# Patient Record
Sex: FEMALE | Race: BLACK OR AFRICAN AMERICAN | NOT HISPANIC OR LATINO | Employment: FULL TIME | URBAN - METROPOLITAN AREA
[De-identification: names, ages, dates, MRNs, and addresses within clinical notes are randomized per-mention and may not be internally consistent; named-entity substitution may affect disease eponyms.]

---

## 2017-01-04 ENCOUNTER — PRENATAL OFFICE VISIT (OUTPATIENT)
Dept: OBGYN | Facility: CLINIC | Age: 27
End: 2017-01-04
Payer: COMMERCIAL

## 2017-01-04 ENCOUNTER — TRANSFERRED RECORDS (OUTPATIENT)
Dept: HEALTH INFORMATION MANAGEMENT | Facility: CLINIC | Age: 27
End: 2017-01-04

## 2017-01-04 VITALS
HEART RATE: 81 BPM | SYSTOLIC BLOOD PRESSURE: 114 MMHG | DIASTOLIC BLOOD PRESSURE: 76 MMHG | BODY MASS INDEX: 28.42 KG/M2 | WEIGHT: 172 LBS | TEMPERATURE: 97.1 F | RESPIRATION RATE: 16 BRPM

## 2017-01-04 DIAGNOSIS — O34.219 PREVIOUS CESAREAN DELIVERY, ANTEPARTUM CONDITION OR COMPLICATION: ICD-10-CM

## 2017-01-04 DIAGNOSIS — O36.0990: ICD-10-CM

## 2017-01-04 DIAGNOSIS — Z34.80 SUPERVISION OF OTHER NORMAL PREGNANCY, ANTEPARTUM: ICD-10-CM

## 2017-01-04 DIAGNOSIS — O21.9 NAUSEA/VOMITING IN PREGNANCY: ICD-10-CM

## 2017-01-04 DIAGNOSIS — O99.019 ANEMIA AFFECTING PREGNANCY: Primary | ICD-10-CM

## 2017-01-04 PROCEDURE — 99207 ZZC PRENATAL VISIT: CPT | Performed by: OBSTETRICS & GYNECOLOGY

## 2017-01-04 NOTE — Clinical Note
Please assist pt in scheduling IV iron treatment if able, notify MFM to send reports to me (not Dr. Joshi), notify  Nurse Navigator (Penny) at [facility] of pt and plan and contact info. Schedule repeat C/S- see other encounter.

## 2017-01-04 NOTE — PATIENT INSTRUCTIONS
If you have any questions regarding your visit, Please contact your care team.     [employer] Access Services: [phone]    [facility] CLINIC HOURS TELEPHONE NUMBER   CHANTELL Jay-    Modesta Mohr-JOSSIE Carl-Medical Assistant   Monday-[place]  8:00a.m-4:45 p.m  Wednesday-[place] 8:00a.m-4:45 p.m.  Thursday-[place]  8:00a.m-4:45 p.m.  Friday-[place]  8:00a.m-4:45 p.m. [facility]  [address]  [place], [place] [address] [phone] ask [facility]  [phone] Fax  Imaging [phone]    [facility] Labor [facility] Delivery  [address] [place] [address] Dr.  [place], [place] [address] [phone]    [facility]  [address] [place] [address]  [phone] ask [facility]  [phone] Fax  Imaging [phone]     Urgent Care locations:    [place]        [place] Monday-Friday  5 pm - 9 pm  Saturday and Sunday   9 am - 5 pm    Monday-Friday   11 am - 9 pm  Saturday and Sunday   9 am - 5 pm   [phone]       If you need a medication refill, please contact your pharmacy. Please allow 3 business days for your refill to be completed.  As always, Thank you for trusting us with your healthcare needs!

## 2017-01-04 NOTE — NURSING NOTE
"Chief Complaint   Patient presents with     Prenatal Care     OBV 32w3d       Initial /76 mmHg  Pulse 81  Temp(Src) 97.1  F (36.2  C) (Oral)  Resp 16  Wt 172 lb (78.019 kg)  LMP 05/22/2016 (Exact Date)  Breastfeeding? No Estimated body mass index is 28.42 kg/(m^2) as calculated from the following:    Height as of 6/20/16: 5' 5.25\" (1.657 m).    Weight as of this encounter: 172 lb (78.019 kg).  BP completed using cuff size: regular Left arm  Faizai RICKY Freire      "

## 2017-01-04 NOTE — MR AVS SNAPSHOT
After Visit Summary   2017    Brittani Gaviria    MRN: [id number]           Patient Information     Date Of Birth          1990        Visit Information        Provider Department      2017 2:00 PM Joni Siddiqui MD [facility]        Today's Diagnoses     Anemia affecting pregnancy    -  1     Rh sensitization, unspecified trimester, not applicable or unspecified fetus         Supervision of other normal pregnancy, antepartum         Nausea/vomiting in pregnancy         Previous  delivery, antepartum condition or complication           Care Instructions                                                        If you have any questions regarding your visit, Please contact your care team.     [employer] Access Services: [phone]    [facility] CLINIC HOURS TELEPHONE NUMBER   Joni Siddiqui M.D.      Deyanira-    Modesta Mohr-JOSSIE Carl-Medical Assistant   Monday-[place]  8:00a.m-4:45 p.m  Wednesday-[place] 8:00a.m-4:45 p.m.  Thursday-[place]  8:00a.m-4:45 p.m.  Friday-[place]  8:00a.m-4:45 p.m. [facility]  [address] [place]  [place] [address]  [phone] ask [facility]  [phone] Fax  Imaging [phone]    [facility] Delivery  [address] [place] [address] [place] [address]  [phone]    [facility]  [address] [facility] [place] [address]  [phone] ask [facility]  [phone] Fax  Imaging [phone]     Urgent Care locations:    [facility] Monday-Friday  5 pm - 9 pm  Saturday and    9 am - 5 pm    Monday-Friday   11 am - 9 pm  Saturday and    9 am - 5 pm   [phone]       If you need a medication refill, please contact your pharmacy. Please allow 3 business days for your refill to be completed.  As always, Thank you for trusting us with your healthcare  needs!          Follow-ups after your visit        Who to contact     If you have questions or need follow up information about today's clinic visit or your schedule please contact [facility] directly at [phone].  Normal or non-critical lab and imaging results will be communicated to you by MyChart, letter or phone within 4 business days after the clinic has received the results. If you do not hear from us within 7 days, please contact the clinic through [employer] or phone. If you have a critical or abnormal lab result, we will notify you by phone as soon as possible.  Submit refill requests through ralali or call your pharmacy and they will forward the refill request to us. Please allow 3 business days for your refill to be completed.          Additional Information About Your Visit        MyChart Information     ralali gives you secure access to your electronic health record. If you see a primary care provider, you can also send messages to your care team and make appointments. If you have questions, please call your primary care clinic.  If you do not have a primary care provider, please call [phone] and they will assist you.        Care EveryWhere ID     This is your Care EveryWhere ID. This could be used by other organizations to access your [facility] medical records  [id number]        Your Vitals Were     Pulse Temperature Respirations Last Period Breastfeeding?       81 97.1  F (36.2  C) (Oral) 16 05/22/2016 (Exact Date) No        Blood Pressure from Last 3 Encounters:   01/04/17 114/76   11/30/16 114/75   09/14/16 114/73    Weight from Last 3 Encounters:   01/04/17 172 lb (78.019 kg)   11/30/16 180 lb (81.647 kg)   09/14/16 172 lb (78.019 kg)              Today, you had the following     No orders found for display         Today's Medication Changes          These changes are accurate as of: 1/4/17  2:04 PM.  If you have any questions, ask your nurse or doctor.                These medicines have changed or have updated prescriptions.        Dose/Directions    folic acid 400 MCG tablet   Commonly known as:  FOLVITE   This may have changed:  how much to take   Used for:  Amenorrhea        Dose:  800 mcg   Take 2 tablets (800 mcg) by mouth daily   Quantity:  180 tablet   Refills:  3                Primary Care Provider Office Phone # Fax #    Lida Tamayo PA-C [phone]       [facility] [address]  [facility] [address]        Thank you!     Thank you for choosing [facility]  for your care. Our goal is always to provide you with excellent care. Hearing back from our patients is one way we can continue to improve our services. Please take a few minutes to complete the written survey that you may receive in the mail after your visit with us. Thank you!             Your Updated Medication List - Protect others around you: Learn how to safely use, store and throw away your medicines at www.disposemymeds.org.          This list is accurate as of: 1/4/17  2:04 PM.  Always use your most recent med list.                   Brand Name Dispense Instructions for use    folic acid 400 MCG tablet    FOLVITE    180 tablet    Take 2 tablets (800 mcg) by mouth daily       ondansetron 4 MG ODT tab    ZOFRAN ODT    20 tablet    Take 1-2 tablets (4-8 mg) by mouth every 8 hours as needed for nausea       PRENATAL 1 PO      Take by mouth daily       vitamin D 2000 UNITS tablet     100 tablet    Take 2,000 Units by mouth daily

## 2017-01-05 ENCOUNTER — DOCUMENTATION ONLY (OUTPATIENT)
Dept: OBGYN | Facility: CLINIC | Age: 27
End: 2017-01-05

## 2017-01-05 DIAGNOSIS — O34.219 PREVIOUS CESAREAN DELIVERY, ANTEPARTUM CONDITION OR COMPLICATION: Primary | ICD-10-CM

## 2017-01-05 DIAGNOSIS — O36.0990: ICD-10-CM

## 2017-01-06 ENCOUNTER — TELEPHONE (OUTPATIENT)
Dept: OBGYN | Facility: CLINIC | Age: 27
End: 2017-01-06

## 2017-01-06 DIAGNOSIS — O34.219 PREVIOUS CESAREAN DELIVERY, ANTEPARTUM CONDITION OR COMPLICATION: ICD-10-CM

## 2017-01-06 DIAGNOSIS — O36.0990: ICD-10-CM

## 2017-01-06 DIAGNOSIS — O99.019 ANEMIA AFFECTING PREGNANCY: Primary | ICD-10-CM

## 2017-01-06 DIAGNOSIS — O21.9 NAUSEA/VOMITING IN PREGNANCY: ICD-10-CM

## 2017-01-06 DIAGNOSIS — Z34.80 SUPERVISION OF OTHER NORMAL PREGNANCY, ANTEPARTUM: ICD-10-CM

## 2017-01-06 NOTE — PROGRESS NOTES
Brittani Gaviria  1990  Gender: Female  SSN: Not available  Phone: [phone] (home)     Pre-operative diagnosis: Previous  deliveries, Rh sensitization, corrected Tetralogy of Fallot  Surgical procedure: Repeat  delivery  Special equipment: None    Anesthesia: Spinal  Position: Supine  Latex Allergy: No  VRE or MRSA or Other Precautions: None  Initiate Pre-op orders for above procedure: Yes as ordered in Epic.   Additional orders noted there also.   [facility] or [facility] Consent signed by surgeon: Not yet.  H&P: To be scheduled and will be faxed before surgery. Surgeon will do H&P.  [facility] or [facility] Consent signed by patient: Not yet.  Sterilization or Hysterectomy consent signed in advance: Not applicable.   Date sterilization consent signed for Med Assistance pt: Not applicable.  Surgeon: Joni Siddiqui MD   PA assistant: No  Physician assistant: No  Hospital assistant requested: Yes    Electronically signed by: Joni Siddiqui MD       2017          10:10 PM     Location for Surgery: [facility]  Date:  or .   Estimated Case Length: 1 hours.  Scheduled as: Admit following surgery  Other Special Preparations: None.  Pre-anesthetic medical evaluation is to be arranged with me at future OB visit.  Anticipate a post-op visit at 6 weeks will be needed. Pt may arrange this now or later.

## 2017-01-06 NOTE — PROGRESS NOTES
Surgery Scheduled.    Date of Surgery 17 Time of Surgery 2:30 pm  Procedure: Repeat   Hospital/Surgical Facility: [facility]  Surgeon: [facility]  Type of Anesthesia Anticipated: Spinal  Pre-op: To be done by Dr. Siddiqui at [facility]  6 week post op 3/28/17 with Dr. Siddiqui at  OB  Pre-certification 17  Consent signed: NA  Hospital Stay 3 days       [facility] surgery packet mailed to patient's home address.  Patient instructed NPO 12 hours prior to surgery, arrive 1 1/2 hours prior to surgery, must not have a .  Patient understood and agrees to the plan.      Surgery Pre-Certification     Medical Record Number: [id number]   Brittani Gaviria   YOB: 1990   Phone: [phone] (home)   Primary Provider: Lida Tamayo     Reason for Admit:   Previous / RH Sensitization/ Corrected Tetrology of Fallot     Surgeon: Joni Siddiqui MD   Surgical Procedure: Repeat    ICD-9 Coded: O34.21/ O36.0990/ Q21.3   Date of Surgery: 17   Consent signed? N/A     Hospital: [facility]   Inpatient- Length of stay:  3 days.     Requestor:   Jyoti Madera     Location:   [facility]   Deyanira Rangel CMA

## 2017-01-06 NOTE — PROGRESS NOTES
Doing ok. Gap in care. No signif signs, symptoms or concerns except pregnancy discomforts, recurrent nausea and wt loss. Level 2 u/s and fetal echo normal. Following with MFM for serial u/s for MCA dopplers, BPP and fetal growth- all stable at present but it appears that MFM reports have been sent to ?Dr. Joshi and not me. Repeat C/S at 38 weeks at [facility] advised per MFM unless new developments. Will arrange. Cardiology had previously noted normal LV function with EF of 55 pct 5 mo ago. Pt missed follow-up appt this last month. Has not seen Endocrinology for hyperparathyroidism but hypercalcemia is chronic and stable and asymptomatic for years. Here with children. Refer to [facility] PNN too. Advice as per Anticipatory Guidance/Checklist update. Discussed condition, tests and care plan including RBA. Problem list updated. Tdap planned and Hgb next.  Joni Siddiqui MD

## 2017-01-06 NOTE — TELEPHONE ENCOUNTER
Return call from JOSSIE Francis. Gave update per Dr. Siddiqui as below. Gave erin Francis's future appts including infusion appts, cardiology and next OB appt. Faxed prenatal episode and US results to Penny at [phone] and confirmed at [phone]. Fani Leiva RN, BAN

## 2017-01-09 ENCOUNTER — CARE COORDINATION (OUTPATIENT)
Dept: CARDIOLOGY | Facility: CLINIC | Age: 27
End: 2017-01-09

## 2017-01-09 DIAGNOSIS — Q21.3 TOF (TETRALOGY OF FALLOT): Primary | ICD-10-CM

## 2017-01-09 DIAGNOSIS — Z34.90 PREGNANCY: ICD-10-CM

## 2017-01-09 PROBLEM — Q24.9 CONGENITAL ANOMALY OF HEART: Status: ACTIVE | Noted: 2017-01-09

## 2017-01-17 ENCOUNTER — MYC MEDICAL ADVICE (OUTPATIENT)
Dept: OBGYN | Facility: CLINIC | Age: 27
End: 2017-01-17

## 2017-01-19 ENCOUNTER — TELEPHONE (OUTPATIENT)
Dept: NURSING | Facility: CLINIC | Age: 27
End: 2017-01-19

## 2017-01-19 NOTE — TELEPHONE ENCOUNTER
Left message asking patient to call back regarding appt. She needs to see Dr Alcaraz as she is a congenital patient and >30 weeks pregnant. She should not see Dr Lo next week. Asked her to call back.

## 2017-01-20 ENCOUNTER — INFUSION THERAPY VISIT (OUTPATIENT)
Dept: INFUSION THERAPY | Facility: CLINIC | Age: 27
End: 2017-01-20
Payer: COMMERCIAL

## 2017-01-20 VITALS
OXYGEN SATURATION: 100 % | TEMPERATURE: 96.8 F | HEART RATE: 86 BPM | SYSTOLIC BLOOD PRESSURE: 119 MMHG | RESPIRATION RATE: 16 BRPM | DIASTOLIC BLOOD PRESSURE: 67 MMHG

## 2017-01-20 DIAGNOSIS — O99.019 ANEMIA AFFECTING PREGNANCY: Primary | ICD-10-CM

## 2017-01-20 PROCEDURE — 99207 ZZC NO CHARGE LOS: CPT

## 2017-01-20 PROCEDURE — 96365 THER/PROPH/DIAG IV INF INIT: CPT | Performed by: INTERNAL MEDICINE

## 2017-01-20 RX ADMIN — Medication 250 ML: at 14:15

## 2017-01-20 ASSESSMENT — PAIN SCALES - GENERAL: PAINLEVEL: NO PAIN (0)

## 2017-01-20 NOTE — PROGRESS NOTES
Infusion Nursing Note:  Brittani Gaviria presents today for Venofer.    Patient seen by provider today: No   present during visit today: Not Applicable.    Note: The pharmacist provided patient with a handout on Venofer and verbally reviewed side effects and reaction symptoms.    Intravenous Access: PIV placed    Treatment Conditions:  Not Applicable.      Post Infusion Assessment:  Patient tolerated infusion without incident.  Blood return noted pre and post infusion.  Site patent and intact, free from redness, edema or discomfort.  Access discontinued per protocol.    Discharge Plan:   Patient will return 1/24/17 for next appointment.   Patient discharged in stable condition accompanied by: self.  Departure Mode: Ambulatory.    Ioana Kaiser RN

## 2017-01-20 NOTE — TELEPHONE ENCOUNTER
Spoke with patient. She is aware of appt with Dr Alcaraz with echocardiogram prior on Feb 10. States she spoke to Moises and is aware of the address of the clinic down at the [place]. Informed her that I am cancelling appt with Dr Lo on Jan 24 th.

## 2017-01-23 ENCOUNTER — TELEPHONE (OUTPATIENT)
Dept: OBGYN | Facility: CLINIC | Age: 27
End: 2017-01-23

## 2017-01-23 NOTE — TELEPHONE ENCOUNTER
----- Message from Joni Siddiqui MD sent at 1/23/2017 12:13 PM CST -----  Anesthesia at [facility] has advised that pt not deliver at [facility]. Pt is aware of this following her consultation at [facility]. She is transferring her OB care to [place] and will deliver there. Please contact pt to let her know that we will cancel the [facility] C/S with me and the related OB prenatal and postpartum visits. She should still keep her other follow-up visits with M and the remaining IV iron infusion visit here. I can also see her for the 1/25 (and 2/3, 2/8, or 2/15) OB visit if she desires and does not have an OB visit with Dr. Travis Kaba at [place] that week. She should also arrange for transfer of OB records as appropriate. Thank you.

## 2017-01-23 NOTE — TELEPHONE ENCOUNTER
Spoke with patient- gave patient all information below.  Patient understood.  Cancelled  on 17 with Ioana ([facility] Surgical Scheduler).  Pre-certification department notified.  Cancelled patient's OBV and PPE with Dr. Siddiqui.  Patient has upcoming appointment scheduled at [facility] and will continue to see them for the remaining visits.  Patient will keep MFM appointment's and IV iron appointment's at .  Patient is coming into clinic tomorrow for IV iron and will stop at check in area 1 to sign KATHERYN form to have records sent to [facility]. Once form is received- will send records.  Patient understood and pleased.      FYI     Cancelled patient's  on 17 at [facility] with Dr. Siddiqui. Patient needs to transfer care- so not rescheduling.     Thanks.

## 2017-01-24 ENCOUNTER — INFUSION THERAPY VISIT (OUTPATIENT)
Dept: INFUSION THERAPY | Facility: CLINIC | Age: 27
End: 2017-01-24
Payer: COMMERCIAL

## 2017-01-24 VITALS
DIASTOLIC BLOOD PRESSURE: 64 MMHG | RESPIRATION RATE: 20 BRPM | SYSTOLIC BLOOD PRESSURE: 115 MMHG | TEMPERATURE: 99 F | HEART RATE: 87 BPM | OXYGEN SATURATION: 99 %

## 2017-01-24 DIAGNOSIS — O99.019 ANEMIA AFFECTING PREGNANCY: Primary | ICD-10-CM

## 2017-01-24 PROCEDURE — 99207 ZZC NO CHARGE LOS: CPT

## 2017-01-24 PROCEDURE — 96366 THER/PROPH/DIAG IV INF ADDON: CPT | Performed by: INTERNAL MEDICINE

## 2017-01-24 PROCEDURE — 96365 THER/PROPH/DIAG IV INF INIT: CPT | Performed by: INTERNAL MEDICINE

## 2017-01-24 RX ADMIN — Medication 250 ML: at 10:57

## 2017-01-24 ASSESSMENT — PAIN SCALES - GENERAL: PAINLEVEL: NO PAIN (0)

## 2017-01-24 NOTE — PROGRESS NOTES
Infusion Nursing Note:  Brittani Everette presents today for Venofer #2/3.    Patient seen by provider today: No   present during visit today: Not Applicable.    Note: N/A.    Intravenous Access:  Peripheral IV placed.    Treatment Conditions:  Not Applicable.      Post Infusion Assessment:  Patient tolerated infusion without incident.  No evidence of extravasations.  Access discontinued per protocol.    Discharge Plan:   AVS to patient via MYCHART.  Patient will return 1/31/17 for next appointment.   Patient discharged in stable condition accompanied by: self.  Departure Mode: Ambulatory.    Sheila Figueroa RN

## 2017-02-09 ENCOUNTER — PRE VISIT (OUTPATIENT)
Dept: CARDIOLOGY | Facility: CLINIC | Age: 27
End: 2017-02-09

## 2017-02-09 DIAGNOSIS — Q21.3 TETRALOGY OF FALLOT: Primary | ICD-10-CM

## 2017-02-10 ENCOUNTER — OFFICE VISIT (OUTPATIENT)
Dept: CARDIOLOGY | Facility: CLINIC | Age: 27
End: 2017-02-10
Attending: INTERNAL MEDICINE
Payer: COMMERCIAL

## 2017-02-10 VITALS — HEART RATE: 75 BPM | DIASTOLIC BLOOD PRESSURE: 69 MMHG | OXYGEN SATURATION: 100 % | SYSTOLIC BLOOD PRESSURE: 119 MMHG

## 2017-02-10 DIAGNOSIS — Q21.3 TOF (TETRALOGY OF FALLOT): ICD-10-CM

## 2017-02-10 DIAGNOSIS — R00.2 PALPITATIONS: Primary | ICD-10-CM

## 2017-02-10 PROCEDURE — 93010 ELECTROCARDIOGRAM REPORT: CPT | Mod: ZP | Performed by: INTERNAL MEDICINE

## 2017-02-10 PROCEDURE — 93005 ELECTROCARDIOGRAM TRACING: CPT | Mod: ZF

## 2017-02-10 PROCEDURE — 99212 OFFICE O/P EST SF 10 MIN: CPT | Mod: 25,ZF

## 2017-02-10 PROCEDURE — 99215 OFFICE O/P EST HI 40 MIN: CPT | Mod: ZP | Performed by: INTERNAL MEDICINE

## 2017-02-10 ASSESSMENT — PAIN SCALES - GENERAL: PAINLEVEL: NO PAIN (0)

## 2017-02-10 NOTE — LETTER
"2/10/2017      RE: Brittani Gaviria  [address]   [facility] [address]       Dear Colleague,    Thank you for the opportunity to participate in the care of your patient, Brittani Gaviria, at the [facility]. Please see a copy of my visit note below.    HPI: Brittani Gaviria is a 26 year old female with history of surgically repaired tetralogy of fallot who is currently 37 weeks pregnant presents for initial evaluation in the adult congenital clinic.  Pt reports that she was born in [place] at [facility] and was delivered via .  She was diagnosed with TOF shortly after birth but :had to wait to grow\" before intervention.  She reports that she underwent complete surgical repair at 11 months of age.  She then underwent a \"valve repair\" at age age and then \"valve replacement\" at 13 years of age.  The first two procedures were performed at [facility] which is now closed. The last was performed at [facility] in [place], [place].  She has never been anticoagulated.  She does not recall any significant cardiac limitations growing up.  She may have not a little less energy/exercise tolerance but this was mild.  She denies having any special limitations or restriction.  The patient followed up at [place] until she was 17 then at [facility] in [place] (age 17 - 21) and [place], [place].  Her first pregnancy at age 20 was uncomplicated.  The second pregnancy at age 21 was also uncomplicated.  The third pregnancy at age 24 (last year) resulted in miscarriage at 13 weeks.  Pt denies any cardiac issues with pregnancy, delivery or post-partum period.  Today patient reports that she is feeling well.  She denies any chest pain or pressure, sob/torrez, orthopnea, pnd, palpitations, syncope/presyncope or stormy.  She reports that the pregnancy is progressing well and is planning to deliver at [facility] via planned "  due to prior .      PAST MEDICAL HISTORY:  Past Medical History:   Diagnosis Date     Carpal tunnel syndrome     right, conservative management      Chlamydia 2011     Cholelithiasis     resolved     Hyperparathyroidism (H) 2016     Iron deficiency anemia 2015     Kidney stones      Migraines      Other and unspecified ovarian cyst      Rh sensitization 2016     Tetralogy of Fallot     surgical correction       CURRENT MEDICATIONS:  Current Outpatient Prescriptions   Medication Sig Dispense Refill     Prenatal MV-Min-Fe Fum-FA-DHA (PRENATAL 1 PO) Take by mouth daily       ondansetron (ZOFRAN ODT) 4 MG disintegrating tablet Take 1-2 tablets (4-8 mg) by mouth every 8 hours as needed for nausea 20 tablet 1     folic acid (FOLVITE) 400 MCG tablet Take 2 tablets (800 mcg) by mouth daily (Patient taking differently: Take 1,200 mcg by mouth daily ) 180 tablet 3     Cholecalciferol (VITAMIN D) 2000 UNITS tablet Take 2,000 Units by mouth daily 100 tablet 3       PAST SURGICAL HISTORY:  Past Surgical History:   Procedure Laterality Date     CARDIAC SURGERY      x 3 @ ages , 11 months, 13 years      SECTION      x 2     CHOLECYSTECTOMY, LAPOROSCOPIC      Cholecystectomy, Laparoscopic       ALLERGIES  Blood transfusion related (informational only)    FAMILY HX:  Family History   Problem Relation Age of Onset     DIABETES Maternal Grandmother      Hypertension Father      Hypertension Sister      Multiple Sclerosis Mother      CEREBROVASCULAR DISEASE Mother      Coronary Artery Disease No family hx of      Depression No family hx of      Anxiety Disorder No family hx of      Anesthesia Reaction No family hx of      CANCER No family hx of      Thyroid Disease No family hx of      Glaucoma No family hx of      Macular Degeneration No family hx of        SOCIAL HX:  Social History     Social History     Marital Status:      Spouse Name: Pascual     Number of Children: 2      Years of Education: N/A     Occupational History     pharmacy tech      [employer]     Social History Main Topics     Smoking status: Never Smoker      Smokeless tobacco: Never Used     Alcohol Use: No     Drug Use: No     Sexual Activity:     Partners: Male     Birth Control/ Protection: None     Other Topics Concern     None     Social History Narrative    From [place] and [place].        ROS:  Constitutional: No fever, chills, or sweats. No weight gain/loss.   HEENT: No visual disturbance, ear ache, epistaxis, sore throat.   Allergies/Immunologic: Negative.   Respiratory: No cough, hemoptysis.   Cardiovascular: As per HPI.   GI: No nausea, vomiting, hematemesis, melena, or hematochezia.   : No urinary frequency, dysuria, or hematuria.   Integument: No rash.   Psychiatric: No anxiety / depression.   Neuro: No speech disturbance, focal sensory or motor deficit.   Endocrinology: No polyuria / polyphagia.   Musculoskeletal: No myalgia.    VITAL SIGNS:  /69  Pulse 75  LMP 05/22/2016 (Exact Date)  SpO2 100%  There is no height or weight on file to calculate BMI.  Wt Readings from Last 2 Encounters:   01/04/17 78 kg (172 lb)   11/30/16 81.6 kg (180 lb)       PHYSICAL EXAM  General: appears comfortable, alert and articulate  Head: normocephalic, atraumatic  Eyes: anicteric sclera, EOMI  Neck: no adenopathy, 2+ carotids without bruits  Orophyarynx: moist mucosa, no lesions, dentition intact  Heart: Normal S1 and widened splitting of S2.  Early short diastolic murmur and soft systolic flow murmur at LUSB. No rub, or gallop.    Lungs: clear, no rales or wheezing  Abdomen: soft, non-tender, bowel sounds present. Gravid.  Extremities: no clubbing, cyanosis or edema.  Neurological: normal speech and affect, no gross motor deficits       LABS  Recent Labs   Lab Test  07/20/16   1657  07/22/15   0845  07/09/15   1224   WBC  13.7*   --   7.8   HGB  11.2*  10.8*  10.4*   HCT  35.3   --   34.7*   PLT  301   --   294     Recent  Labs   Lab Test  16   1657  07/09/15   1224   NA  138  143   POTASSIUM  3.7  4.5   CHLORIDE  107  111*   CO2  24  28   GLC  84  78   BUN  7  7   CR  0.74  0.78   LISBET  12.6*  12.0*     EK/15/16  NSR.  RBBB.  0.13/0.14/0.419  RBBB    ECHO (16):   H/O TOF repair.  Global and regional left ventricular function is normal with an EF of 55-60%.  Mild right ventricular dilation is present.  Global right ventricular function is normal.  Moderate pulmonic insufficiency is present.  Mean pulmonary gradient 14 mmHg.  Ascending aorta 3.7 cm.  Sinuses of Valsalva 3.7 cm.    (10/10/16):   Ziopatch showed rare PAC and rare PVC.  No significant / complex arrhythmias.    Echo today  Patient after transannular patch repair for tetralogy of Fallot. There is no  residual ventricular level shunt. There is mild to moderate right ventricular  enlargement. Normal right ventricular systolic function. Normal left ventricular systolic function with a calculated biplane left ventricular ejection fraction of 60-65%. Trivial tricuspid valve insufficiency. Estimated right ventricular systolic pressure is 25-30 mmHg plus right atrial pressure. There is mild flow acceleration across the pulmonary valve with a peak gradient across the pulmonary valve of 15-20 mmHg. Moderate to severe (3-4+) pulmonary valve insufficiency. There is mild dilation of the aortic root at  the level of the sinuses of Valsalva.      ASSESSMENT AND PLAN:  26 year old female with history of surgically repaired tetralogy of fallot who is currently 37 weeks pregnant presents for initial evaluation in the adult congenital clinic  1. Surgically corrected Tetralogy of Fallot (TOF).   Pt remains euvolemic today by history and exam.  Echo confirms normal LV systolic function.  She does have moderate to severe pulmonary regurgitation but pulmonary regurgitation is typically well tolerated during pregnancy.  She has no evidence of RV failure.  Recent Holter confirms no  significant arrhythmias.  Would recommend judicious use of IV fluids during the peripartum period, otherwise no specific cardiac recommendations for her scheduled  except for SBE prophylaxis during delivery.  No cardiac telemetry monitoring needed during labor or the peripartum period.  Will need further cardiac evaluation specifically to assess degree of pulmonary regurgitation in the now pregnant state and assess RV size and function.  After delivery, Encouraged patient to begin regular aerobic exercise 20-30 minutes a day, 4-5 times per week and follow low-salt, heart healthy diet.   Reminded patient of need to maintain good oral hygiene and continue regular dental care with SBE prophylaxis.  Will attempt to obtain surgical records if able.      FOLLOW UP:  In 8-9 months with cardiac MRI/MRA. Will be happy to see sooner if change in clinical status or new questions/concerns arise.      Jocelyn Cheney MD  Section Head - Advanced Heart Failure, Transplantation and Mechanical Circulatory Support  Co-Director - Adult Congenital and Cardiovascular Genetics Center  Associate Professor of Medicine, [facility]

## 2017-02-10 NOTE — MR AVS SNAPSHOT
After Visit Summary   2/10/2017    Brittani Gaviria    MRN: [id number]           Patient Information     Date Of Birth          1990        Visit Information        Provider Department      2/10/2017 1:30 PM Jocelyn Cheney MD [facility]        Today's Diagnoses     Palpitations    -  1     TOF (tetralogy of Fallot)           Care Instructions    You were seen today in the Adult Congenital and Cardiovascular Genetics Clinic at the [facility].    Cardiology Providers you saw during your visit:  Dr. Jocelyn Cheney    Diagnosis:  TOF currently 37 weeks pregnant    Results:  The results of your echo were discussed with you today.    Recommendations:    1.  Continue to eat a heart healthy, low salt diet.  2.  Continue to get 20-30 minutes of aerobic activity, 4-5 days per week.  Examples of aerobic activity include walking, running, swimming, cycling, etc.  3.  Continue to observe good oral hygiene, with regular dental visits.  4.  Recommend judicious use of IV fluids during the peripartum period, otherwise no specific cardiac recommendations for her scheduled .  5. Please use SBE prophylaxis during delivery.  6. No cardiac tele monitoring needed.  7.  Please have a cardiac MRI/MRA in 8-9 months.     The MRI will be performed at the [facility] -- [address] [facility] [address]    Please check in to the [facility] Waiting Room (which is located on the main floor of the hospital) 15 minutes prior to your scheduled time.    To prepare for your MRI:   A.  Nothing to eat or drink for 3 hours prior.   B.  No caffeine, alcohol, or tobacco for 12 hours prior.   C.  You may take your usual medications, with a sip of water.    Filed Vitals:    02/10/17 1315   BP: 119/69   Pulse: 75   SpO2: 100%       SBE prophylaxis:   Yes__X__  No____    Lifelong Bacterial Endocarditis Prophylaxis:  YES_X___  NO____    If YES is checked, follow the recommendations  outlined below:  1. Take antibiotic(s) prior to interventional procedures or surgeries (dental, respiratory, urologic, gastrointestinal, gynecologic), or instrumental examinations.   2. Observe good oral hygiene daily, as advised by your dentist. Get regular professional dental care.  3. Keep cuts clean.  4. Infections should be treated promptly.      Exercise restrictions:   Yes__X__  No____         If yes, list restrictions:  Must be allowed to rest if fatigued or SOB      Work restrictions:  Yes____  No____         If yes, list restrictions:      Follow-up:  Follow up with Dr. Cheney in 8-9 months with a cardiac MRI/MRA prior and fasting labs.      For after hours urgent needs, call [phone] and ask to speak to the Adult Congenital Physician on call.  Mention Job Code 0401.    For emergencies call [phone].    For any scheduling needs and to contact your nurse in the Adult Congenital and Cardiovascular Genetics Clinic, please call Moises Alarcon, Procedure , at [phone].    Thank you for your visit today!  If you have questions or concerns about today's visit, please call me.    Ruddy Fuentes, RN, BSN  Cardiology Care Coordinator  [facility]  [email or web address]  Ph.[phone]    [facility]  [facility]   Clinics and Surgery Center  Mail Code [id number]  [address] [place] [address]  [address]         Follow-ups after your visit        Follow-up notes from your care team     Return in about 8 months (around 10/10/2017) for ACHD Follow up with Dr. Cheney.      Future tests that were ordered for you today     Open Future Orders        Priority Expected Expires Ordered    MRI Angiogram chest w & w/o contrast Routine 2/11/2017 2/10/2018 2/10/2017    MRI Cardiac w/contrast and flow Routine 2/11/2017 2/10/2018 2/10/2017    Comprehensive metabolic panel Routine  12/10/2018 2/10/2017    CBC with platelets  differential Routine  12/10/2018 2/10/2017    Lipid panel reflex to direct LDL Routine  12/10/2018 2/10/2017    EKG 12-lead, tracing only (Future) Routine  6/9/2017 2/9/2017            Who to contact     If you have questions or need follow up information about today's clinic visit or your schedule please contact [facility] directly at [phone].  Normal or non-critical lab and imaging results will be communicated to you by BumpTophart, letter or phone within 4 business days after the clinic has received the results. If you do not hear from us within 7 days, please contact the clinic through [facility] or phone. If you have a critical or abnormal lab result, we will notify you by phone as soon as possible.  Submit refill requests through DEMANDIT or call your pharmacy and they will forward the refill request to us. Please allow 3 business days for your refill to be completed.          Additional Information About Your Visit        BumpTopharIndicee Information     DEMANDIT gives you secure access to your electronic health record. If you see a primary care provider, you can also send messages to your care team and make appointments. If you have questions, please call your primary care clinic.  If you do not have a primary care provider, please call [phone] and they will assist you.        Care EveryWhere ID     This is your Care EveryWhere ID. This could be used by other organizations to access your [facility] medical records  [id number]        Your Vitals Were     Pulse Pulse Oximetry Last Period             75 100% 05/22/2016 (Exact Date)          Blood Pressure from Last 3 Encounters:   02/10/17 119/69   01/24/17 115/64   01/20/17 119/67    Weight from Last 3 Encounters:   01/04/17 78.019 kg (172 lb)   11/30/16 81.647 kg (180 lb)   09/14/16 78.019 kg (172 lb)              We Performed the Following     EKG 12-lead, tracing only (Same Day)          Today's Medication Changes          These changes are accurate as of:  2/10/17  2:02 PM.  If you have any questions, ask your nurse or doctor.               These medicines have changed or have updated prescriptions.        Dose/Directions    folic acid 400 MCG tablet   Commonly known as:  FOLVITE   This may have changed:  how much to take   Used for:  Amenorrhea        Dose:  800 mcg   Take 2 tablets (800 mcg) by mouth daily   Quantity:  180 tablet   Refills:  3                Primary Care Provider Office Phone # Fax #    Lida Tamayo PA-C [phone]       [facility] [address] [facility] [address]        Thank you!     Thank you for choosing [facility]  for your care. Our goal is always to provide you with excellent care. Hearing back from our patients is one way we can continue to improve our services. Please take a few minutes to complete the written survey that you may receive in the mail after your visit with us. Thank you!             Your Updated Medication List - Protect others around you: Learn how to safely use, store and throw away your medicines at www.disposemymeds.org.          This list is accurate as of: 2/10/17  2:02 PM.  Always use your most recent med list.                   Brand Name Dispense Instructions for use    folic acid 400 MCG tablet    FOLVITE    180 tablet    Take 2 tablets (800 mcg) by mouth daily       ondansetron 4 MG ODT tab    ZOFRAN ODT    20 tablet    Take 1-2 tablets (4-8 mg) by mouth every 8 hours as needed for nausea       PRENATAL 1 PO      Take by mouth daily       vitamin D 2000 UNITS tablet     100 tablet    Take 2,000 Units by mouth daily

## 2017-02-10 NOTE — PATIENT INSTRUCTIONS
You were seen today in the Adult Congenital and Cardiovascular Genetics Clinic at the [facility].    Cardiology Providers you saw during your visit:  Dr. Jocelyn Cheney    Diagnosis:  TOF currently 37 weeks pregnant    Results:  The results of your echo were discussed with you today.    Recommendations:    1.  Continue to eat a heart healthy, low salt diet.  2.  Continue to get 20-30 minutes of aerobic activity, 4-5 days per week.  Examples of aerobic activity include walking, running, swimming, cycling, etc.  3.  Continue to observe good oral hygiene, with regular dental visits.  4.  Recommend judicious use of IV fluids during the peripartum period, otherwise no specific cardiac recommendations for her scheduled .  5. Please use SBE prophylaxis during delivery.  6. No cardiac tele monitoring needed.  7.  Please have a cardiac MRI/MRA in 8-9 months.     The MRI will be performed at the [facility] -- [address] [place] [address]    Please check in to the [facility] Waiting Room (which is located on the main floor of the hospital) 15 minutes prior to your scheduled time.    To prepare for your MRI:   A.  Nothing to eat or drink for 3 hours prior.   B.  No caffeine, alcohol, or tobacco for 12 hours prior.   C.  You may take your usual medications, with a sip of water.    Filed Vitals:    02/10/17 1315   BP: 119/69   Pulse: 75   SpO2: 100%       SBE prophylaxis:   Yes__X__  No____    Lifelong Bacterial Endocarditis Prophylaxis:  YES_X___  NO____    If YES is checked, follow the recommendations outlined below:  1. Take antibiotic(s) prior to interventional procedures or surgeries (dental, respiratory, urologic, gastrointestinal, gynecologic), or instrumental examinations.   2. Observe good oral hygiene daily, as advised by your dentist. Get regular professional dental care.  3. Keep cuts clean.  4. Infections should be treated promptly.      Exercise restrictions:    Yes__X__  No____         If yes, list restrictions:  Must be allowed to rest if fatigued or SOB      Work restrictions:  Yes____  No____         If yes, list restrictions:      Follow-up:  Follow up with Dr. Cheney in 8-9 months with a cardiac MRI/MRA prior and fasting labs.      For after hours urgent needs, call [phone] and ask to speak to the Adult Congenital Physician on call.  Mention Job Code 0401.    For emergencies call 141.    For any scheduling needs and to contact your nurse in the Adult Congenital and Cardiovascular Genetics Clinic, please call Moises Alarcon Procedure , at [phone].    Thank you for your visit today!  If you have questions or concerns about today's visit, please call me.    Ruddy Fuentes RN, BSN  Cardiology Care Coordinator  [facility]  [email or web address]  Ph.[phone]    [facility]  [facility]   Clinics and Surgery Center  Mail Code [id number]  [address] [place] [address]  [phone]

## 2017-02-10 NOTE — NURSING NOTE
Chief Complaint   Patient presents with     New Patient     heart problem -- 26 yr old female with PMH significant for TOF and mechanical valve and is currently 37 weeks pregnant presenting for evaluation         Cardiac Testing: Patient given instructions regarding  cMRI/MRA. Discussed purpose, preparation, procedure and when to expect results reported back to the patient. Patient demonstrated understanding of this information and agreed to call with further questions or concerns.  Med Reconcile: Reviewed and verified all current medications with the patient. The updated medication list was printed and given to the patient.  Return Appointment: Patient given instructions regarding scheduling next clinic visit. Patient demonstrated understanding of this information and agreed to call with further questions or concerns.  Patient stated she understood all health information given and agreed to call with further questions or concerns.     Ruddy Fuentes RN, BSN  Cardiology Care Coordinator  [facility]  [email or web address]  [phone]

## 2017-02-14 LAB — INTERPRETATION ECG - MUSE: NORMAL

## 2017-05-08 NOTE — PROGRESS NOTES
"HPI: Brittani Gaviria is a 26 year old female with history of surgically repaired tetralogy of fallot who is currently 37 weeks pregnant presents for initial evaluation in the adult congenital clinic.  Pt reports that she was born in [place] at [facility] and was delivered via .  She was diagnosed with TOF shortly after birth but :had to wait to grow\" before intervention.  She reports that she underwent complete surgical repair at 11 months of age.  She then underwent a \"valve repair\" at age age and then \"valve replacement\" at 13 years of age.  The first two procedures were performed at [facility] which is now closed. The last was performed at [facility] in [place], [place].  She has never been anticoagulated.  She does not recall any significant cardiac limitations growing up.  She may have not a little less energy/exercise tolerance but this was mild.  She denies having any special limitations or restriction.  The patient followed up at [place] until she was 17 then at [facility] in [place] (age 17 - 20) and [place], [place].  Her first pregnancy at age 20 was uncomplicated.  The second pregnancy at age 21 was also uncomplicated.  The third pregnancy at age 24 (last year) resulted in miscarriage at 13 weeks.  Pt denies any cardiac issues with pregnancy, delivery or post-partum period.  Today patient reports that she is feeling well.  She denies any chest pain or pressure, sob/torrez, orthopnea, pnd, palpitations, syncope/presyncope or stormy.  She reports that the pregnancy is progressing well and is planning to deliver at [facility] via planned  due to prior .      PAST MEDICAL HISTORY:  Past Medical History:   Diagnosis Date     Carpal tunnel syndrome     right, conservative management      Chlamydia      Cholelithiasis 2014    resolved     Hyperparathyroidism (H) 2016     Iron deficiency anemia 2015     Kidney stones      Migraines      " Other and unspecified ovarian cyst      Rh sensitization 2016     Tetralogy of Fallot     surgical correction       CURRENT MEDICATIONS:  Current Outpatient Prescriptions   Medication Sig Dispense Refill     Prenatal MV-Min-Fe Fum-FA-DHA (PRENATAL 1 PO) Take by mouth daily       ondansetron (ZOFRAN ODT) 4 MG disintegrating tablet Take 1-2 tablets (4-8 mg) by mouth every 8 hours as needed for nausea 20 tablet 1     folic acid (FOLVITE) 400 MCG tablet Take 2 tablets (800 mcg) by mouth daily (Patient taking differently: Take 1,200 mcg by mouth daily ) 180 tablet 3     Cholecalciferol (VITAMIN D) 2000 UNITS tablet Take 2,000 Units by mouth daily 100 tablet 3       PAST SURGICAL HISTORY:  Past Surgical History:   Procedure Laterality Date     CARDIAC SURGERY      x 3 @ ages , 11 months, 13 years      SECTION      x 2     CHOLECYSTECTOMY, LAPOROSCOPIC  2014    Cholecystectomy, Laparoscopic       ALLERGIES  Blood transfusion related (informational only)    FAMILY HX:  Family History   Problem Relation Age of Onset     DIABETES Maternal Grandmother      Hypertension Father      Hypertension Sister      Multiple Sclerosis Mother      CEREBROVASCULAR DISEASE Mother      Coronary Artery Disease No family hx of      Depression No family hx of      Anxiety Disorder No family hx of      Anesthesia Reaction No family hx of      CANCER No family hx of      Thyroid Disease No family hx of      Glaucoma No family hx of      Macular Degeneration No family hx of        SOCIAL HX:  Social History     Social History     Marital Status:      Spouse Name: Pascual     Number of Children: 2     Years of Education: N/A     Occupational History     pharmacy tech      [employer]     Social History Main Topics     Smoking status: Never Smoker      Smokeless tobacco: Never Used     Alcohol Use: No     Drug Use: No     Sexual Activity:     Partners: Male     Birth Control/ Protection: None     Other Topics Concern      None     Social History Narrative    From [place] and [place].        ROS:  Constitutional: No fever, chills, or sweats. No weight gain/loss.   HEENT: No visual disturbance, ear ache, epistaxis, sore throat.   Allergies/Immunologic: Negative.   Respiratory: No cough, hemoptysis.   Cardiovascular: As per HPI.   GI: No nausea, vomiting, hematemesis, melena, or hematochezia.   : No urinary frequency, dysuria, or hematuria.   Integument: No rash.   Psychiatric: No anxiety / depression.   Neuro: No speech disturbance, focal sensory or motor deficit.   Endocrinology: No polyuria / polyphagia.   Musculoskeletal: No myalgia.    VITAL SIGNS:  /69  Pulse 75  LMP 2016 (Exact Date)  SpO2 100%  There is no height or weight on file to calculate BMI.  Wt Readings from Last 2 Encounters:   17 78 kg (172 lb)   16 81.6 kg (180 lb)       PHYSICAL EXAM  General: appears comfortable, alert and articulate  Head: normocephalic, atraumatic  Eyes: anicteric sclera, EOMI  Neck: no adenopathy, 2+ carotids without bruits  Orophyarynx: moist mucosa, no lesions, dentition intact  Heart: Normal S1 and widened splitting of S2.  Early short diastolic murmur and soft systolic flow murmur at LUSB. No rub, or gallop.    Lungs: clear, no rales or wheezing  Abdomen: soft, non-tender, bowel sounds present. Gravid.  Extremities: no clubbing, cyanosis or edema.  Neurological: normal speech and affect, no gross motor deficits       LABS  Recent Labs   Lab Test  16   1657  07/22/15   0845  07/09/15   1224   WBC  13.7*   --   7.8   HGB  11.2*  10.8*  10.4*   HCT  35.3   --   34.7*   PLT  301   --   294     Recent Labs   Lab Test  16   1657  07/09/15   1224   NA  138  143   POTASSIUM  3.7  4.5   CHLORIDE  107  111*   CO2  24  28   GLC  84  78   BUN  7  7   CR  0.74  0.78   LISBET  12.6*  12.0*     EK/15/16  NSR.  RBBB.  0.13/0.14/0.419  RBBB    ECHO (16):   H/O TOF repair.  Global and regional left ventricular function is  normal with an EF of 55-60%.  Mild right ventricular dilation is present.  Global right ventricular function is normal.  Moderate pulmonic insufficiency is present.  Mean pulmonary gradient 14 mmHg.  Ascending aorta 3.7 cm.  Sinuses of Valsalva 3.7 cm.    (10/10/16):   Ziopatch showed rare PAC and rare PVC.  No significant / complex arrhythmias.    Echo today  Patient after transannular patch repair for tetralogy of Fallot. There is no  residual ventricular level shunt. There is mild to moderate right ventricular  enlargement. Normal right ventricular systolic function. Normal left ventricular systolic function with a calculated biplane left ventricular ejection fraction of 60-65%. Trivial tricuspid valve insufficiency. Estimated right ventricular systolic pressure is 25-30 mmHg plus right atrial pressure. There is mild flow acceleration across the pulmonary valve with a peak gradient across the pulmonary valve of 15-20 mmHg. Moderate to severe (3-4+) pulmonary valve insufficiency. There is mild dilation of the aortic root at  the level of the sinuses of Valsalva.      ASSESSMENT AND PLAN:  26 year old female with history of surgically repaired tetralogy of fallot who is currently 37 weeks pregnant presents for initial evaluation in the adult congenital clinic  1. Surgically corrected Tetralogy of Fallot (TOF).   Pt remains euvolemic today by history and exam.  Echo confirms normal LV systolic function.  She does have moderate to severe pulmonary regurgitation but pulmonary regurgitation is typically well tolerated during pregnancy.  She has no evidence of RV failure.  Recent Holter confirms no significant arrhythmias.  Would recommend judicious use of IV fluids during the peripartum period, otherwise no specific cardiac recommendations for her scheduled  except for SBE prophylaxis during delivery.  No cardiac telemetry monitoring needed during labor or the peripartum period.  Will need further cardiac  evaluation specifically to assess degree of pulmonary regurgitation in the now pregnant state and assess RV size and function.  After delivery, Encouraged patient to begin regular aerobic exercise 20-30 minutes a day, 4-5 times per week and follow low-salt, heart healthy diet.   Reminded patient of need to maintain good oral hygiene and continue regular dental care with SBE prophylaxis.  Will attempt to obtain surgical records if able.      FOLLOW UP:  In 8-9 months with cardiac MRI/MRA. Will be happy to see sooner if change in clinical status or new questions/concerns arise.      Jocelyn Cheney MD  Section Head - Advanced Heart Failure, Transplantation and Mechanical Circulatory Support  Co-Director - Adult Congenital and Cardiovascular Genetics Center  Associate Professor of Medicine, [facility]

## 2017-12-15 NOTE — NURSING NOTE
Chief Complaint   Patient presents with     New Patient     heart problem -- 26 yr old female with PMH significant for TOF and mechanical valve and is currently 37 weeks pregnant presenting for evaluation         Likely source GI  Occult blood ordered  Drop in H/H noted   Type and Scree  Transfuse if need.   Gi consult.

## 2018-03-28 ENCOUNTER — CARE COORDINATION (OUTPATIENT)
Dept: CARDIOLOGY | Facility: CLINIC | Age: 28
End: 2018-03-28

## 2018-03-28 DIAGNOSIS — Q21.3 TETRALOGY OF FALLOT: Primary | ICD-10-CM

## 2018-10-09 ENCOUNTER — HEALTH MAINTENANCE LETTER (OUTPATIENT)
Age: 28
End: 2018-10-09

## 2020-03-10 ENCOUNTER — HEALTH MAINTENANCE LETTER (OUTPATIENT)
Age: 30
End: 2020-03-10

## 2020-12-27 ENCOUNTER — HEALTH MAINTENANCE LETTER (OUTPATIENT)
Age: 30
End: 2020-12-27

## 2021-01-11 ENCOUNTER — ANCILLARY PROCEDURE (OUTPATIENT)
Dept: GENERAL RADIOLOGY | Facility: CLINIC | Age: 31
End: 2021-01-11
Attending: FAMILY MEDICINE
Payer: COMMERCIAL

## 2021-01-11 ENCOUNTER — OFFICE VISIT (OUTPATIENT)
Dept: ORTHOPEDICS | Facility: CLINIC | Age: 31
End: 2021-01-11
Payer: COMMERCIAL

## 2021-01-11 VITALS — WEIGHT: 175 LBS | BODY MASS INDEX: 28.12 KG/M2 | HEIGHT: 66 IN

## 2021-01-11 DIAGNOSIS — M25.571 ACUTE RIGHT ANKLE PAIN: Primary | ICD-10-CM

## 2021-01-11 DIAGNOSIS — S93.401A SPRAIN AND STRAIN OF RIGHT ANKLE: Primary | ICD-10-CM

## 2021-01-11 DIAGNOSIS — M79.671 RIGHT FOOT PAIN: ICD-10-CM

## 2021-01-11 DIAGNOSIS — S96.911A SPRAIN AND STRAIN OF RIGHT ANKLE: Primary | ICD-10-CM

## 2021-01-11 PROCEDURE — 73630 X-RAY EXAM OF FOOT: CPT | Mod: RT | Performed by: RADIOLOGY

## 2021-01-11 PROCEDURE — 73610 X-RAY EXAM OF ANKLE: CPT | Mod: RT | Performed by: RADIOLOGY

## 2021-01-11 PROCEDURE — 99203 OFFICE O/P NEW LOW 30 MIN: CPT | Performed by: FAMILY MEDICINE

## 2021-01-11 RX ORDER — DICLOFENAC SODIUM 75 MG/1
75 TABLET, DELAYED RELEASE ORAL 2 TIMES DAILY
Qty: 20 TABLET | Refills: 0 | Status: SHIPPED | OUTPATIENT
Start: 2021-01-11 | End: 2022-05-06

## 2021-01-11 ASSESSMENT — MIFFLIN-ST. JEOR: SCORE: 1525.54

## 2021-01-11 NOTE — LETTER
1/11/2021         RE: Brittani Gaviria  [address]  [place] [address]        Dear Colleague,    Thank you for referring your patient, Brittani Gaviria, to the [facility] [place]. Please see a copy of my visit note below.          SPORTS & ORTHOPEDIC WALK-IN VISIT 1/11/2021    Primary Care Physician: Dr. Lauryn Curtis    1/9/21 - was going down the stairs and was unsure of ADELINA, but she fell down the stairs.  Was experiencing throbbing pain since then. Sx are worsening when she WB.    Describes resting pain in the MTP of her 4th and 5th toes.  When WB, describes intense pain medially and between tibia/fibula (interosseus)    No prior R ankle injury.  So far has tried Ice and epsom salt bath.    Reason for visit:     What part of your body is injured / painful?  right ankle    What caused the injury /pain? Fall    How long ago did your injury occur or pain begin? several days ago    What are your most bothersome symptoms? Pain and mild Swelling    How would you characterize your symptom?  aching    What makes your symptoms better? Rest and Ice    What makes your symptoms worse? Standing, Walking and Movement    Have you been previously seen for this problem? No    Medical History:    Any recent changes to your medical history? No    Any new medication prescribed since last visit? No    Have you had surgery on this body part before? No    Social History:    Occupation: pharmacy tech    Handedness: Right    Exercise: walking, playing with kids    Review of Systems:    Do you have fever, chills, weight loss? No    Do you have any vision problems? No    Do you have any chest pain or edema? No    Do you have any shortness of breath or wheezing?  No    Do you have stomach problems? No    Do you have any numbness or focal weakness? No    Do you have diabetes? No    Do you have problems with bleeding or clotting? No    Do you have an rashes or other skin lesions?  No           CHIEF COMPLAINT:  Pain of the Right Ankle       HISTORY OF PRESENT ILLNESS  Ms. Gaviria is a pleasant 30 year old year old female who presents to clinic today with acute pain of right ankle.  Pain of right ankle began on 21 - was going down the stairs and was unsure of ADELINA, but she fell down the stairs. After fall, experiencing throbbing pain since then. Sx are worsening when she WB.     Describes resting pain in the MTP of her 4th and 5th toes.Pain of medial ankle and calf. No prior R ankle injuries.    Treatment: So far has tried Ice and epsom salt bath.     Reason for visit:     What part of your body is injured / painful?  right ankle    What caused the injury /pain? Fall    How long ago did your injury occur or pain begin? several days ago    What are your most bothersome symptoms? Pain and mild Swelling    How would you characterize your symptom?  aching    What makes your symptoms better? Rest and Ice    What makes your symptoms worse? Standing, Walking and Movement    Have you been previously seen for this problem? No    Additional history: as documented    MEDICAL HISTORY  Patient Active Problem List   Diagnosis     CARDIOVASCULAR SCREENING; LDL GOAL LESS THAN 160     Tetralogy of Fallot     Hypercalcemia     Migraine without aura and without status migrainosus, not intractable     Supervision of other normal pregnancy, antepartum     Nausea/vomiting in pregnancy     Previous  delivery, antepartum condition or complication     Hyperparathyroidism (H)     Rh sensitization, unspecified trimester, not applicable or unspecified fetus     Anemia affecting pregnancy     Patient is followed by the Adult Congenital and CV Genetics Clinic       Current Outpatient Medications   Medication Sig Dispense Refill     Cholecalciferol (VITAMIN D) 2000 UNITS tablet Take 2,000 Units by mouth daily 100 tablet 3     diclofenac (VOLTAREN) 75 MG EC tablet Take 1 tablet (75 mg) by mouth 2 times daily 20  "tablet 0     folic acid (FOLVITE) 400 MCG tablet Take 2 tablets (800 mcg) by mouth daily (Patient taking differently: Take 1,200 mcg by mouth daily ) 180 tablet 3     ondansetron (ZOFRAN ODT) 4 MG disintegrating tablet Take 1-2 tablets (4-8 mg) by mouth every 8 hours as needed for nausea 20 tablet 1     Prenatal MV-Min-Fe Fum-FA-DHA (PRENATAL 1 PO) Take by mouth daily         Allergies   Allergen Reactions     Blood Transfusion Related (Informational Only) Other (See Comments)     Patient has a history of a clinically significant antibody against RBC antigens.  A delay in compatible RBCs may occur.       Family History   Problem Relation Age of Onset     Diabetes Maternal Grandmother      Hypertension Father      Hypertension Sister      Multiple Sclerosis Mother      Cerebrovascular Disease Mother      Coronary Artery Disease No family hx of      Depression No family hx of      Anxiety Disorder No family hx of      Anesthesia Reaction No family hx of      Cancer No family hx of      Thyroid Disease No family hx of      Glaucoma No family hx of      Macular Degeneration No family hx of        Additional medical/Social/Surgical histories reviewed in [facility] and updated as appropriate.     REVIEW OF SYSTEMS (1/12/2021)  A 10-point review of systems was obtained and is negative except for as noted in the HPI.      PHYSICAL EXAM  Ht 1.676 m (5' 6\")   Wt 79.4 kg (175 lb)   BMI 28.25 kg/m      General  - normal appearance, in no obvious distress  HEENT  - conjunctivae not injected, moist mucous membranes  CV  - normal pulses at posterior tib and dorsalis pedis  Pulm  - normal respiratory pattern, non-labored  Musculoskeletal - ankle and foot  - stance: gait favors affected side  - inspection: No swelling, normal bone and joint alignment, no obvious deformity  - palpation: tenderness over ATFL, tenderness anterior ankle mortise, tenderness medially at posterior tibial tendon extending into musculature at medial calf, no " tenderness over lateral or medial malleoli, navicular, or base of 5th MT  - ROM: intact globally, painful inversion/eversion  - strength: 4/5 in eversion, 5/5 in all other planes  - special tests:  pain with inversion stress  (-) anterior drawer  (-) talar tilt  (-) Tinel's  (-) squeeze test  Neuro  - no sensory or motor deficit, grossly normal coordination, normal muscle tone  Skin  - No ecchymosis, no warmth or induration, no obvious rash  Psych  - interactive, appropriate, normal mood and affect    IMAGING : XR right ankle and foot. Final results and radiologist's interpretation, available in the [facility] health record. Images were reviewed with the patient/family members in the office today. My personal interpretation of the performed imaging is no acute osseous abnormalities.     ASSESSMENT & PLAN  Ms. Gaviria is a 30 year old year old female who presents to clinic today with acute ankle and foot pain.  Consistent with ankle sprain mechanism and strain of posterior tibial muscle and tendon, possible contusion of fifth MTP with capsulitis/sprain.    Diagnosis:  (S93.401A,  S96.911A) Sprain and strain of right ankle  (primary encounter diagnosis)    (M79.671) Right foot pain    -Tall cam boot for calf and foot pain  -Continue WBAT in boot  -Ice, elevation  -Diclofenac BID x 1 week  -Follow up 1 week in office to discuss boot weaning and rehabilitation, reexamination.    It was a pleasure seeing Brittani today.    Antoine Ferrer DO, CAQSM  Primary Care Sports Medicine

## 2021-01-11 NOTE — PROGRESS NOTES
SPORTS & ORTHOPEDIC WALK-IN VISIT 1/11/2021    Primary Care Physician: Dr. Lauryn Curtis    1/9/21 - was going down the stairs and was unsure of ADELINA, but she fell down the stairs.  Was experiencing throbbing pain since then. Sx are worsening when she WB.    Describes resting pain in the MTP of her 4th and 5th toes.  When WB, describes intense pain medially and between tibia/fibula (interosseus)    No prior R ankle injury.  So far has tried Ice and epsom salt bath.    Reason for visit:     What part of your body is injured / painful?  right ankle    What caused the injury /pain? Fall    How long ago did your injury occur or pain begin? several days ago    What are your most bothersome symptoms? Pain and mild Swelling    How would you characterize your symptom?  aching    What makes your symptoms better? Rest and Ice    What makes your symptoms worse? Standing, Walking and Movement    Have you been previously seen for this problem? No    Medical History:    Any recent changes to your medical history? No    Any new medication prescribed since last visit? No    Have you had surgery on this body part before? No    Social History:    Occupation: pharmacy tech    Handedness: Right    Exercise: walking, playing with kids    Review of Systems:    Do you have fever, chills, weight loss? No    Do you have any vision problems? No    Do you have any chest pain or edema? No    Do you have any shortness of breath or wheezing?  No    Do you have stomach problems? No    Do you have any numbness or focal weakness? No    Do you have diabetes? No    Do you have problems with bleeding or clotting? No    Do you have an rashes or other skin lesions? No

## 2021-01-12 NOTE — PROGRESS NOTES
CHIEF COMPLAINT:  Pain of the Right Ankle       HISTORY OF PRESENT ILLNESS  Ms. Gaviria is a pleasant 30 year old year old female who presents to clinic today with acute pain of right ankle.  Pain of right ankle began on 21 - was going down the stairs and was unsure of ADELINA, but she fell down the stairs. After fall, experiencing throbbing pain since then. Sx are worsening when she WB.     Describes resting pain in the MTP of her 4th and 5th toes.Pain of medial ankle and calf. No prior R ankle injuries.    Treatment: So far has tried Ice and epsom salt bath.     Reason for visit:     What part of your body is injured / painful?  right ankle    What caused the injury /pain? Fall    How long ago did your injury occur or pain begin? several days ago    What are your most bothersome symptoms? Pain and mild Swelling    How would you characterize your symptom?  aching    What makes your symptoms better? Rest and Ice    What makes your symptoms worse? Standing, Walking and Movement    Have you been previously seen for this problem? No    Additional history: as documented    MEDICAL HISTORY  Patient Active Problem List   Diagnosis     CARDIOVASCULAR SCREENING; LDL GOAL LESS THAN 160     Tetralogy of Fallot     Hypercalcemia     Migraine without aura and without status migrainosus, not intractable     Supervision of other normal pregnancy, antepartum     Nausea/vomiting in pregnancy     Previous  delivery, antepartum condition or complication     Hyperparathyroidism (H)     Rh sensitization, unspecified trimester, not applicable or unspecified fetus     Anemia affecting pregnancy     Patient is followed by the Adult Congenital and CV Genetics Clinic       Current Outpatient Medications   Medication Sig Dispense Refill     Cholecalciferol (VITAMIN D) 2000 UNITS tablet Take 2,000 Units by mouth daily 100 tablet 3     diclofenac (VOLTAREN) 75 MG EC tablet Take 1 tablet (75 mg) by mouth 2 times daily 20 tablet 0      "folic acid (FOLVITE) 400 MCG tablet Take 2 tablets (800 mcg) by mouth daily (Patient taking differently: Take 1,200 mcg by mouth daily ) 180 tablet 3     ondansetron (ZOFRAN ODT) 4 MG disintegrating tablet Take 1-2 tablets (4-8 mg) by mouth every 8 hours as needed for nausea 20 tablet 1     Prenatal MV-Min-Fe Fum-FA-DHA (PRENATAL 1 PO) Take by mouth daily         Allergies   Allergen Reactions     Blood Transfusion Related (Informational Only) Other (See Comments)     Patient has a history of a clinically significant antibody against RBC antigens.  A delay in compatible RBCs may occur.       Family History   Problem Relation Age of Onset     Diabetes Maternal Grandmother      Hypertension Father      Hypertension Sister      Multiple Sclerosis Mother      Cerebrovascular Disease Mother      Coronary Artery Disease No family hx of      Depression No family hx of      Anxiety Disorder No family hx of      Anesthesia Reaction No family hx of      Cancer No family hx of      Thyroid Disease No family hx of      Glaucoma No family hx of      Macular Degeneration No family hx of        Additional medical/Social/Surgical histories reviewed in [facility] and updated as appropriate.     REVIEW OF SYSTEMS (1/12/2021)  A 10-point review of systems was obtained and is negative except for as noted in the HPI.      PHYSICAL EXAM  Ht 1.676 m (5' 6\")   Wt 79.4 kg (175 lb)   BMI 28.25 kg/m      General  - normal appearance, in no obvious distress  HEENT  - conjunctivae not injected, moist mucous membranes  CV  - normal pulses at posterior tib and dorsalis pedis  Pulm  - normal respiratory pattern, non-labored  Musculoskeletal - ankle and foot  - stance: gait favors affected side  - inspection: No swelling, normal bone and joint alignment, no obvious deformity  - palpation: tenderness over ATFL, tenderness anterior ankle mortise, tenderness medially at posterior tibial tendon extending into musculature at medial calf, no tenderness over " lateral or medial malleoli, navicular, or base of 5th MT  - ROM: intact globally, painful inversion/eversion  - strength: 4/5 in eversion, 5/5 in all other planes  - special tests:  pain with inversion stress  (-) anterior drawer  (-) talar tilt  (-) Tinel's  (-) squeeze test  Neuro  - no sensory or motor deficit, grossly normal coordination, normal muscle tone  Skin  - No ecchymosis, no warmth or induration, no obvious rash  Psych  - interactive, appropriate, normal mood and affect    IMAGING : XR right ankle and foot. Final results and radiologist's interpretation, available in the [facility] health record. Images were reviewed with the patient/family members in the office today. My personal interpretation of the performed imaging is no acute osseous abnormalities.     ASSESSMENT & PLAN  Ms. Gaviria is a 30 year old year old female who presents to clinic today with acute ankle and foot pain.  Consistent with ankle sprain mechanism and strain of posterior tibial muscle and tendon, possible contusion of fifth MTP with capsulitis/sprain.    Diagnosis:  (S93.401A,  S96.911A) Sprain and strain of right ankle  (primary encounter diagnosis)    (M79.671) Right foot pain    -Tall cam boot for calf and foot pain  -Continue WBAT in boot  -Ice, elevation  -Diclofenac BID x 1 week  -Follow up 1 week in office to discuss boot weaning and rehabilitation, reexamination.    It was a pleasure seeing Brittani today.    Antoine Ferrer DO, CAQSM  Primary Care Sports Medicine

## 2021-01-18 ENCOUNTER — OFFICE VISIT (OUTPATIENT)
Dept: ORTHOPEDICS | Facility: CLINIC | Age: 31
End: 2021-01-18
Payer: COMMERCIAL

## 2021-01-18 VITALS — WEIGHT: 175 LBS | HEIGHT: 66 IN | BODY MASS INDEX: 28.12 KG/M2

## 2021-01-18 DIAGNOSIS — M76.61 ACHILLES TENDINITIS OF RIGHT LOWER EXTREMITY: Primary | ICD-10-CM

## 2021-01-18 DIAGNOSIS — S93.401A SPRAIN OF RIGHT ANKLE, UNSPECIFIED LIGAMENT, INITIAL ENCOUNTER: ICD-10-CM

## 2021-01-18 PROCEDURE — 99214 OFFICE O/P EST MOD 30 MIN: CPT | Performed by: FAMILY MEDICINE

## 2021-01-18 RX ORDER — METHYLPREDNISOLONE 4 MG
TABLET, DOSE PACK ORAL
Qty: 21 TABLET | Refills: 0 | Status: SHIPPED | OUTPATIENT
Start: 2021-01-18 | End: 2022-05-06

## 2021-01-18 ASSESSMENT — MIFFLIN-ST. JEOR: SCORE: 1525.54

## 2021-01-18 NOTE — LETTER
January 18, 2021      Brittani Gaviria  [address]   [place] [address]        To Whom It May Concern:    Brittani Gaviria was seen in our clinic. She may continue to work with following restrictions: Infrequent standing, walking, lifting and bending.  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  Thank you for your understanding.      Sincerely,        Antoine Ferrer, DO

## 2021-01-18 NOTE — LETTER
1/18/2021         RE: Brittani Gaviria  [address]  [facility] [address]        Dear Colleague,    Thank you for referring your patient, Brittani Gaviria, to the [facility] ORTHOPEDIC WALKIN CLINIC [place]. Please see a copy of my visit note below.          SPORTS & ORTHOPEDIC WALK-IN FOLLOW-UP VISIT 1/18/2021    Interval History:     Follow up reason: 1 week follow up     Date of injury/onset: 1/9/2021    Date last seen: 1/11/2021    Following Therapeutic Plan: Yes     Pain: Worsening    Function: NA    Interval History: CAM boot and diclofenac      Medical History:    Any recent changes to your medical history? No    Any new medication prescribed since last visit? Yes, diclofenac    Review of Systems:    Do you have fever, chills, weight loss? No    Do you have any vision problems? No    Do you have any chest pain or edema? No    Do you have any shortness of breath or wheezing?  No    Do you have stomach problems? No    Do you have any numbness or focal weakness? No    Do you have diabetes? No    Do you have problems with bleeding or clotting? No    Do you have an rashes or other skin lesions? No             ESTABLISHED PATIENT FOLLOW-UP:  RECHECK (Right ankle pain)       HISTORY OF PRESENT ILLNESS  Ms. Gaviria is a pleasant 30 year old year old female who presents to clinic today for follow-up of right ankle pain after inversion injury 1 week ago.      Follow up reason: 1 week follow up     Date of injury/onset: 1/9/2021    Date last seen: 1/11/2021    Following Therapeutic Plan: Yes     Pain: Worsening    Function: NA    Interval History: CAM boot and diclofenac, pain localizing to posterior foot and ankle.  Anterior tibial pain has resolved. Pain anterior ankle joint resolved however.  She still relies on boot for ambulation but some degree of pain.        Additional medical/Social/Surgical histories reviewed in [facility] and updated as appropriate.    REVIEW OF SYSTEMS  "(1/19/2021)  CONSTITUTIONAL: Denies fever and weight loss  GASTROINTESTINAL: Denies abdominal pain, nausea, vomiting  MUSCULOSKELETAL: See HPI  SKIN: Denies any recent rash or lesion  NEUROLOGICAL: Denies numbness or focal weakness     PHYSICAL EXAM  Ht 1.676 m (5' 6\")   Wt 79.4 kg (175 lb)   BMI 28.25 kg/m      General  - normal appearance, in no obvious distress  Musculoskeletal - foot / ankle  - stance: normal gait without limp, normal stance without excessive pronation, normal heel inversion with standing heel raise, no obvious leg length discrepancy, normal heel and toe walk  - inspection: normal bone and joint alignment, no obvious deformity  - palpation: tender over the terminal insertion of the Achilles and into mid substance tendon. Tender at ATFL anteriorly.  Additonal tenderness at fat pad of heel laterally.  - ROM: Pain with full dorsiflexion, normal plantar flexion, inversion, and eversion  - strength: 5/5 in all planes  Neuro  - no sensory or motor deficit, grossly normal coordination, normal muscle tone  Skin  - no ecchymosis, erythema, warmth, or induration, no obvious rash  Psych  - interactive, appropriate, normal mood and affect     ASSESSMENT & PLAN  Ms. Gaviria is a 30 year old year old female who presents to clinic today with RECHECK (Right ankle pain)    Suspect developing achilles tendinitis relating to her injury, additionally possibly fat pad contusion of heel also relating to fall down stairs.    -Continue cam boot for now until pain subsides  -Heel lifts provided to offload achilles  -Formal physical therapy prescribed  -Medrol pack x 6 days  -Continue icing  -Work note provided, light duty restrictions  -Follow up 2 weeks, may wean boot prior to this    It was a pleasure seeing Brittani.    Antoine Ferrer DO, [facility]  Primary Care Sports Medicine      "

## 2021-01-18 NOTE — PROGRESS NOTES
SPORTS & ORTHOPEDIC WALK-IN FOLLOW-UP VISIT 1/18/2021    Interval History:     Follow up reason: 1 week follow up     Date of injury/onset: 1/9/2021    Date last seen: 1/11/2021    Following Therapeutic Plan: Yes     Pain: Worsening    Function: NA    Interval History: CAM boot and diclofenac      Medical History:    Any recent changes to your medical history? No    Any new medication prescribed since last visit? Yes, diclofenac    Review of Systems:    Do you have fever, chills, weight loss? No    Do you have any vision problems? No    Do you have any chest pain or edema? No    Do you have any shortness of breath or wheezing?  No    Do you have stomach problems? No    Do you have any numbness or focal weakness? No    Do you have diabetes? No    Do you have problems with bleeding or clotting? No    Do you have an rashes or other skin lesions? No

## 2021-01-19 NOTE — PROGRESS NOTES
"ESTABLISHED PATIENT FOLLOW-UP:  RECHECK (Right ankle pain)       HISTORY OF PRESENT ILLNESS  Ms. Gaviria is a pleasant 30 year old year old female who presents to clinic today for follow-up of right ankle pain after inversion injury 1 week ago.      Follow up reason: 1 week follow up     Date of injury/onset: 1/9/2021    Date last seen: 1/11/2021    Following Therapeutic Plan: Yes     Pain: Worsening    Function: NA    Interval History: CAM boot and diclofenac, pain localizing to posterior foot and ankle.  Anterior tibial pain has resolved. Pain anterior ankle joint resolved however.  She still relies on boot for ambulation but some degree of pain.        Additional medical/Social/Surgical histories reviewed in [facility] and updated as appropriate.    REVIEW OF SYSTEMS (1/19/2021)  CONSTITUTIONAL: Denies fever and weight loss  GASTROINTESTINAL: Denies abdominal pain, nausea, vomiting  MUSCULOSKELETAL: See HPI  SKIN: Denies any recent rash or lesion  NEUROLOGICAL: Denies numbness or focal weakness     PHYSICAL EXAM  Ht 1.676 m (5' 6\")   Wt 79.4 kg (175 lb)   BMI 28.25 kg/m      General  - normal appearance, in no obvious distress  Musculoskeletal - foot / ankle  - stance: normal gait without limp, normal stance without excessive pronation, normal heel inversion with standing heel raise, no obvious leg length discrepancy, normal heel and toe walk  - inspection: normal bone and joint alignment, no obvious deformity  - palpation: tender over the terminal insertion of the Achilles and into mid substance tendon. Tender at ATFL anteriorly.  Additonal tenderness at fat pad of heel laterally.  - ROM: Pain with full dorsiflexion, normal plantar flexion, inversion, and eversion  - strength: 5/5 in all planes  Neuro  - no sensory or motor deficit, grossly normal coordination, normal muscle tone  Skin  - no ecchymosis, erythema, warmth, or induration, no obvious rash  Psych  - interactive, appropriate, normal mood and affect   "   ASSESSMENT & PLAN  Ms. Gaviria is a 30 year old year old female who presents to clinic today with RECHECK (Right ankle pain)    Suspect developing achilles tendinitis relating to her injury, additionally possibly fat pad contusion of heel also relating to fall down stairs.    -Continue cam boot for now until pain subsides  -Heel lifts provided to offload achilles  -Formal physical therapy prescribed  -Medrol pack x 6 days  -Continue icing  -Work note provided, light duty restrictions  -Follow up 2 weeks, may wean boot prior to this    It was a pleasure seeing Brittani.    Antoine Ferrer DO, [facility]  Primary Care Sports Medicine

## 2021-02-01 ENCOUNTER — OFFICE VISIT (OUTPATIENT)
Dept: ORTHOPEDICS | Facility: CLINIC | Age: 31
End: 2021-02-01
Payer: COMMERCIAL

## 2021-02-01 VITALS — WEIGHT: 175 LBS | HEIGHT: 65 IN | BODY MASS INDEX: 29.16 KG/M2

## 2021-02-01 DIAGNOSIS — S93.491D SPRAIN OF ANTERIOR TALOFIBULAR LIGAMENT OF RIGHT ANKLE, SUBSEQUENT ENCOUNTER: Primary | ICD-10-CM

## 2021-02-01 DIAGNOSIS — M25.571 ACUTE RIGHT ANKLE PAIN: ICD-10-CM

## 2021-02-01 PROCEDURE — 99213 OFFICE O/P EST LOW 20 MIN: CPT | Performed by: FAMILY MEDICINE

## 2021-02-01 ASSESSMENT — MIFFLIN-ST. JEOR: SCORE: 1509.67

## 2021-02-01 NOTE — PROGRESS NOTES
"ESTABLISHED PATIENT FOLLOW-UP:  Follow Up of the Right Ankle       HISTORY OF PRESENT ILLNESS  Ms. Gaviria is a pleasant 30 year old year old female who presents to clinic today for follow-up of right ankle pain.      Follow up reason: 2 wk f/u achilles tendinitis vs fat pad injury     Date of injury/onset: 1/9/21    Date last seen: 1/18/21    Following Therapeutic Plan: Yes     Pain: Unchanged    Function: Unchanged    Interval History: Pain while wearing boot is gone, but when not wearing boot still in pain, hurts worse to wear a shoe than to not wear a shoe, pain is lateral side of foot and in the arch, pain will shoot up leg at night time. No bruising or swelling. Medrol pack was helpful.  Concerned about her restrictions ending in two days as she is not ready for full work duties in boot.    Additional medical/Social/Surgical histories reviewed in [facility] and updated as appropriate.    REVIEW OF SYSTEMS (2/1/2021)  CONSTITUTIONAL: Denies fever and weight loss  GASTROINTESTINAL: Denies abdominal pain, nausea, vomiting  MUSCULOSKELETAL: See HPI  SKIN: Denies any recent rash or lesion  NEUROLOGICAL: Denies numbness or focal weakness     PHYSICAL EXAM  Ht 1.651 m (5' 5\")   Wt 79.4 kg (175 lb)   BMI 29.12 kg/m      General  - normal appearance, in no obvious distress  Musculoskeletal - foot / ankle  - stance: normal gait without limp, normal stance without excessive pronation, normal heel inversion with standing heel raise, no obvious leg length discrepancy, normal heel and toe walk  - inspection: normal bone and joint alignment, no obvious deformity  - palpation: tender at ATFL laterally, tender peroneal tendons at malleolus, tender at posterior tibial tendons.  No achilles or fat pad tenderness today.  Tender arch of foot medially.  - ROM: Normal dorsiflexion normal plantar flexion, inversion, and eversion  - strength: 4/5 eversion, 5/5 other planes  Neuro  - no sensory or motor deficit, grossly normal " coordination, normal muscle tone  Skin  - no ecchymosis, erythema, warmth, or induration, no obvious rash  Psych  - interactive, appropriate, normal mood and affect     ASSESSMENT & PLAN  Ms. Gaviria is a 30 year old year old female who presents to clinic today with Follow Up of the Right Ankle    Diagnosis  Acute pain of right ankle  Sprain of right ankle    -Transition into lace-up ankle brace  -PT scheduled in 4 days  -HEP to begin tonight; provided ROM exercises  -Naproxen BID PRN OTC  -Letter written for lighter duty x 3 weeks  -Follow up 3 weeks    It was a pleasure seeing Brittani.    Antoine Ferrer DO, [facility]  Primary Care Sports Medicine

## 2021-02-01 NOTE — LETTER
2/1/2021         RE: Brittani Gaviria  [address]  [facility] [facility] [address]        Dear Colleague,    Thank you for referring your patient, Brittani Gaviria, to the [facility] ORTHOPEDIC WALKIN CLINIC [place]. Please see a copy of my visit note below.          SPORTS & ORTHOPEDIC WALK-IN FOLLOW-UP VISIT 2/1/2021    Interval History:     Follow up reason: 2 wk f/u achilles tendinitis vs fat pad injury     Date of injury/onset: 1/9/21    Date last seen: 1/18/21    Following Therapeutic Plan: Yes     Pain: Unchanged    Function: Unchanged    Interval History: Pain while wearing boot is gone, but when not wearing boot still in pain, hurts worse to wear a shoe than to not wear a shoe, pain is lateral side of foot and in the arch, pain will shoot up leg at night time. No bruising or swelling.     Medical History:    Any recent changes to your medical history? No    Any new medication prescribed since last visit? No    Review of Systems:    Do you have fever, chills, weight loss? No    Do you have any vision problems? No    Do you have any chest pain or edema? No    Do you have any shortness of breath or wheezing?  No    Do you have stomach problems? No    Do you have any numbness or focal weakness? No    Do you have diabetes? No    Do you have problems with bleeding or clotting? No    Do you have an rashes or other skin lesions? No             ESTABLISHED PATIENT FOLLOW-UP:  Follow Up of the Right Ankle       HISTORY OF PRESENT ILLNESS  Ms. Gaviria is a pleasant 30 year old year old female who presents to clinic today for follow-up of right ankle pain.      Follow up reason: 2 wk f/u achilles tendinitis vs fat pad injury     Date of injury/onset: 1/9/21    Date last seen: 1/18/21    Following Therapeutic Plan: Yes     Pain: Unchanged    Function: Unchanged    Interval History: Pain while wearing boot is gone, but when not wearing boot still in pain, hurts worse to wear a shoe than to not wear a  "shoe, pain is lateral side of foot and in the arch, pain will shoot up leg at night time. No bruising or swelling. Medrol pack was helpful.  Concerned about her restrictions ending in two days as she is not ready for full work duties in boot.    Additional medical/Social/Surgical histories reviewed in [facility] and updated as appropriate.    REVIEW OF SYSTEMS (2/1/2021)  CONSTITUTIONAL: Denies fever and weight loss  GASTROINTESTINAL: Denies abdominal pain, nausea, vomiting  MUSCULOSKELETAL: See HPI  SKIN: Denies any recent rash or lesion  NEUROLOGICAL: Denies numbness or focal weakness     PHYSICAL EXAM  Ht 1.651 m (5' 5\")   Wt 79.4 kg (175 lb)   BMI 29.12 kg/m      General  - normal appearance, in no obvious distress  Musculoskeletal - foot / ankle  - stance: normal gait without limp, normal stance without excessive pronation, normal heel inversion with standing heel raise, no obvious leg length discrepancy, normal heel and toe walk  - inspection: normal bone and joint alignment, no obvious deformity  - palpation: tender at ATFL laterally, tender peroneal tendons at malleolus, tender at posterior tibial tendons.  No achilles or fat pad tenderness today.  Tender arch of foot medially.  - ROM: Normal dorsiflexion normal plantar flexion, inversion, and eversion  - strength: 4/5 eversion, 5/5 other planes  Neuro  - no sensory or motor deficit, grossly normal coordination, normal muscle tone  Skin  - no ecchymosis, erythema, warmth, or induration, no obvious rash  Psych  - interactive, appropriate, normal mood and affect     ASSESSMENT & PLAN  Ms. Gaviria is a 30 year old year old female who presents to clinic today with Follow Up of the Right Ankle    Diagnosis  Acute pain of right ankle  Sprain of right ankle    -Transition into lace-up ankle brace  -PT scheduled in 4 days  -HEP to begin tonight; provided ROM exercises  -Naproxen BID PRN OTC  -Letter written for lighter duty x 3 weeks  -Follow up 3 weeks    It was a " pleasure seeing Brittani.    Antoine Ferrer DO, [facility]  Primary Care Sports Medicine

## 2021-02-01 NOTE — LETTER
February 1, 2021      Brittani Gaviria  [address]  [facility] [place] [address]        To Whom It May Concern:    Brittani Gaviria was seen in our clinic. She may continue to work with following restrictions: Infrequent standing, walking, lifting and bending.  She will benefit from desk related light duties over the next three weeks, expiring 2/22/21.  Thank you for your understanding.      Sincerely,

## 2021-02-01 NOTE — PROGRESS NOTES
SPORTS & ORTHOPEDIC WALK-IN FOLLOW-UP VISIT 2/1/2021    Interval History:     Follow up reason: 2 wk f/u achilles tendinitis vs fat pad injury     Date of injury/onset: 1/9/21    Date last seen: 1/18/21    Following Therapeutic Plan: Yes     Pain: Unchanged    Function: Unchanged    Interval History: Pain while wearing boot is gone, but when not wearing boot still in pain, hurts worse to wear a shoe than to not wear a shoe, pain is lateral side of foot and in the arch, pain will shoot up leg at night time. No bruising or swelling.     Medical History:    Any recent changes to your medical history? No    Any new medication prescribed since last visit? No    Review of Systems:    Do you have fever, chills, weight loss? No    Do you have any vision problems? No    Do you have any chest pain or edema? No    Do you have any shortness of breath or wheezing?  No    Do you have stomach problems? No    Do you have any numbness or focal weakness? No    Do you have diabetes? No    Do you have problems with bleeding or clotting? No    Do you have an rashes or other skin lesions? No

## 2021-02-15 ENCOUNTER — OFFICE VISIT (OUTPATIENT)
Dept: ORTHOPEDICS | Facility: CLINIC | Age: 31
End: 2021-02-15
Payer: COMMERCIAL

## 2021-02-15 VITALS — HEIGHT: 65 IN | WEIGHT: 175 LBS | BODY MASS INDEX: 29.16 KG/M2

## 2021-02-15 DIAGNOSIS — M25.571 ACUTE RIGHT ANKLE PAIN: ICD-10-CM

## 2021-02-15 DIAGNOSIS — S93.491D SPRAIN OF ANTERIOR TALOFIBULAR LIGAMENT OF RIGHT ANKLE, SUBSEQUENT ENCOUNTER: Primary | ICD-10-CM

## 2021-02-15 PROCEDURE — 99213 OFFICE O/P EST LOW 20 MIN: CPT | Performed by: FAMILY MEDICINE

## 2021-02-15 ASSESSMENT — MIFFLIN-ST. JEOR: SCORE: 1509.67

## 2021-02-15 NOTE — PROGRESS NOTES
"ESTABLISHED PATIENT FOLLOW-UP:  Follow Up of the Right Ankle       HISTORY OF PRESENT ILLNESS  Ms. Gaviria is a pleasant 30 year old year old female who presents to clinic today for follow-up of right ankle sprain.    Interval History:     Follow up reason: 3 wk f/u R ankle injury    Date of injury/onset: 1/9/21    Date last seen: 2/1/21    Following Therapeutic Plan: Yes     Pain: Improving    Function: Imprroving    Interval Hx: Has transitioned to ankle brace and sneaker more often starting last Friday, 3 days ago. Notices some increased discomfort, but still feels confident that it feels like normal healing.  Plans to use ankle brace all day at work.  Still under modified work restrictions until next Monday.  Performing HEP and has theraband given last visit. Unable to go to PT due to work schedule and childcare issues.      Additional medical/Social/Surgical histories reviewed in [facility] and updated as appropriate.     PHYSICAL EXAM  Ht 1.651 m (5' 5\")   Wt 79.4 kg (175 lb)   BMI 29.12 kg/m      General  - normal appearance, in no obvious distress  Musculoskeletal - Right foot / ankle  - stance: Mildly antalgic gait out of the boot in ankle brace today.   - inspection: normal bone and joint alignment, no obvious deformity  - palpation: tender at ATFL laterally, tender peroneal tendons at malleolus  - ROM: Normal dorsiflexion normal plantar flexion, inversion, and eversion  - strength: 4/5 eversion, 5/5 other planes  Neuro  - no sensory or motor deficit, grossly normal coordination, normal muscle tone  Skin  - no ecchymosis, erythema, warmth, or induration, no obvious rash  Psych  - interactive, appropriate, normal mood and affect     ASSESSMENT & PLAN  Ms. Gaviria is a 30 year old year old female who presents to clinic today with Follow Up of the Right Ankle    Diagnosis: Sprain of right ankle, subsequent encounter    -Encouraged strengthening at this time, remove brace to perform  -Lace-up ankle brace until " strength has returned  -Ice, analgesics OTC PRN  -Work restrictions - until 2/22  -If pain in shoe, may start with half day out of boot and after lunch back in boot. Otherwise if doing well continue out of CAM boot.  -Consider formal PT again if pain persisting despite HEP  -Follow up PRN 1 week; may continue restrictions if she is hesitant to return to full duty next week.    It was a pleasure seeing Brittani.    Antoine Ferrer, DO, [facility]  Primary Care Sports Medicine

## 2021-02-15 NOTE — PROGRESS NOTES
SPORTS & ORTHOPEDIC WALK-IN FOLLOW-UP VISIT 2/15/2021    Has transitioned to ankle brace and sneaker more. Notices some increased discomfort, but still feels confident that it feels like normal healing.    Interval History:     Follow up reason: 3 wk f/u R ankle injury    Date of injury/onset: 1/9/21    Date last seen: 2/1/21    Following Therapeutic Plan: Yes     Pain: Improving    Function: Improving    Medical History:    Any recent changes to your medical history? No    Any new medication prescribed since last visit? No    Review of Systems:    Do you have fever, chills, weight loss? No    Do you have any vision problems? No    Do you have any chest pain or edema? No    Do you have any shortness of breath or wheezing?  No    Do you have stomach problems? No    Do you have any numbness or focal weakness? No    Do you have diabetes? No    Do you have problems with bleeding or clotting? No    Do you have an rashes or other skin lesions? No

## 2021-02-15 NOTE — LETTER
2/15/2021         RE: Brittain Gaviria  [address]  [place] [address]        Dear Colleague,    Thank you for referring your patient, Brittani Gaviria, to the [facility] ORTHOPEDIC WALKIN CLINIC [place]. Please see a copy of my visit note below.          SPORTS & ORTHOPEDIC WALK-IN FOLLOW-UP VISIT 2/15/2021    Has transitioned to ankle brace and sneaker more. Notices some increased discomfort, but still feels confident that it feels like normal healing.    Interval History:     Follow up reason: 3 wk f/u R ankle injury    Date of injury/onset: 1/9/21    Date last seen: 2/1/21    Following Therapeutic Plan: Yes     Pain: Improving    Function: Improving    Medical History:    Any recent changes to your medical history? No    Any new medication prescribed since last visit? No    Review of Systems:    Do you have fever, chills, weight loss? No    Do you have any vision problems? No    Do you have any chest pain or edema? No    Do you have any shortness of breath or wheezing?  No    Do you have stomach problems? No    Do you have any numbness or focal weakness? No    Do you have diabetes? No    Do you have problems with bleeding or clotting? No    Do you have an rashes or other skin lesions? No             ESTABLISHED PATIENT FOLLOW-UP:  Follow Up of the Right Ankle       HISTORY OF PRESENT ILLNESS  Ms. Gaviria is a pleasant 30 year old year old female who presents to clinic today for follow-up of right ankle sprain.    Interval History:     Follow up reason: 3 wk f/u R ankle injury    Date of injury/onset: 1/9/21    Date last seen: 2/1/21    Following Therapeutic Plan: Yes     Pain: Improving    Function: Imprroving    Interval Hx: Has transitioned to ankle brace and sneaker more often starting last Friday, 3 days ago. Notices some increased discomfort, but still feels confident that it feels like normal healing.  Plans to use ankle brace all day at work.  Still under modified work  "restrictions until next Monday.  Performing HEP and has theraband given last visit. Unable to go to PT due to work schedule and childcare issues.      Additional medical/Social/Surgical histories reviewed in [facility] and updated as appropriate.     PHYSICAL EXAM  Ht 1.651 m (5' 5\")   Wt 79.4 kg (175 lb)   BMI 29.12 kg/m      General  - normal appearance, in no obvious distress  Musculoskeletal - Right foot / ankle  - stance: Mildly antalgic gait out of the boot in ankle brace today.   - inspection: normal bone and joint alignment, no obvious deformity  - palpation: tender at ATFL laterally, tender peroneal tendons at malleolus  - ROM: Normal dorsiflexion normal plantar flexion, inversion, and eversion  - strength: 4/5 eversion, 5/5 other planes  Neuro  - no sensory or motor deficit, grossly normal coordination, normal muscle tone  Skin  - no ecchymosis, erythema, warmth, or induration, no obvious rash  Psych  - interactive, appropriate, normal mood and affect     ASSESSMENT & PLAN  Ms. Gaviria is a 30 year old year old female who presents to clinic today with Follow Up of the Right Ankle    Diagnosis: Sprain of right ankle, subsequent encounter    -Encouraged strengthening at this time, remove brace to perform  -Lace-up ankle brace until strength has returned  -Ice, analgesics OTC PRN  -Work restrictions - until 2/22  -If pain in shoe, may start with half day out of boot and after lunch back in boot. Otherwise if doing well continue out of CAM boot.  -Consider formal PT again if pain persisting despite HEP  -Follow up PRN 1 week; may continue restrictions if she is hesitant to return to full duty next week.    It was a pleasure seeing Brittani.    Antoine Ferrer DO, [facility]  Primary Care Sports Medicine        "

## 2021-03-17 NOTE — TELEPHONE ENCOUNTER
From: Joni Siddiqui MD           Sent: 2017  10:08 PM            To: Deyanira GISELLE Friedman              Please assist pt in scheduling IV iron treatment if able, notify MFM to send reports to me (not Dr. Joshi), notify  Nurse Navigator (Penny) at [facility] of pt and plan and contact info. Schedule repeat C/S- see other encounter      Pt called back to get details of . Spoke to Deyanira, . Pt already knew about needing IV iron. Orders noted as below. Deyanira transferred to hematology/onc for scheduling.  Deyanira notified MFM that care has been transferred her care from Dr. Joshi to Dr. Siddiqui. [facility] was already notified of the transfer and has updated their records.     This writer attempted to contact Penny  Nurse Navigator at [facility]  on 2017.    Was call answered?  No.  Left message on voicemail with information to call me back.    If patient calls back, please Contact Clinic RN team. If no one available, send encounter message    Fani Leiva RN, [facility]AN       Yes

## 2021-04-24 ENCOUNTER — HEALTH MAINTENANCE LETTER (OUTPATIENT)
Age: 31
End: 2021-04-24

## 2021-10-03 ENCOUNTER — HEALTH MAINTENANCE LETTER (OUTPATIENT)
Age: 31
End: 2021-10-03

## 2021-11-24 ENCOUNTER — TRANSFERRED RECORDS (OUTPATIENT)
Dept: HEALTH INFORMATION MANAGEMENT | Facility: CLINIC | Age: 31
End: 2021-11-24
Payer: COMMERCIAL

## 2022-01-26 ENCOUNTER — LAB REQUISITION (OUTPATIENT)
Dept: LAB | Facility: CLINIC | Age: 32
End: 2022-01-26

## 2022-01-26 LAB — SARS-COV-2 RNA RESP QL NAA+PROBE: NEGATIVE

## 2022-01-26 PROCEDURE — U0005 INFEC AGEN DETEC AMPLI PROBE: HCPCS | Performed by: INTERNAL MEDICINE

## 2022-02-25 ENCOUNTER — MEDICAL CORRESPONDENCE (OUTPATIENT)
Dept: HEALTH INFORMATION MANAGEMENT | Facility: CLINIC | Age: 32
End: 2022-02-25
Payer: COMMERCIAL

## 2022-02-25 ENCOUNTER — TELEPHONE (OUTPATIENT)
Dept: CARDIOLOGY | Facility: CLINIC | Age: 32
End: 2022-02-25
Payer: COMMERCIAL

## 2022-02-25 DIAGNOSIS — I51.7 ENLARGED RV (RIGHT VENTRICLE): ICD-10-CM

## 2022-02-25 DIAGNOSIS — I37.1 PULMONARY REGURGITATION: ICD-10-CM

## 2022-02-25 DIAGNOSIS — Q21.3 TETRALOGY OF FALLOT: Primary | ICD-10-CM

## 2022-02-25 NOTE — TELEPHONE ENCOUNTER
Calista from [facility] called to get this patient scheduled for an appointment.     Called calista to let him know that I will check with provider to see if patient needs testing and will reach out to her to schedule.

## 2022-02-25 NOTE — TELEPHONE ENCOUNTER
Date: 2/25/2022    Time of Call: 3:53 PM     Diagnosis:  Tetralogy of Fallot, pulmonary regurgitation, RV enlargement     [ TORB ] Ordering provider: Dr. Cheney  Order: Cardiac MRI/MRA prior to appt with Dr. Cheney and Dr. Valdes    TSH, Lipids and CMP  Order received by: JOSSIE Mendoza and Sadie Hurd RN     Follow-up/additional notes: Sent to scheduling. Need 2020 MRI pushed to PACS.    Need Operative Note from [facility], [facility] from when she was 13 years.

## 2022-03-01 ENCOUNTER — TELEPHONE (OUTPATIENT)
Dept: CARDIOLOGY | Facility: CLINIC | Age: 32
End: 2022-03-01
Payer: COMMERCIAL

## 2022-03-01 NOTE — TELEPHONE ENCOUNTER
[facility] Call Center    Phone Message    May a detailed message be left on voicemail: unknown     Reason for Call: Please call patient to schedule cardiology referral. Dx is not within the scope of [facility] scheduling guidelines. Thank you!      Action Taken: TE sent to clinic    Travel Screening: Not Applicable     Kelly Patino on 3/1/2022 at 2:52 PM

## 2022-04-13 ENCOUNTER — TELEPHONE (OUTPATIENT)
Dept: CARDIOLOGY | Facility: CLINIC | Age: 32
End: 2022-04-13
Payer: COMMERCIAL

## 2022-04-13 NOTE — TELEPHONE ENCOUNTER
[facility] Call Center    Phone Message    May a detailed message be left on voicemail: yes     Reason for Call: Other: Brittani called stating that her heart palpitations are getting worse. ANd would like to speak to Dr. Alcaraz. Please advise. Thank you.      Action Taken: Message routed to:  [facility] ([facility] Cardio    Travel Screening: Not Applicable

## 2022-04-13 NOTE — TELEPHONE ENCOUNTER
"Called Brittani. She reports she is having an increase in \"sticking/poking\" in her chest that lasts for a few seconds and then goes away. This will happen 5-6 times a day. She was started on metoprolol titrate a few months ago, then the dose was cut in half, then switched to metoprolol succinate. No of these helped. She is currently not taking any metoprolol.   Discussed with Deyanira Aleman and Dr. Valdes, they reviewed the note from [facility] in January with the zio results. Dr. Valdes needs to see her in clinic to make recommendations. Moved up her appointment to May 6. Patient in agreement with the plan.  Elodia Law RN on 4/13/2022 at 2:14 PM    "

## 2022-05-03 ASSESSMENT — ENCOUNTER SYMPTOMS
DECREASED CONCENTRATION: 0
ALTERED TEMPERATURE REGULATION: 1
ABDOMINAL PAIN: 0
NERVOUS/ANXIOUS: 1
BLOATING: 0
LEG PAIN: 0
WEIGHT GAIN: 0
MYALGIAS: 1
JOINT SWELLING: 0
CONSTIPATION: 0
HYPOTENSION: 0
INSOMNIA: 1
SHORTNESS OF BREATH: 1
EXERCISE INTOLERANCE: 0
SPUTUM PRODUCTION: 0
WEIGHT LOSS: 0
SYNCOPE: 0
HEARTBURN: 1
SLEEP DISTURBANCES DUE TO BREATHING: 1
MUSCLE WEAKNESS: 0
DIARRHEA: 0
SNORES LOUDLY: 0
HEMOPTYSIS: 0
HALLUCINATIONS: 1
INCREASED ENERGY: 1
BOWEL INCONTINENCE: 0
BLOOD IN STOOL: 0
NIGHT SWEATS: 0
ARTHRALGIAS: 0
RECTAL PAIN: 0
ORTHOPNEA: 0
COUGH: 0
BACK PAIN: 1
HYPERTENSION: 1
VOMITING: 0
POLYDIPSIA: 0
STIFFNESS: 0
FEVER: 0
DECREASED APPETITE: 0
PANIC: 0
DEPRESSION: 0
MUSCLE CRAMPS: 0
FATIGUE: 1
DYSPNEA ON EXERTION: 0
POSTURAL DYSPNEA: 0
NECK PAIN: 0
JAUNDICE: 0
CHILLS: 0
NAUSEA: 0
PALPITATIONS: 1
COUGH DISTURBING SLEEP: 0
LIGHT-HEADEDNESS: 1
WHEEZING: 0

## 2022-05-05 NOTE — PROGRESS NOTES
ELECTROPHYSIOLOGY CLINIC VISIT    Assessment/Recommendations   Assessment/Plan:      Ms. Gaviria is a 31 year old female who has a past medical history significant for TOF s/p surgical repair at 11 months, s/p pulmonary valve repair at 7 years, s/p pulmonary valve replacement at age 13 years, and palpitations.     Palpitations:   She began developing palpitations. She had an episode in 11/2021 with palpitations and dizziness and had to sit down. She was evaluated at OSH and ECG and echo were stable and no intervention was done. Her symptoms continued and she was seen again. A zio patch from OSH in 1/2022 reported to show 1 NSVT 6 beats, 45 PSVT up to 7.2 seconds, frequent PACs 14.4%, and rare isolated PVCs. 56 symptom activations reportedly corresponded to PAC/PVCs. She was started on Metoprolol. She had some weird hallucinations and metoprolol was lowered and this subsided. She started having some chest pains and metoprolol was decreased further but symptoms continued. Metoprolol was stopped in 4/5/22 due to this. We will work to get the tracings from the OSH zio patch monitor to evaluate further. We will start her on Diltiazem 120 mg CD daily for her symptoms.       Follow up as scheduled in 6/2022.        History of Present Illness/Subjective    Ms. Brittani Gaviria is a 31 year old female who comes in today for EP consultation of palpitations.    Ms. Gaviria is a 31 year old female who has a past medical history significant for TOF s/p surgical repair at 11 months, s/p pulmonary valve repair at 7 years, s/p pulmonary valve replacement at age 13 years, and palpitations.     She was born with TOF and underwent repair at 11 months. She required pulmonary valve repair at age 7 and the a pulmonary valve replacement at 13 years of age. Her last CMR at OSH showed normal biventricular function, severe RV dilation, moderate LV dilation, and moderate pulmonary valve regurgitation. She began developing palpitations.  She had an episode in 11/2021 with palpitations and dizziness and had to sit down. She was evaluated at OSH and ECG and echo were stable and no intervention was done. Her symptoms continued and she was seen again. A zio patch from OSH in 1/2022 reported to show 1 NSVT 6 beats, 45 PSVT up to 7.2 seconds, frequent PACs 14.4%, and rare isolated PVCs. 56 symptom activations reportedly corresponded to PAC/PVCs. She was started on Metoprolol. She had some weird hallucinations and metoprolol was lowered and this subsided. She started having some chest pains and metoprolol was decreased further but symptoms continued. Metoprolol was stopped in 4/5/22 due to this.     She reports feeling OK. She has noted her COOPER has progressed over last year. She continues to have palpitations many times a day for a minute or less. She denies chest discomfort, palpitations, abdominal fullness/bloating or peripheral edema, shortness of breath, paroxysmal nocturnal dyspnea, orthopnea, lightheadedness, dizziness, pre-syncope, or syncope. Presenting 12 lead ECG shows SR with PACs IVCD Vent Rate 73 bpm,  ms,  ms, QTc 464 ms. No current cardiac medications.       I have reviewed and updated the patient's Past Medical History, Social History, Family History and Medication List.     Cardiographics (Personally Reviewed) :   11/2021 Echo (OSH Report):  CONCLUSIONS   Patient is status post repair of tetralogy of Fallot.   1. Left ventricular ejection fraction is visually estimated at 65%.   Abnormal septal motion consistent with prior open heart surgery.   2. Moderate right ventricular dilation is present. Global right   ventricular systolic function is moderately reduced.   3. Mixed pulmonary valve disease. Pulmonic stenosis. Peak velocity   through the pulmonary valve is 2.5 m/sec and a peak gradient 27 mm of   Hg..   Probably severe pulmonic regurgitation.   4. Proximal ascending aorta measures at 3.9 cm.   Compared to the prior study  "dated June 11, 2019 , no significant   change was noted.     2/10/17 Echo:      CONCLUSIONS  Patient after transannular patch repair for tetralogy of Fallot. There is no  residual ventricular level shunt. There is mild to moderate right ventricular  enlargement. Normal right ventricular systolic function. Normal left  ventricular systolic function with a calculated biplane left ventricular  ejection fraction of 60-65%. Trivial tricuspid valve insufficiency. Estimated  right ventricular systolic pressure is 25-30 mmHg plus right atrial pressure.  There is mild flow acceleration across the pulmonary valve with a peak  gradient across the pulmonary valve of 15-20 mmHg. Moderate to severe (3-4+)  pulmonary valve insufficiency. There is mild dilation of the aortic root at  the level of the sinuses of Valsalva.    10/30/20 CMR (OSH Report):  Impression:   1. Normal left and right ventricular function, EF 55.6 and 46.9% respectively   2. Moderate left ventricular enlargement with severe right ventricular enlargement.   3. Overriding aortic root with mildly dilated sinus of Valsalva at 41.9 mm.   4. Moderate pulmonary regurgitation with a regurgitant volume of 44.2 mL, regurgitation fraction of 32.3%.   5. No residual VSD          Physical Examination   BP (!) 160/96 (BP Location: Right arm, Patient Position: Sitting, Cuff Size: Adult Large)   Pulse 71   Ht 1.682 m (5' 6.22\")   Wt 95.9 kg (211 lb 8 oz)   SpO2 99%   BMI 33.91 kg/m    Wt Readings from Last 3 Encounters:   02/15/21 79.4 kg (175 lb)   02/01/21 79.4 kg (175 lb)   01/18/21 79.4 kg (175 lb)     General Appearance:   Alert, well-appearing and in no acute distress.   HEENT: Atraumatic, normocephalic. PERRL.  MMM.   Chest/Lungs:   Respirations unlabored.  Lungs are clear to auscultation.   Cardiovascular:   Regular. Murmur present.    Abdomen:  Soft, nontender, nondistended.   Extremities: No cyanosis or clubbing. No edema.    Musculoskeletal: Moves all " extremities.  Gait normal.   Skin: Warm, dry, intact.    Neurologic: Mood and affect are appropriate.  Alert and oriented to person, place, time, and situation.          Medications  Allergies   Current Outpatient Medications   Medication Sig Dispense Refill     Cholecalciferol (VITAMIN D) 2000 UNITS tablet Take 2,000 Units by mouth daily 100 tablet 3     diclofenac (VOLTAREN) 75 MG EC tablet Take 1 tablet (75 mg) by mouth 2 times daily 20 tablet 0     methylPREDNISolone (MEDROL DOSEPAK) 4 MG tablet therapy pack Follow Package Directions 21 tablet 0    Allergies   Allergen Reactions     Blood Transfusion Related (Informational Only) Other (See Comments)     Patient has a history of a clinically significant antibody against RBC antigens.  A delay in compatible RBCs may occur.         Lab Results (Personally Reviewed)    Chemistry/lipid CBC Cardiac Enzymes/BNP/TSH/INR   Lab Results   Component Value Date    BUN 7 07/20/2016     07/20/2016    CO2 24 07/20/2016     Creatinine   Date Value Ref Range Status   07/20/2016 0.74 0.52 - 1.04 mg/dL Final       No results found for: CHOL, HDL, LDL, CHOLHDL   Lab Results   Component Value Date    WBC 12.8 (H) 11/30/2016    HGB  11/30/2016     CHANGE ORDER PER MD  CORRECTED ON 12/01 AT 0931: PREVIOUSLY REPORTED AS 8.1      HGB 8.1 (L) 11/30/2016    HCT 26.6 (L) 11/30/2016    MCV 86 11/30/2016     11/30/2016    Lab Results   Component Value Date    TSH 0.94 07/17/2015        The patient states understanding and is agreeable with the plan.   Casper Valdes MD [facility]  Cardiology - Electrophysiology    Total time spent on patient visit, reviewing notes, imaging, labs, orders, and completing necessary documentation: 45 minutes.

## 2022-05-06 ENCOUNTER — LAB (OUTPATIENT)
Dept: LAB | Facility: CLINIC | Age: 32
End: 2022-05-06
Payer: COMMERCIAL

## 2022-05-06 ENCOUNTER — OFFICE VISIT (OUTPATIENT)
Dept: CARDIOLOGY | Facility: CLINIC | Age: 32
End: 2022-05-06
Attending: INTERNAL MEDICINE
Payer: COMMERCIAL

## 2022-05-06 VITALS
HEIGHT: 66 IN | HEART RATE: 71 BPM | BODY MASS INDEX: 33.99 KG/M2 | DIASTOLIC BLOOD PRESSURE: 96 MMHG | WEIGHT: 211.5 LBS | OXYGEN SATURATION: 99 % | SYSTOLIC BLOOD PRESSURE: 160 MMHG

## 2022-05-06 DIAGNOSIS — I37.1 PULMONARY REGURGITATION: ICD-10-CM

## 2022-05-06 DIAGNOSIS — I51.7 ENLARGED RV (RIGHT VENTRICLE): ICD-10-CM

## 2022-05-06 DIAGNOSIS — Q21.3 TOF (TETRALOGY OF FALLOT): Primary | ICD-10-CM

## 2022-05-06 DIAGNOSIS — Q21.3 TETRALOGY OF FALLOT: ICD-10-CM

## 2022-05-06 LAB
CHOLEST SERPL-MCNC: 171 MG/DL
FASTING STATUS PATIENT QL REPORTED: YES
HDLC SERPL-MCNC: 48 MG/DL
LDLC SERPL CALC-MCNC: 111 MG/DL
NONHDLC SERPL-MCNC: 123 MG/DL
TRIGL SERPL-MCNC: 59 MG/DL
TSH SERPL DL<=0.005 MIU/L-ACNC: 1.27 MU/L (ref 0.4–4)

## 2022-05-06 PROCEDURE — 36415 COLL VENOUS BLD VENIPUNCTURE: CPT | Performed by: PATHOLOGY

## 2022-05-06 PROCEDURE — 93005 ELECTROCARDIOGRAM TRACING: CPT

## 2022-05-06 PROCEDURE — 84443 ASSAY THYROID STIM HORMONE: CPT | Performed by: PATHOLOGY

## 2022-05-06 PROCEDURE — 99204 OFFICE O/P NEW MOD 45 MIN: CPT | Performed by: INTERNAL MEDICINE

## 2022-05-06 PROCEDURE — G0463 HOSPITAL OUTPT CLINIC VISIT: HCPCS | Mod: 25

## 2022-05-06 PROCEDURE — 80061 LIPID PANEL: CPT | Performed by: PATHOLOGY

## 2022-05-06 RX ORDER — CYCLOBENZAPRINE HCL 5 MG
5-10 TABLET ORAL PRN
COMMUNITY
Start: 2021-08-24

## 2022-05-06 RX ORDER — TRETINOIN 0.025 %
CREAM (GRAM) TOPICAL PRN
COMMUNITY
Start: 2021-10-29

## 2022-05-06 RX ORDER — DILTIAZEM HYDROCHLORIDE 120 MG/1
120 CAPSULE, COATED, EXTENDED RELEASE ORAL DAILY
Qty: 90 CAPSULE | Refills: 3 | Status: SHIPPED | OUTPATIENT
Start: 2022-05-06 | End: 2022-06-03

## 2022-05-06 RX ORDER — SUMATRIPTAN 6 MG/.5ML
INJECTION, SOLUTION SUBCUTANEOUS
COMMUNITY
End: 2022-09-26

## 2022-05-06 RX ORDER — ACETAMINOPHEN 325 MG/1
325-650 TABLET ORAL PRN
COMMUNITY

## 2022-05-06 RX ORDER — AZELASTINE 1 MG/ML
SPRAY, METERED NASAL
COMMUNITY
Start: 2022-03-20 | End: 2023-11-17

## 2022-05-06 RX ORDER — CLINDAMYCIN PHOSPHATE 11.9 MG/ML
SOLUTION TOPICAL PRN
COMMUNITY
Start: 2022-01-17

## 2022-05-06 ASSESSMENT — PAIN SCALES - GENERAL: PAINLEVEL: MODERATE PAIN (4)

## 2022-05-06 NOTE — NURSING NOTE
Cardiac Testing: Patient given instructions regarding  Cardiac MRI. Discussed purpose, preparation, procedure and when to expect results reported back to the patient. Patient demonstrated understanding of this information and agreed to call with further questions or concerns.    New Medication: Patient was educated regarding newly prescribed medication, including discussion of  the indication, administration, side effects, and when to report to MD or RN. Patient demonstrated understanding of this information and agreed to call with further questions or concerns.    Return Appointment: Patient given instructions regarding scheduling next clinic visit. Patient demonstrated understanding of this information and agreed to call with further questions or concerns.    Patient stated she understood all health information given and agreed to call with further questions or concerns.

## 2022-05-06 NOTE — PATIENT INSTRUCTIONS
You were seen today in the Adult Congenital and Cardiovascular Genetics Clinic at the [facility].    Cardiology Providers you saw during your visit:  Casper Valdes MD    Diagnosis:  TOF    Results:  Casper Valdes MD reviewed the results of your EKG, labs, and zio testing today in clinic.    Recommendations:    Continue to eat a heart healthy, low salt diet.  Continue to get 20-30 minutes of aerobic activity, 4-5 days per week.  Examples of aerobic activity include walking, running, swimming, cycling, etc.  Continue to observe good oral hygiene, with regular dental visits.  Start Diltiazem 120 mg CD daily       SBE prophylaxis:   Yes__x__  No____    Lifelong Bacterial Endocarditis Prophylaxis:  YES____  NO____    If YES is checked, follow the recommendations outlined below:  Take antibiotic(s) prior to recommended dental procedures and procedures on the respiratory tract or with infected skin, muscle or bones. SBE prophylaxis is not needed for routine GI and  procedures (ie. Colonoscopy or vaginal delivery)  Observe good oral hygiene daily, as advised by your dentist. Get regular professional dental care.  Keep cuts clean.  Infections should be treated promptly.  Symptoms of Infective Endocarditis could include: fever lasting more than 4-5 days or a recurrent fever that initially resolves but returns within 1-2 days)      Exercise restrictions:   Yes__X__  No____         If yes, list restrictions:  Must be allowed to rest if fatigued or SOB      Work restrictions:  Yes____  No_X___         If yes, list restrictions:    FASTING CHOLESTEROL was checked in the last 5 years YES__x_  NO___ (2022)  Continue to eat a heart healthy, low salt diet.         ____ Fasting lipid panel order today         ____ No changes in medications          ____ I recommend the following changes in your cholesterol medications.:          ____ Please follow up for cholesterol screening at your primary care  physician      Follow-up:  Follow up with Dr. Valdes in June as scheduled on 6/3 with Dr Cheney as well    If you have questions or concerns please contact us at:    AMAIRANI Matta, RN    Sindi Méndez (Scheduling)  Nurse Care Coordinator     Clinic   Adult Congenital and CV Genetics   Adult Congenital and CV Genetic  [facility]   [facility]  (P) [phone]     (P) [phone]         (F) [phone]        For after hours urgent needs, call [phone] and ask to speak to the Adult Congenital Physician on call.  Mention Job Code 0401.    For emergencies call 911.    [facility]   [facility]  Mail Code [id number]  [address] [place]  [address]

## 2022-05-06 NOTE — LETTER
5/6/2022      RE: Brittani Gaviria  [address]  [place] [address]       Dear Colleague,    Thank you for the opportunity to participate in the care of your patient, Brittani Gaviria, at the [facility] [place] [facility]. Please see a copy of my visit note below.        ELECTROPHYSIOLOGY CLINIC VISIT    Assessment/Recommendations   Assessment/Plan:      Ms. Gaviria is a 31 year old female who has a past medical history significant for TOF s/p surgical repair at 11 months, s/p pulmonary valve repair at 7 years, s/p pulmonary valve replacement at age 13 years, and palpitations.     Palpitations:   She began developing palpitations. She had an episode in 11/2021 with palpitations and dizziness and had to sit down. She was evaluated at OSH and ECG and echo were stable and no intervention was done. Her symptoms continued and she was seen again. A zio patch from OSH in 1/2022 reported to show 1 NSVT 6 beats, 45 PSVT up to 7.2 seconds, frequent PACs 14.4%, and rare isolated PVCs. 56 symptom activations reportedly corresponded to PAC/PVCs. She was started on Metoprolol. She had some weird hallucinations and metoprolol was lowered and this subsided. She started having some chest pains and metoprolol was decreased further but symptoms continued. Metoprolol was stopped in 4/5/22 due to this. We will work to get the tracings from the OSH zio patch monitor to evaluate further. We will start her on Diltiazem 120 mg CD daily for her symptoms.       Follow up as scheduled in 6/2022.        History of Present Illness/Subjective    Ms. Brittani Gaviria is a 31 year old female who comes in today for EP consultation of palpitations.    Ms. Gaviria is a 31 year old female who has a past medical history significant for TOF s/p surgical repair at 11 months, s/p pulmonary valve repair at 7 years, s/p pulmonary valve replacement at age 13 years, and  palpitations.     She was born with TOF and underwent repair at 11 months. She required pulmonary valve repair at age 7 and the a pulmonary valve replacement at 13 years of age. Her last CMR at OSH showed normal biventricular function, severe RV dilation, moderate LV dilation, and moderate pulmonary valve regurgitation. She began developing palpitations. She had an episode in 11/2021 with palpitations and dizziness and had to sit down. She was evaluated at OSH and ECG and echo were stable and no intervention was done. Her symptoms continued and she was seen again. A zio patch from OSH in 1/2022 reported to show 1 NSVT 6 beats, 45 PSVT up to 7.2 seconds, frequent PACs 14.4%, and rare isolated PVCs. 56 symptom activations reportedly corresponded to PAC/PVCs. She was started on Metoprolol. She had some weird hallucinations and metoprolol was lowered and this subsided. She started having some chest pains and metoprolol was decreased further but symptoms continued. Metoprolol was stopped in 4/5/22 due to this.     She reports feeling OK. She has noted her COOPER has progressed over last year. She continues to have palpitations many times a day for a minute or less. She denies chest discomfort, palpitations, abdominal fullness/bloating or peripheral edema, shortness of breath, paroxysmal nocturnal dyspnea, orthopnea, lightheadedness, dizziness, pre-syncope, or syncope. Presenting 12 lead ECG shows SR with PACs IVCD Vent Rate 73 bpm,  ms,  ms, QTc 464 ms. No current cardiac medications.       I have reviewed and updated the patient's Past Medical History, Social History, Family History and Medication List.     Cardiographics (Personally Reviewed) :   11/2021 Echo (OSH Report):  CONCLUSIONS   Patient is status post repair of tetralogy of Fallot.   1. Left ventricular ejection fraction is visually estimated at 65%.   Abnormal septal motion consistent with prior open heart surgery.   2. Moderate right ventricular  "dilation is present. Global right   ventricular systolic function is moderately reduced.   3. Mixed pulmonary valve disease. Pulmonic stenosis. Peak velocity   through the pulmonary valve is 2.5 m/sec and a peak gradient 27 mm of   Hg..   Probably severe pulmonic regurgitation.   4. Proximal ascending aorta measures at 3.9 cm.   Compared to the prior study dated June 11, 2019 , no significant   change was noted.     2/10/17 Echo:      CONCLUSIONS  Patient after transannular patch repair for tetralogy of Fallot. There is no  residual ventricular level shunt. There is mild to moderate right ventricular  enlargement. Normal right ventricular systolic function. Normal left  ventricular systolic function with a calculated biplane left ventricular  ejection fraction of 60-65%. Trivial tricuspid valve insufficiency. Estimated  right ventricular systolic pressure is 25-30 mmHg plus right atrial pressure.  There is mild flow acceleration across the pulmonary valve with a peak  gradient across the pulmonary valve of 15-20 mmHg. Moderate to severe (3-4+)  pulmonary valve insufficiency. There is mild dilation of the aortic root at  the level of the sinuses of Valsalva.    10/30/20 CMR (OSH Report):  Impression:   1. Normal left and right ventricular function, EF 55.6 and 46.9% respectively   2. Moderate left ventricular enlargement with severe right ventricular enlargement.   3. Overriding aortic root with mildly dilated sinus of Valsalva at 41.9 mm.   4. Moderate pulmonary regurgitation with a regurgitant volume of 44.2 mL, regurgitation fraction of 32.3%.   5. No residual VSD          Physical Examination   BP (!) 160/96 (BP Location: Right arm, Patient Position: Sitting, Cuff Size: Adult Large)   Pulse 71   Ht 1.682 m (5' 6.22\")   Wt 95.9 kg (211 lb 8 oz)   SpO2 99%   BMI 33.91 kg/m    Wt Readings from Last 3 Encounters:   02/15/21 79.4 kg (175 lb)   02/01/21 79.4 kg (175 lb)   01/18/21 79.4 kg (175 lb)     General " Appearance:   Alert, well-appearing and in no acute distress.   HEENT: Atraumatic, normocephalic. PERRL.  MMM.   Chest/Lungs:   Respirations unlabored.  Lungs are clear to auscultation.   Cardiovascular:   Regular. Murmur present.    Abdomen:  Soft, nontender, nondistended.   Extremities: No cyanosis or clubbing. No edema.    Musculoskeletal: Moves all extremities.  Gait normal.   Skin: Warm, dry, intact.    Neurologic: Mood and affect are appropriate.  Alert and oriented to person, place, time, and situation.          Medications  Allergies   Current Outpatient Medications   Medication Sig Dispense Refill     Cholecalciferol (VITAMIN D) 2000 UNITS tablet Take 2,000 Units by mouth daily 100 tablet 3     diclofenac (VOLTAREN) 75 MG EC tablet Take 1 tablet (75 mg) by mouth 2 times daily 20 tablet 0     methylPREDNISolone (MEDROL DOSEPAK) 4 MG tablet therapy pack Follow Package Directions 21 tablet 0    Allergies   Allergen Reactions     Blood Transfusion Related (Informational Only) Other (See Comments)     Patient has a history of a clinically significant antibody against RBC antigens.  A delay in compatible RBCs may occur.         Lab Results (Personally Reviewed)    Chemistry/lipid CBC Cardiac Enzymes/BNP/TSH/INR   Lab Results   Component Value Date    BUN 7 07/20/2016     07/20/2016    CO2 24 07/20/2016     Creatinine   Date Value Ref Range Status   07/20/2016 0.74 0.52 - 1.04 mg/dL Final       No results found for: CHOL, HDL, LDL, CHOLHDL   Lab Results   Component Value Date    WBC 12.8 (H) 11/30/2016    HGB  11/30/2016     CHANGE ORDER PER MD  CORRECTED ON 12/01 AT 0931: PREVIOUSLY REPORTED AS 8.1      HGB 8.1 (L) 11/30/2016    HCT 26.6 (L) 11/30/2016    MCV 86 11/30/2016     11/30/2016    Lab Results   Component Value Date    TSH 0.94 07/17/2015        The patient states understanding and is agreeable with the plan.   Casper Valdes MD [facility]  Cardiology - Electrophysiology    Total time spent on  patient visit, reviewing notes, imaging, labs, orders, and completing necessary documentation: 45 minutes.

## 2022-05-06 NOTE — NURSING NOTE
Chief Complaint   Patient presents with     New Patient     31 yr old female with PMH significant for TOF and mechanical valve presenting for evaluation.    Vitals were taken, medications reconciled, and EKG was performed.    Jin Valles, EMT  9:31 AM

## 2022-05-09 ENCOUNTER — MYC MEDICAL ADVICE (OUTPATIENT)
Dept: CARDIOLOGY | Facility: CLINIC | Age: 32
End: 2022-05-09
Payer: COMMERCIAL

## 2022-05-09 LAB
ATRIAL RATE - MUSE: 73 BPM
DIASTOLIC BLOOD PRESSURE - MUSE: NORMAL MMHG
INTERPRETATION ECG - MUSE: NORMAL
P AXIS - MUSE: 74 DEGREES
PR INTERVAL - MUSE: 136 MS
QRS DURATION - MUSE: 134 MS
QT - MUSE: 422 MS
QTC - MUSE: 464 MS
R AXIS - MUSE: 91 DEGREES
SYSTOLIC BLOOD PRESSURE - MUSE: NORMAL MMHG
T AXIS - MUSE: 67 DEGREES
VENTRICULAR RATE- MUSE: 73 BPM

## 2022-05-15 ENCOUNTER — HEALTH MAINTENANCE LETTER (OUTPATIENT)
Age: 32
End: 2022-05-15

## 2022-05-20 ENCOUNTER — MYC MEDICAL ADVICE (OUTPATIENT)
Dept: CARDIOLOGY | Facility: CLINIC | Age: 32
End: 2022-05-20
Payer: COMMERCIAL

## 2022-05-20 DIAGNOSIS — Q21.3 TETRALOGY OF FALLOT: Primary | ICD-10-CM

## 2022-05-20 DIAGNOSIS — I10 ESSENTIAL HYPERTENSION: ICD-10-CM

## 2022-05-23 RX ORDER — ADHESIVE BANDAGE 3/4"
BANDAGE TOPICAL
Qty: 1 EACH | Refills: 0 | Status: CANCELLED | OUTPATIENT
Start: 2022-05-23

## 2022-05-24 RX ORDER — ADHESIVE BANDAGE 3/4"
BANDAGE TOPICAL
Qty: 1 EACH | Refills: 0 | Status: SHIPPED | OUTPATIENT
Start: 2022-05-24

## 2022-05-25 ASSESSMENT — ENCOUNTER SYMPTOMS
LOSS OF CONSCIOUSNESS: 0
WEIGHT LOSS: 0
CHILLS: 0
SPEECH CHANGE: 0
SYNCOPE: 0
SINUS CONGESTION: 1
LIGHT-HEADEDNESS: 1
STIFFNESS: 0
HEARTBURN: 1
BLOATING: 0
WEIGHT GAIN: 0
DIZZINESS: 1
LEG PAIN: 0
NAUSEA: 0
ARTHRALGIAS: 0
DEPRESSION: 0
WEIGHT LOSS: 0
LOSS OF CONSCIOUSNESS: 0
SYNCOPE: 0
VOMITING: 0
HOARSE VOICE: 0
BLOOD IN STOOL: 0
MEMORY LOSS: 0
MUSCLE CRAMPS: 1
INSOMNIA: 1
HYPERTENSION: 1
SEIZURES: 0
HALLUCINATIONS: 1
SORE THROAT: 0
SORE THROAT: 0
BLOATING: 0
BLOOD IN STOOL: 0
HYPOTENSION: 0
NECK MASS: 0
MYALGIAS: 1
BACK PAIN: 1
ORTHOPNEA: 1
NUMBNESS: 0
FATIGUE: 1
DIZZINESS: 1
HALLUCINATIONS: 1
INCREASED ENERGY: 0
POLYPHAGIA: 0
DECREASED APPETITE: 0
BACK PAIN: 1
FEVER: 0
TROUBLE SWALLOWING: 0
ALTERED TEMPERATURE REGULATION: 0
NUMBNESS: 0
JOINT SWELLING: 0
SINUS CONGESTION: 1
PALPITATIONS: 1
FATIGUE: 1
ARTHRALGIAS: 0
TASTE DISTURBANCE: 0
SLEEP DISTURBANCES DUE TO BREATHING: 1
NAUSEA: 0
TROUBLE SWALLOWING: 0
PARALYSIS: 0
TREMORS: 0
DIARRHEA: 0
ABDOMINAL PAIN: 0
TREMORS: 0
DIARRHEA: 0
MUSCLE CRAMPS: 1
SPEECH CHANGE: 0
DISTURBANCES IN COORDINATION: 0
SINUS PAIN: 1
HEADACHES: 1
LEG PAIN: 0
HYPERTENSION: 1
CHILLS: 0
PALPITATIONS: 1
TINGLING: 1
RECTAL PAIN: 0
WEAKNESS: 0
SLEEP DISTURBANCES DUE TO BREATHING: 1
HYPOTENSION: 0
BOWEL INCONTINENCE: 0
DEPRESSION: 0
NECK MASS: 0
JAUNDICE: 0
MUSCLE WEAKNESS: 0
SMELL DISTURBANCE: 0
ABDOMINAL PAIN: 0
BOWEL INCONTINENCE: 0
NIGHT SWEATS: 0
PANIC: 0
MYALGIAS: 1
MEMORY LOSS: 0
STIFFNESS: 0
DISTURBANCES IN COORDINATION: 0
DECREASED APPETITE: 0
SEIZURES: 0
PANIC: 0
POLYDIPSIA: 0
PARALYSIS: 0
ALTERED TEMPERATURE REGULATION: 0
WEIGHT GAIN: 0
NECK PAIN: 0
POLYDIPSIA: 0
HEARTBURN: 1
SMELL DISTURBANCE: 0
TINGLING: 1
JAUNDICE: 0
DECREASED CONCENTRATION: 0
HEADACHES: 1
TASTE DISTURBANCE: 0
POLYPHAGIA: 0
NERVOUS/ANXIOUS: 0
EXERCISE INTOLERANCE: 0
EXERCISE INTOLERANCE: 0
MUSCLE WEAKNESS: 0
JOINT SWELLING: 0
WEAKNESS: 0
INSOMNIA: 1
FEVER: 0
RECTAL PAIN: 0
NECK PAIN: 0
ORTHOPNEA: 1
DECREASED CONCENTRATION: 0
INCREASED ENERGY: 0
CONSTIPATION: 0
SINUS PAIN: 1
LIGHT-HEADEDNESS: 1
CONSTIPATION: 0
NERVOUS/ANXIOUS: 0
HOARSE VOICE: 0
VOMITING: 0
NIGHT SWEATS: 0

## 2022-05-26 ENCOUNTER — HOSPITAL ENCOUNTER (OUTPATIENT)
Dept: MRI IMAGING | Facility: CLINIC | Age: 32
Discharge: HOME | End: 2022-05-26
Attending: INTERNAL MEDICINE
Payer: COMMERCIAL

## 2022-05-26 DIAGNOSIS — I51.7 ENLARGED RV (RIGHT VENTRICLE): ICD-10-CM

## 2022-05-26 DIAGNOSIS — I37.1 PULMONARY REGURGITATION: ICD-10-CM

## 2022-05-26 DIAGNOSIS — Q21.3 TETRALOGY OF FALLOT: ICD-10-CM

## 2022-05-26 PROCEDURE — 75565 CARD MRI VELOC FLOW MAPPING: CPT | Mod: 26 | Performed by: INTERNAL MEDICINE

## 2022-05-26 PROCEDURE — 75561 CARDIAC MRI FOR MORPH W/DYE: CPT | Mod: 26 | Performed by: INTERNAL MEDICINE

## 2022-05-26 PROCEDURE — 255N000002 HC RX 255 OP 636: Performed by: INTERNAL MEDICINE

## 2022-05-26 PROCEDURE — 71555 MRI ANGIO CHEST W OR W/O DYE: CPT

## 2022-05-26 PROCEDURE — A9585 GADOBUTROL INJECTION: HCPCS | Performed by: INTERNAL MEDICINE

## 2022-05-26 PROCEDURE — 71555 MRI ANGIO CHEST W OR W/O DYE: CPT | Mod: 26 | Performed by: INTERNAL MEDICINE

## 2022-05-26 PROCEDURE — 75561 CARDIAC MRI FOR MORPH W/DYE: CPT

## 2022-05-26 RX ORDER — GADOBUTROL 604.72 MG/ML
7.5 INJECTION INTRAVENOUS ONCE
Status: COMPLETED | OUTPATIENT
Start: 2022-05-26 | End: 2022-05-26

## 2022-05-26 RX ADMIN — GADOBUTROL 7.5 ML: 604.72 INJECTION INTRAVENOUS at 15:18

## 2022-05-26 RX ADMIN — GADOBUTROL 5.5 ML: 604.72 INJECTION INTRAVENOUS at 15:18

## 2022-06-03 ENCOUNTER — OFFICE VISIT (OUTPATIENT)
Dept: CARDIOLOGY | Facility: CLINIC | Age: 32
End: 2022-06-03
Attending: INTERNAL MEDICINE
Payer: COMMERCIAL

## 2022-06-03 VITALS
OXYGEN SATURATION: 98 % | DIASTOLIC BLOOD PRESSURE: 93 MMHG | WEIGHT: 214.1 LBS | HEART RATE: 83 BPM | SYSTOLIC BLOOD PRESSURE: 149 MMHG | HEIGHT: 66 IN | BODY MASS INDEX: 34.41 KG/M2

## 2022-06-03 VITALS
OXYGEN SATURATION: 98 % | DIASTOLIC BLOOD PRESSURE: 93 MMHG | HEART RATE: 83 BPM | WEIGHT: 214.1 LBS | BODY MASS INDEX: 34.41 KG/M2 | SYSTOLIC BLOOD PRESSURE: 149 MMHG | HEIGHT: 66 IN

## 2022-06-03 DIAGNOSIS — I10 ESSENTIAL HYPERTENSION: ICD-10-CM

## 2022-06-03 DIAGNOSIS — Q21.3 TOF (TETRALOGY OF FALLOT): ICD-10-CM

## 2022-06-03 DIAGNOSIS — Q21.3 TETRALOGY OF FALLOT: ICD-10-CM

## 2022-06-03 DIAGNOSIS — I51.7 ENLARGED RV (RIGHT VENTRICLE): ICD-10-CM

## 2022-06-03 DIAGNOSIS — I37.1 PULMONARY VALVE INSUFFICIENCY, UNSPECIFIED ETIOLOGY: ICD-10-CM

## 2022-06-03 DIAGNOSIS — I37.1 NONRHEUMATIC PULMONARY VALVE INSUFFICIENCY: ICD-10-CM

## 2022-06-03 DIAGNOSIS — Z13.6 CARDIOVASCULAR SCREENING; LDL GOAL LESS THAN 160: Primary | ICD-10-CM

## 2022-06-03 PROCEDURE — 99214 OFFICE O/P EST MOD 30 MIN: CPT | Mod: 25 | Performed by: INTERNAL MEDICINE

## 2022-06-03 PROCEDURE — G0463 HOSPITAL OUTPT CLINIC VISIT: HCPCS | Mod: 27

## 2022-06-03 PROCEDURE — G0463 HOSPITAL OUTPT CLINIC VISIT: HCPCS

## 2022-06-03 RX ORDER — DILTIAZEM HYDROCHLORIDE 120 MG/1
240 CAPSULE, COATED, EXTENDED RELEASE ORAL DAILY
Qty: 180 CAPSULE | Refills: 3 | Status: SHIPPED | OUTPATIENT
Start: 2022-06-03 | End: 2022-08-16

## 2022-06-03 ASSESSMENT — PAIN SCALES - GENERAL
PAINLEVEL: NO PAIN (0)
PAINLEVEL: NO PAIN (0)

## 2022-06-03 NOTE — PROGRESS NOTES
"HPI: Brittani Gaviria is a 31 year old female with history of surgically repaired tetralogy of fallot who presents for ongoing evaluation and management.  Pt reports that overall she has been feeling well.  She reports that since being on the diltiazem her edema has resolved.  She also notes that palpitations have also improved with the diltiazem but still has some residual palpitations.  She denies any chest pain or pressure, sob/torrez, orthopnea, pnd, syncope/presyncope or significant change in exercise tolerance.      Cardiac History: Pt reports that she was born in [place] at [facility] and was delivered via .  She was diagnosed with TOF shortly after birth but :had to wait to grow\" before intervention.  She reports that she underwent complete surgical repair at 11 months of age.  She then underwent a \"valve repair\" at age age and then \"valve replacement\" at 13 years of age.  The first two procedures were performed at [facility] which is now closed. The last was performed at [facility] in [place], [place].  She has never been anticoagulated.  She does not recall any significant cardiac limitations growing up.  She may have not a little less energy/exercise tolerance but this was mild.  She denies having any special limitations or restriction.  The patient followed up at [facility] until she was 17 then at [facility] in [place] (age 17 - 20) and [place], [place].  Her first pregnancy at age 20 was uncomplicated.  The second pregnancy at age 21 was also uncomplicated.  The third pregnancy at age 24 resulted in miscarriage at 13 weeks.  Fourth pregnancy delivered at [facility] via planned  due to prior .      PAST MEDICAL HISTORY:  Past Medical History:   Diagnosis Date     Carpal tunnel syndrome     right, conservative management      Chlamydia      Cholelithiasis     resolved     Hyperparathyroidism (H) 2016     Iron deficiency anemia "      Kidney stones      Migraines      Other and unspecified ovarian cyst      Rh sensitization 2016     Tetralogy of Fallot     surgical correction         CURRENT MEDICATIONS:  Current Outpatient Medications   Medication Sig Dispense Refill     acetaminophen (TYLENOL) 325 MG tablet Take 325-650 mg by mouth       azelastine (ASTELIN) 0.1 % nasal spray        benzoyl peroxide 5 % external liquid        Blood Pressure Monitoring (BLOOD PRESSURE CUFF) MISC Please check blood pressures as needed. 1 each 0     Cholecalciferol (VITAMIN D) 2000 UNITS tablet Take 2,000 Units by mouth daily (Patient not taking: Reported on 6/3/2022) 100 tablet 3     clindamycin (CLEOCIN T) 1 % external solution        cyclobenzaprine (FLEXERIL) 5 MG tablet Take 5-10 mg by mouth       diltiazem ER COATED BEADS (CARDIZEM CD) 120 MG 24 hr capsule Take 1 capsule (120 mg) by mouth daily 90 capsule 3     levonorgestrel (MIRENA) 20 MCG/DAY IUD 1 each by Intrauterine route       omeprazole (PRILOSEC) 20 MG DR capsule Take 20 mg by mouth       RETIN-A 0.025 % external cream        SUMAtriptan (IMITREX) 6 MG/0.5ML injection Inject 6 mg under the skin at onset of migraine.  May repeat in 1-2 hours, if needed.  No more than 2 per day or 9 days per month.         PAST SURGICAL HISTORY:  Past Surgical History:   Procedure Laterality Date     CARDIAC SURGERY      x 3 @ ages , 11 months, 13 years      SECTION      x 2     CHOLECYSTECTOMY, LAPOROSCOPIC      Cholecystectomy, Laparoscopic       ALLERGIES  Blood transfusion related (informational only), Ketorolac, and Ferumoxytol    FAMILY HX:  Family History   Problem Relation Age of Onset     Diabetes Maternal Grandmother      Hypertension Father      Hypertension Sister      Multiple Sclerosis Mother      Cerebrovascular Disease Mother      Coronary Artery Disease No family hx of      Depression No family hx of      Anxiety Disorder No family hx of      Anesthesia Reaction No  "family hx of      Cancer No family hx of      Thyroid Disease No family hx of      Glaucoma No family hx of      Macular Degeneration No family hx of        SOCIAL HX:  Social History     Social History     Marital Status:      Spouse Name: Pascual     Number of Children: 4     Years of Education: N/A     Occupational History     pharmacy tech      [employer]     Social History Main Topics     Smoking status: Never Smoker      Smokeless tobacco: Never Used     Alcohol Use: No     Drug Use: No     Sexual Activity:     Partners: Male     Birth Control/ Protection: BTL     Other Topics Concern     None     Social History Narrative    From [place] and [place].            VITAL SIGNS:  BP (!) 149/93 (BP Location: Left arm, Patient Position: Chair, Cuff Size: Adult Large)   Pulse 83   Ht 1.688 m (5' 6.46\")   Wt 97.1 kg (214 lb 1.6 oz)   SpO2 98%   BMI 34.08 kg/m    Body mass index is 34.08 kg/m .  Wt Readings from Last 2 Encounters:   22 97.1 kg (214 lb 1.6 oz)   22 97.1 kg (214 lb 1.6 oz)   General: appears comfortable, alert and articulate  Neck: no adenopathy, 2+ carotids without bruits  Heart: Normal S1 and widened splitting of S2.  Early short diastolic murmur and soft systolic flow murmur at LUSB. No rub, or gallop.    Lungs: clear, no rales or wheezing  Abdomen: soft, non-tender, bowel sounds present. Gravid.  Extremities: no clubbing, cyanosis or edema.  Neurological: normal speech and affect, no gross motor deficits       LABS      EK/15/16  NSR.  RBBB.  0.13/0.14/0.419  RBBB    ECHO (16):   H/O TOF repair.  Global and regional left ventricular function is normal with an EF of 55-60%.  Mild right ventricular dilation is present.  Global right ventricular function is normal.  Moderate pulmonic insufficiency is present.  Mean pulmonary gradient 14 mmHg.  Ascending aorta 3.7 cm.  Sinuses of Valsalva 3.7 cm.    (10/10/16):   Ziopatch showed rare PAC and rare PVC.  No significant / complex " arrhythmias.    Echo 2/10/17  Patient after transannular patch repair for tetralogy of Fallot. There is no  residual ventricular level shunt. There is mild to moderate right ventricular  enlargement. Normal right ventricular systolic function. Normal left ventricular systolic function with a calculated biplane left ventricular ejection fraction of 60-65%. Trivial tricuspid valve insufficiency. Estimated right ventricular systolic pressure is 25-30 mmHg plus right atrial pressure. There is mild flow acceleration across the pulmonary valve with a peak gradient across the pulmonary valve of 15-20 mmHg. Moderate to severe (3-4+) pulmonary valve insufficiency. There is mild dilation of the aortic root at the level of the sinuses of Valsalva.    CMR Report 5-  Clinical history: 31 F Tetralogy of Fallot repair, severe PI  Comparison CMR: 2020 done at [facility]  1. The LV is normal in cavity size and wall thickness. The global systolic function is normal. The LVEF is 60%. There are no regional wall motion abnormalities. There is no residual VSD  2. The RV is moderately dilated in cavity size. The global systolic function is normal. The RVEF is 53%.   3. Both atria are normal in size. There is no ASD.   4. There is moderate-severe PI (RV 45 ml, RF 40%) and mild PS (peak gr 16 mmHg).   5. Late gadolinium enhancement imaging shows no MI, fibrosis or infiltrative disease.   6. There is no pericardial effusion or thickening.  7. There is no intracardiac thrombus.  MRA Aorta  1. The aortic root is overriding and mildly dilated. The ascending aorta, transverse arch and descending thoracic aorta are normal in size without an aneurysm or dissection.  2. The aortic arch is left sided. There is normal branching of the arch vessels. There is no coarctation.  3. The main PA is mildly narrowed. The proximal branch pulmonary arteries are normal in size.   4. The systemic venous connections are normal.   5. Normal pulmonary  venous anatomy with all pulmonary veins draining to the left atrium.  CONCLUSIONS: Repaired Tetralogy of Fallot, moderate-severe PI (RV 45 ml, RF 40%) and mild PS (peak gr 16mmHg), moderately dilated RV with normal function (RV EDVI 120 ml/m2, RVEF 53%). Mildly dilated aortic root measuring 4.1 cm. No significant change compared to study from 2020.          ASSESSMENT AND PLAN:  31 year old female with history of surgically repaired tetralogy of fallot presents for initial evaluation in the adult congenital clinic ongoing evaluation and management    1. Surgically corrected Tetralogy of Fallot (TOF).   Pt remains euvolemic today by history and exam.  Pt's cardiac MRI confirms RVEDVI remains < 150 ml/m2, measuring 120 ml/m2 today with stable normal RVEF as well as normal LV size and function and stable mildly dilated aortic root at 4.1cm.  BP is elevated today.  Will have patient increase diltiazem to 240mg daily.  Instructed patient to continue to monitor BP and call clinic if BP remains > 135/85.  Will plan to repeat CPX in 3-4 months once BP control optimized.  Encouraged patient to begin regular aerobic exercise aiming for at least 150 minutes of moderate physical activity or 75 minutes of vigorous physical activity - or an equal combination of both - each week. and follow low-salt, heart healthy diet.   Reminded patient of need to maintain good oral hygiene and continue regular dental care but by recent AHA/ACC guidelines with SBE prophylaxis. Of note, patient is not planning on any further pregnancies and has undergone BTL.     2.  Palpitations:  Seen by Dr. Casper Valdes, congenital EP.  Please see his note for detailed findings and plan.      FOLLOW UP:  One year with echo, ekg and labs.  Will be happy to see sooner if change in clinical status or new questions/concerns arise.      Jocelyn Cheney MD  Section Head - Advanced Heart Failure, Transplantation and Mechanical Circulatory Support  Director - Adult  Congenital and Cardiovascular Genetics Center  Associate Professor of Medicine, [facility]    I spent a total of 30 minutes today with the patient personally reviewing recent cardiac testing and/or laboratory results, today's history and examination, and discussion and counseling with the patient.

## 2022-06-03 NOTE — LETTER
6/3/2022      RE: Brittani Gaviria  [address]  [place] [address]       Dear Colleague,    Thank you for the opportunity to participate in the care of your patient, Brittani Gaviria, at the [facility] [place] [facility]. Please see a copy of my visit note below.        [facility] ELECTROPHYSIOLOGY CLINIC VISIT    Assessment/Recommendations   Assessment/Plan:      Ms. Gaviria is a 31 year old female who has a past medical history significant for TOF s/p surgical repair at 11 months, s/p pulmonary valve repair at 7 years, s/p pulmonary valve replacement at age 13 years, and palpitations.     Palpitations:   She began developing palpitations. She had an episode in 11/2021 with palpitations and dizziness and had to sit down. She was evaluated at OSH and ECG and echo were stable and no intervention was done. Her symptoms continued and she was seen again. A zio patch from OSH in 1/2022 reported to show 1 NSVT 6 beats, 45 PSVT up to 7.2 seconds, frequent PACs 14.4%, and rare isolated PVCs. 56 symptom activations reportedly corresponded to PAC/PVCs. She was started on Metoprolol. She had some weird hallucinations and metoprolol was lowered and this subsided. She started having some chest pains and metoprolol was decreased further but symptoms continued. Metoprolol was stopped in 4/5/22 due to this. We started her on Diltiazem and she is tolerating this well. This has improved her symptoms. No need to increase from EP standpoint, but increasing to Diltiazem 240 mg CD daily for better BP control.     Follow up in 1 year.           History of Present Illness/Subjective    Ms. Brittani Gaviria is a 31 year old female who comes in today for EP consultation of palpitations.    Ms. Gaviria is a 31 year old female who has a past medical history significant for TOF s/p surgical repair at 11 months, s/p pulmonary valve repair at 7 years, s/p pulmonary  valve replacement at age 13 years, and palpitations.     She was born with TOF and underwent repair at 11 months. She required pulmonary valve repair at age 7 and the a pulmonary valve replacement at 13 years of age. Her last CMR at OSH showed normal biventricular function, severe RV dilation, moderate LV dilation, and moderate pulmonary valve regurgitation. She began developing palpitations. She had an episode in 11/2021 with palpitations and dizziness and had to sit down. She was evaluated at OSH and ECG and echo were stable and no intervention was done. Her symptoms continued and she was seen again. A zio patch from OSH in 1/2022 reported to show 1 NSVT 6 beats, 45 PSVT up to 7.2 seconds, frequent PACs 14.4%, and rare isolated PVCs. 56 symptom activations reportedly corresponded to PAC/PVCs. She was started on Metoprolol. She had some weird hallucinations and metoprolol was lowered and this subsided. She started having some chest pains and metoprolol was decreased further but symptoms continued. Metoprolol was stopped in 4/5/22 due to this.     EP Visit 5/6/22: She reports feeling OK. She has noted her COOPER has progressed over last year. She continues to have palpitations many times a day for a minute or less. She denies chest discomfort, palpitations, abdominal fullness/bloating or peripheral edema, shortness of breath, paroxysmal nocturnal dyspnea, orthopnea, lightheadedness, dizziness, pre-syncope, or syncope. Presenting 12 lead ECG shows SR with PACs IVCD Vent Rate 73 bpm,  ms,  ms, QTc 464 ms. No current cardiac medications. She was started on Diltiazem at this visit.     She presents today for follow up. She reports feeling improved symptoms control with diltiazem. She still has some residual palpitations but much improved. She denies chest discomfort, abdominal fullness/bloating or peripheral edema, shortness of breath, paroxysmal nocturnal dyspnea, orthopnea, lightheadedness, dizziness,  pre-syncope, or syncope. Current cardiac medication: Diltiazem.       I have reviewed and updated the patient's Past Medical History, Social History, Family History and Medication List.     Cardiographics (Personally Reviewed) :   11/2021 Echo (OS Report):  CONCLUSIONS   Patient is status post repair of tetralogy of Fallot.   1. Left ventricular ejection fraction is visually estimated at 65%.   Abnormal septal motion consistent with prior open heart surgery.   2. Moderate right ventricular dilation is present. Global right   ventricular systolic function is moderately reduced.   3. Mixed pulmonary valve disease. Pulmonic stenosis. Peak velocity   through the pulmonary valve is 2.5 m/sec and a peak gradient 27 mm of   Hg..   Probably severe pulmonic regurgitation.   4. Proximal ascending aorta measures at 3.9 cm.   Compared to the prior study dated June 11, 2019 , no significant   change was noted.     2/10/17 Echo:      CONCLUSIONS  Patient after transannular patch repair for tetralogy of Fallot. There is no  residual ventricular level shunt. There is mild to moderate right ventricular  enlargement. Normal right ventricular systolic function. Normal left  ventricular systolic function with a calculated biplane left ventricular  ejection fraction of 60-65%. Trivial tricuspid valve insufficiency. Estimated  right ventricular systolic pressure is 25-30 mmHg plus right atrial pressure.  There is mild flow acceleration across the pulmonary valve with a peak  gradient across the pulmonary valve of 15-20 mmHg. Moderate to severe (3-4+)  pulmonary valve insufficiency. There is mild dilation of the aortic root at  the level of the sinuses of Valsalva.    10/30/20 CMR (OS Report):  Impression:   1. Normal left and right ventricular function, EF 55.6 and 46.9% respectively   2. Moderate left ventricular enlargement with severe right ventricular enlargement.   3. Overriding aortic root with mildly dilated sinus of Valsalva at  "41.9 mm.   4. Moderate pulmonary regurgitation with a regurgitant volume of 44.2 mL, regurgitation fraction of 32.3%.   5. No residual VSD          Physical Examination   BP (!) 149/93 (BP Location: Left arm, Patient Position: Chair, Cuff Size: Adult Large)   Pulse 83   Ht 1.688 m (5' 6.46\")   Wt 97.1 kg (214 lb 1.6 oz)   SpO2 98%   BMI 34.08 kg/m    Wt Readings from Last 3 Encounters:   05/06/22 95.9 kg (211 lb 8 oz)   02/15/21 79.4 kg (175 lb)   02/01/21 79.4 kg (175 lb)     General Appearance:   Alert, well-appearing and in no acute distress.   HEENT: Atraumatic, normocephalic. PERRL.  MMM.   Chest/Lungs:   Respirations unlabored.  Lungs are clear to auscultation.   Cardiovascular:   Regular. Murmur present.    Abdomen:  Soft, nontender, nondistended.   Extremities: No cyanosis or clubbing. No edema.    Musculoskeletal: Moves all extremities.  Gait normal.   Skin: Warm, dry, intact.    Neurologic: Mood and affect are appropriate.  Alert and oriented to person, place, time, and situation.          Medications  Allergies   Current Outpatient Medications   Medication Sig Dispense Refill     acetaminophen (TYLENOL) 325 MG tablet Take 325-650 mg by mouth       azelastine (ASTELIN) 0.1 % nasal spray        benzoyl peroxide 5 % external liquid        Blood Pressure Monitoring (BLOOD PRESSURE CUFF) MISC Please check blood pressures as needed. 1 each 0     Cholecalciferol (VITAMIN D) 2000 UNITS tablet Take 2,000 Units by mouth daily 100 tablet 3     clindamycin (CLEOCIN T) 1 % external solution        cyclobenzaprine (FLEXERIL) 5 MG tablet Take 5-10 mg by mouth       diltiazem ER COATED BEADS (CARDIZEM CD) 120 MG 24 hr capsule Take 1 capsule (120 mg) by mouth daily 90 capsule 3     levonorgestrel (MIRENA) 20 MCG/DAY IUD 1 each by Intrauterine route       omeprazole (PRILOSEC) 20 MG DR capsule Take 20 mg by mouth       RETIN-A 0.025 % external cream        SUMAtriptan (IMITREX) 6 MG/0.5ML injection Inject 6 mg under " the skin at onset of migraine.  May repeat in 1-2 hours, if needed.  No more than 2 per day or 9 days per month.      Allergies   Allergen Reactions     Blood Transfusion Related (Informational Only) Other (See Comments)     Patient has a history of a clinically significant antibody against RBC antigens.  A delay in compatible RBCs may occur.     Ketorolac Hives, Itching and Unknown     Tolerates ibuprofen without reaction       Ferumoxytol Difficulty breathing, Cramps, Hives, Itching and Muscle Pain (Myalgia)         Lab Results (Personally Reviewed)    Chemistry/lipid CBC Cardiac Enzymes/BNP/TSH/INR   Lab Results   Component Value Date    BUN 7 07/20/2016     07/20/2016    CO2 24 07/20/2016     Creatinine   Date Value Ref Range Status   07/20/2016 0.74 0.52 - 1.04 mg/dL Final       Lab Results   Component Value Date    CHOL 171 05/06/2022    HDL 48 (L) 05/06/2022     (H) 05/06/2022      Lab Results   Component Value Date    WBC 12.8 (H) 11/30/2016    HGB  11/30/2016     CHANGE ORDER PER MD  CORRECTED ON 12/01 AT 0931: PREVIOUSLY REPORTED AS 8.1      HGB 8.1 (L) 11/30/2016    HCT 26.6 (L) 11/30/2016    MCV 86 11/30/2016     11/30/2016    Lab Results   Component Value Date    TSH 1.27 05/06/2022        The patient states understanding and is agreeable with the plan.   Casper Valdes MD [facility]  Cardiology - Electrophysiology    Total time spent on patient visit, reviewing notes, imaging, labs, orders, and completing necessary documentation: 30 minutes.  >50% of visit spent on counseling patient and/or coordination of care.

## 2022-06-03 NOTE — LETTER
"6/3/2022      RE: Brittani Gaviria  [address]  [place] [address]       Dear Colleague,    Thank you for the opportunity to participate in the care of your patient, Brittani Gaviria, at the [facility] [place] [facility]. Please see a copy of my visit note below.    HPI: Brittani Gaviria is a 31 year old female with history of surgically repaired tetralogy of fallot who presents for ongoing evaluation and management.  Pt reports that overall she has been feeling well.  She reports that since being on the diltiazem her edema has resolved.  She also notes that palpitations have also improved with the diltiazem but still has some residual palpitations.  She denies any chest pain or pressure, sob/torrez, orthopnea, pnd, syncope/presyncope or significant change in exercise tolerance.      Cardiac History: Pt reports that she was born in [place] at [facility] and was delivered via .  She was diagnosed with TOF shortly after birth but :had to wait to grow\" before intervention.  She reports that she underwent complete surgical repair at 11 months of age.  She then underwent a \"valve repair\" at age age and then \"valve replacement\" at 13 years of age.  The first two procedures were performed at [facility] which is now closed. The last was performed at [facility], [place] in [place], [place].  She has never been anticoagulated.  She does not recall any significant cardiac limitations growing up.  She may have not a little less energy/exercise tolerance but this was mild.  She denies having any special limitations or restriction.  The patient followed up at [facility] until she was 17 then at [facility] in [place] (age 17 - 21) and [place], [place].  Her first pregnancy at age 20 was uncomplicated.  The second pregnancy at age 21 was also uncomplicated.  The third pregnancy at age 24 resulted in miscarriage at 13 " weeks.  Fourth pregnancy delivered at [facility] via planned  due to prior .      PAST MEDICAL HISTORY:  Past Medical History:   Diagnosis Date     Carpal tunnel syndrome     right, conservative management      Chlamydia      Cholelithiasis     resolved     Hyperparathyroidism (H) 2016     Iron deficiency anemia 2015     Kidney stones      Migraines      Other and unspecified ovarian cyst      Rh sensitization 2016     Tetralogy of Fallot     surgical correction         CURRENT MEDICATIONS:  Current Outpatient Medications   Medication Sig Dispense Refill     acetaminophen (TYLENOL) 325 MG tablet Take 325-650 mg by mouth       azelastine (ASTELIN) 0.1 % nasal spray        benzoyl peroxide 5 % external liquid        Blood Pressure Monitoring (BLOOD PRESSURE CUFF) MISC Please check blood pressures as needed. 1 each 0     Cholecalciferol (VITAMIN D) 2000 UNITS tablet Take 2,000 Units by mouth daily (Patient not taking: Reported on 6/3/2022) 100 tablet 3     clindamycin (CLEOCIN T) 1 % external solution        cyclobenzaprine (FLEXERIL) 5 MG tablet Take 5-10 mg by mouth       diltiazem ER COATED BEADS (CARDIZEM CD) 120 MG 24 hr capsule Take 1 capsule (120 mg) by mouth daily 90 capsule 3     levonorgestrel (MIRENA) 20 MCG/DAY IUD 1 each by Intrauterine route       omeprazole (PRILOSEC) 20 MG DR capsule Take 20 mg by mouth       RETIN-A 0.025 % external cream        SUMAtriptan (IMITREX) 6 MG/0.5ML injection Inject 6 mg under the skin at onset of migraine.  May repeat in 1-2 hours, if needed.  No more than 2 per day or 9 days per month.         PAST SURGICAL HISTORY:  Past Surgical History:   Procedure Laterality Date     CARDIAC SURGERY      x 3 @ ages , 11 months, 13 years      SECTION      x 2     CHOLECYSTECTOMY, LAPOROSCOPIC  2014    Cholecystectomy, Laparoscopic       ALLERGIES  Blood transfusion related (informational only), Ketorolac, and Ferumoxytol    FAMILY  "HX:  Family History   Problem Relation Age of Onset     Diabetes Maternal Grandmother      Hypertension Father      Hypertension Sister      Multiple Sclerosis Mother      Cerebrovascular Disease Mother      Coronary Artery Disease No family hx of      Depression No family hx of      Anxiety Disorder No family hx of      Anesthesia Reaction No family hx of      Cancer No family hx of      Thyroid Disease No family hx of      Glaucoma No family hx of      Macular Degeneration No family hx of        SOCIAL HX:  Social History     Social History     Marital Status:      Spouse Name: Pascual     Number of Children: 4     Years of Education: N/A     Occupational History     pharmacy tech      [employer]     Social History Main Topics     Smoking status: Never Smoker      Smokeless tobacco: Never Used     Alcohol Use: No     Drug Use: No     Sexual Activity:     Partners: Male     Birth Control/ Protection: BTL     Other Topics Concern     None     Social History Narrative    From [place] and [place].            VITAL SIGNS:  BP (!) 149/93 (BP Location: Left arm, Patient Position: Chair, Cuff Size: Adult Large)   Pulse 83   Ht 1.688 m (5' 6.46\")   Wt 97.1 kg (214 lb 1.6 oz)   SpO2 98%   BMI 34.08 kg/m    Body mass index is 34.08 kg/m .  Wt Readings from Last 2 Encounters:   22 97.1 kg (214 lb 1.6 oz)   22 97.1 kg (214 lb 1.6 oz)   General: appears comfortable, alert and articulate  Neck: no adenopathy, 2+ carotids without bruits  Heart: Normal S1 and widened splitting of S2.  Early short diastolic murmur and soft systolic flow murmur at LUSB. No rub, or gallop.    Lungs: clear, no rales or wheezing  Abdomen: soft, non-tender, bowel sounds present. Gravid.  Extremities: no clubbing, cyanosis or edema.  Neurological: normal speech and affect, no gross motor deficits       LABS      EK/15/16  NSR.  RBBB.  0.13/0.14/0.419  RBBB    ECHO (16):   H/O TOF repair.  Global and regional left ventricular " function is normal with an EF of 55-60%.  Mild right ventricular dilation is present.  Global right ventricular function is normal.  Moderate pulmonic insufficiency is present.  Mean pulmonary gradient 14 mmHg.  Ascending aorta 3.7 cm.  Sinuses of Valsalva 3.7 cm.    (10/10/16):   Ziopatch showed rare PAC and rare PVC.  No significant / complex arrhythmias.    Echo 2/10/17  Patient after transannular patch repair for tetralogy of Fallot. There is no  residual ventricular level shunt. There is mild to moderate right ventricular  enlargement. Normal right ventricular systolic function. Normal left ventricular systolic function with a calculated biplane left ventricular ejection fraction of 60-65%. Trivial tricuspid valve insufficiency. Estimated right ventricular systolic pressure is 25-30 mmHg plus right atrial pressure. There is mild flow acceleration across the pulmonary valve with a peak gradient across the pulmonary valve of 15-20 mmHg. Moderate to severe (3-4+) pulmonary valve insufficiency. There is mild dilation of the aortic root at the level of the sinuses of Valsalva.    CMR Report 5-  Clinical history: 31 F Tetralogy of Fallot repair, severe PI  Comparison CMR: 2020 done at [facility]  1. The LV is normal in cavity size and wall thickness. The global systolic function is normal. The LVEF is 60%. There are no regional wall motion abnormalities. There is no residual VSD  2. The RV is moderately dilated in cavity size. The global systolic function is normal. The RVEF is 53%.   3. Both atria are normal in size. There is no ASD.   4. There is moderate-severe PI (RV 45 ml, RF 40%) and mild PS (peak gr 16 mmHg).   5. Late gadolinium enhancement imaging shows no MI, fibrosis or infiltrative disease.   6. There is no pericardial effusion or thickening.  7. There is no intracardiac thrombus.  MRA Aorta  1. The aortic root is overriding and mildly dilated. The ascending aorta, transverse arch and  descending thoracic aorta are normal in size without an aneurysm or dissection.  2. The aortic arch is left sided. There is normal branching of the arch vessels. There is no coarctation.  3. The main PA is mildly narrowed. The proximal branch pulmonary arteries are normal in size.   4. The systemic venous connections are normal.   5. Normal pulmonary venous anatomy with all pulmonary veins draining to the left atrium.  CONCLUSIONS: Repaired Tetralogy of Fallot, moderate-severe PI (RV 45 ml, RF 40%) and mild PS (peak gr 16mmHg), moderately dilated RV with normal function (RV EDVI 120 ml/m2, RVEF 53%). Mildly dilated aortic root measuring 4.1 cm. No significant change compared to study from 2020.          ASSESSMENT AND PLAN:  31 year old female with history of surgically repaired tetralogy of fallot presents for initial evaluation in the adult congenital clinic ongoing evaluation and management    1. Surgically corrected Tetralogy of Fallot (TOF).   Pt remains euvolemic today by history and exam.  Pt's cardiac MRI confirms RVEDVI remains < 150 ml/m2, measuring 120 ml/m2 today with stable normal RVEF as well as normal LV size and function and stable mildly dilated aortic root at 4.1cm.  BP is elevated today.  Will have patient increase diltiazem to 240mg daily.  Instructed patient to continue to monitor BP and call clinic if BP remains > 135/85.  Will plan to repeat CPX in 3-4 months once BP control optimized.  Encouraged patient to begin regular aerobic exercise aiming for at least 150 minutes of moderate physical activity or 75 minutes of vigorous physical activity - or an equal combination of both - each week. and follow low-salt, heart healthy diet.   Reminded patient of need to maintain good oral hygiene and continue regular dental care but by recent AHA/ACC guidelines with SBE prophylaxis. Of note, patient is not planning on any further pregnancies and has undergone BTL.     2.  Palpitations:  Seen by Dr. Shirley  Lucio, congenital EP.  Please see his note for detailed findings and plan.      FOLLOW UP:  One year with echo, ekg and labs.  Will be happy to see sooner if change in clinical status or new questions/concerns arise.      Jocelyn Cheney MD  Section Head - Advanced Heart Failure, Transplantation and Mechanical Circulatory Support  Director - Adult Congenital and Cardiovascular Genetics Center  Associate Professor of Medicine, [facility]    I spent a total of 30 minutes today with the patient personally reviewing recent cardiac testing and/or laboratory results, today's history and examination, and discussion and counseling with the patient.        Please do not hesitate to contact me if you have any questions/concerns.     Sincerely,     Jocelyn Cheney MD

## 2022-06-03 NOTE — PATIENT INSTRUCTIONS
You were seen today in the Adult Congenital and Cardiovascular Genetics Clinic at the [facility].    Cardiology Providers you saw during your visit:  Jocelyn Cheney MD and Casper Valdes MD    Diagnosis:  TOF    Results:  Jocelyn Cheney MD and Casper Valdes MD reviewed the results of your MRI/MRA and lab testing today in clinic.    Recommendations:    Continue to eat a heart healthy, low salt diet.  Continue to get 20-30 minutes of aerobic activity, 4-5 days per week.  Examples of aerobic activity include walking, running, swimming, cycling, etc.  Continue to observe good oral hygiene, with regular dental visits.  Increase diltiazem to 240 mg daily.   Monitor BP, if greater then 135/85 consistently please follow up with PCP for blood pressure control  Have a CPX done in 3-4 months, let your increased diltiazem work for a while first.       SBE prophylaxis:   Yes__x__  No____    Lifelong Bacterial Endocarditis Prophylaxis:  YES____  NO____    If YES is checked, follow the recommendations outlined below:  Take antibiotic(s) prior to recommended dental procedures and procedures on the respiratory tract or with infected skin, muscle or bones. SBE prophylaxis is not needed for routine GI and  procedures (ie. Colonoscopy or vaginal delivery)  Observe good oral hygiene daily, as advised by your dentist. Get regular professional dental care.  Keep cuts clean.  Infections should be treated promptly.  Symptoms of Infective Endocarditis could include: fever lasting more than 4-5 days or a recurrent fever that initially resolves but returns within 1-2 days)      Exercise restrictions:   Yes__X__  No____         If yes, list restrictions:  Must be allowed to rest if fatigued or SOB      Work restrictions:  Yes____  No_X___         If yes, list restrictions:    FASTING CHOLESTEROL was checked in the last 5 years YES_x__  NO___ (2022)  Continue to eat a heart healthy, low salt diet.         ____ Fasting lipid panel  order today         ____ No changes in medications          ____ I recommend the following changes in your cholesterol medications.:          ____ Please follow up for cholesterol screening at your primary care physician      Follow-up:  Follow up with Dr Valdes and Dr Cheney in 1 year with echo, ekg and labs prior    If you have questions or concerns please contact us at:    Sadie Hurd, ADWOAN, RN    Sindi Méndez (Scheduling)  Nurse Care Coordinator     Clinic   Adult Congenital and CV Genetics   Adult Congenital and CV Genetic  [facility]   [facility]  (P) [phone]     (P) [phone]         (F) [phone]        For after hours urgent needs, call [phone] and ask to speak to the Adult Congenital Physician on call.  Mention Job Code 0401.    For emergencies call 911.    [facility]  [facility]  Mail Code [id number]  [address] [place]  [address]

## 2022-06-03 NOTE — NURSING NOTE
Return Appointment: Patient given instructions regarding scheduling next clinic visit. Patient demonstrated understanding of this information and agreed to call with further questions or concerns.    Patient stated she understood all health information given and agreed to call with further questions or concerns.

## 2022-06-03 NOTE — PROGRESS NOTES
[facility]    Assessment/Recommendations   Assessment/Plan:      Ms. Gaviria is a 31 year old female who has a past medical history significant for TOF s/p surgical repair at 11 months, s/p pulmonary valve repair at 7 years, s/p pulmonary valve replacement at age 13 years, and palpitations.     Palpitations:   She began developing palpitations. She had an episode in 11/2021 with palpitations and dizziness and had to sit down. She was evaluated at OSH and ECG and echo were stable and no intervention was done. Her symptoms continued and she was seen again. A zio patch from OSH in 1/2022 reported to show 1 NSVT 6 beats, 45 PSVT up to 7.2 seconds, frequent PACs 14.4%, and rare isolated PVCs. 56 symptom activations reportedly corresponded to PAC/PVCs. She was started on Metoprolol. She had some weird hallucinations and metoprolol was lowered and this subsided. She started having some chest pains and metoprolol was decreased further but symptoms continued. Metoprolol was stopped in 4/5/22 due to this. We started her on Diltiazem and she is tolerating this well. This has improved her symptoms. No need to increase from EP standpoint, but increasing to Diltiazem 240 mg CD daily for better BP control.     Follow up in 1 year.           History of Present Illness/Subjective    Ms. Brittani Gaviria is a 31 year old female who comes in today for EP consultation of palpitations.    Ms. Gaviria is a 31 year old female who has a past medical history significant for TOF s/p surgical repair at 11 months, s/p pulmonary valve repair at 7 years, s/p pulmonary valve replacement at age 13 years, and palpitations.     She was born with TOF and underwent repair at 11 months. She required pulmonary valve repair at age 7 and the a pulmonary valve replacement at 13 years of age. Her last CMR at OSH showed normal biventricular function, severe RV dilation, moderate LV dilation, and moderate pulmonary valve  regurgitation. She began developing palpitations. She had an episode in 11/2021 with palpitations and dizziness and had to sit down. She was evaluated at OSH and ECG and echo were stable and no intervention was done. Her symptoms continued and she was seen again. A zio patch from OSH in 1/2022 reported to show 1 NSVT 6 beats, 45 PSVT up to 7.2 seconds, frequent PACs 14.4%, and rare isolated PVCs. 56 symptom activations reportedly corresponded to PAC/PVCs. She was started on Metoprolol. She had some weird hallucinations and metoprolol was lowered and this subsided. She started having some chest pains and metoprolol was decreased further but symptoms continued. Metoprolol was stopped in 4/5/22 due to this.     EP Visit 5/6/22: She reports feeling OK. She has noted her COOPER has progressed over last year. She continues to have palpitations many times a day for a minute or less. She denies chest discomfort, palpitations, abdominal fullness/bloating or peripheral edema, shortness of breath, paroxysmal nocturnal dyspnea, orthopnea, lightheadedness, dizziness, pre-syncope, or syncope. Presenting 12 lead ECG shows SR with PACs IVCD Vent Rate 73 bpm,  ms,  ms, QTc 464 ms. No current cardiac medications. She was started on Diltiazem at this visit.     She presents today for follow up. She reports feeling improved symptoms control with diltiazem. She still has some residual palpitations but much improved. She denies chest discomfort, abdominal fullness/bloating or peripheral edema, shortness of breath, paroxysmal nocturnal dyspnea, orthopnea, lightheadedness, dizziness, pre-syncope, or syncope. Current cardiac medication: Diltiazem.       I have reviewed and updated the patient's Past Medical History, Social History, Family History and Medication List.     Cardiographics (Personally Reviewed) :   11/2021 Echo (OSH Report):  CONCLUSIONS   Patient is status post repair of tetralogy of Fallot.   1. Left ventricular  "ejection fraction is visually estimated at 65%.   Abnormal septal motion consistent with prior open heart surgery.   2. Moderate right ventricular dilation is present. Global right   ventricular systolic function is moderately reduced.   3. Mixed pulmonary valve disease. Pulmonic stenosis. Peak velocity   through the pulmonary valve is 2.5 m/sec and a peak gradient 27 mm of   Hg..   Probably severe pulmonic regurgitation.   4. Proximal ascending aorta measures at 3.9 cm.   Compared to the prior study dated June 11, 2019 , no significant   change was noted.     2/10/17 Echo:      CONCLUSIONS  Patient after transannular patch repair for tetralogy of Fallot. There is no  residual ventricular level shunt. There is mild to moderate right ventricular  enlargement. Normal right ventricular systolic function. Normal left  ventricular systolic function with a calculated biplane left ventricular  ejection fraction of 60-65%. Trivial tricuspid valve insufficiency. Estimated  right ventricular systolic pressure is 25-30 mmHg plus right atrial pressure.  There is mild flow acceleration across the pulmonary valve with a peak  gradient across the pulmonary valve of 15-20 mmHg. Moderate to severe (3-4+)  pulmonary valve insufficiency. There is mild dilation of the aortic root at  the level of the sinuses of Valsalva.    10/30/20 CMR (OSH Report):  Impression:   1. Normal left and right ventricular function, EF 55.6 and 46.9% respectively   2. Moderate left ventricular enlargement with severe right ventricular enlargement.   3. Overriding aortic root with mildly dilated sinus of Valsalva at 41.9 mm.   4. Moderate pulmonary regurgitation with a regurgitant volume of 44.2 mL, regurgitation fraction of 32.3%.   5. No residual VSD          Physical Examination   BP (!) 149/93 (BP Location: Left arm, Patient Position: Chair, Cuff Size: Adult Large)   Pulse 83   Ht 1.688 m (5' 6.46\")   Wt 97.1 kg (214 lb 1.6 oz)   SpO2 98%   BMI " 34.08 kg/m    Wt Readings from Last 3 Encounters:   05/06/22 95.9 kg (211 lb 8 oz)   02/15/21 79.4 kg (175 lb)   02/01/21 79.4 kg (175 lb)     General Appearance:   Alert, well-appearing and in no acute distress.   HEENT: Atraumatic, normocephalic. PERRL.  MMM.   Chest/Lungs:   Respirations unlabored.  Lungs are clear to auscultation.   Cardiovascular:   Regular. Murmur present.    Abdomen:  Soft, nontender, nondistended.   Extremities: No cyanosis or clubbing. No edema.    Musculoskeletal: Moves all extremities.  Gait normal.   Skin: Warm, dry, intact.    Neurologic: Mood and affect are appropriate.  Alert and oriented to person, place, time, and situation.          Medications  Allergies   Current Outpatient Medications   Medication Sig Dispense Refill     acetaminophen (TYLENOL) 325 MG tablet Take 325-650 mg by mouth       azelastine (ASTELIN) 0.1 % nasal spray        benzoyl peroxide 5 % external liquid        Blood Pressure Monitoring (BLOOD PRESSURE CUFF) MISC Please check blood pressures as needed. 1 each 0     Cholecalciferol (VITAMIN D) 2000 UNITS tablet Take 2,000 Units by mouth daily 100 tablet 3     clindamycin (CLEOCIN T) 1 % external solution        cyclobenzaprine (FLEXERIL) 5 MG tablet Take 5-10 mg by mouth       diltiazem ER COATED BEADS (CARDIZEM CD) 120 MG 24 hr capsule Take 1 capsule (120 mg) by mouth daily 90 capsule 3     levonorgestrel (MIRENA) 20 MCG/DAY IUD 1 each by Intrauterine route       omeprazole (PRILOSEC) 20 MG DR capsule Take 20 mg by mouth       RETIN-A 0.025 % external cream        SUMAtriptan (IMITREX) 6 MG/0.5ML injection Inject 6 mg under the skin at onset of migraine.  May repeat in 1-2 hours, if needed.  No more than 2 per day or 9 days per month.      Allergies   Allergen Reactions     Blood Transfusion Related (Informational Only) Other (See Comments)     Patient has a history of a clinically significant antibody against RBC antigens.  A delay in compatible RBCs may occur.      Ketorolac Hives, Itching and Unknown     Tolerates ibuprofen without reaction       Ferumoxytol Difficulty breathing, Cramps, Hives, Itching and Muscle Pain (Myalgia)         Lab Results (Personally Reviewed)    Chemistry/lipid CBC Cardiac Enzymes/BNP/TSH/INR   Lab Results   Component Value Date    BUN 7 07/20/2016     07/20/2016    CO2 24 07/20/2016     Creatinine   Date Value Ref Range Status   07/20/2016 0.74 0.52 - 1.04 mg/dL Final       Lab Results   Component Value Date    CHOL 171 05/06/2022    HDL 48 (L) 05/06/2022     (H) 05/06/2022      Lab Results   Component Value Date    WBC 12.8 (H) 11/30/2016    HGB  11/30/2016     CHANGE ORDER PER MD  CORRECTED ON 12/01 AT 0931: PREVIOUSLY REPORTED AS 8.1      HGB 8.1 (L) 11/30/2016    HCT 26.6 (L) 11/30/2016    MCV 86 11/30/2016     11/30/2016    Lab Results   Component Value Date    TSH 1.27 05/06/2022        The patient states understanding and is agreeable with the plan.   Casper Valdes MD [facility]  Cardiology - Electrophysiology    Total time spent on patient visit, reviewing notes, imaging, labs, orders, and completing necessary documentation: 30 minutes.  >50% of visit spent on counseling patient and/or coordination of care.

## 2022-06-03 NOTE — PATIENT INSTRUCTIONS
You were seen today in the Adult Congenital and Cardiovascular Genetics Clinic at the [facility].    Cardiology Providers you saw during your visit:  Jocelyn Cheney MD and Casper Valdes MD    Diagnosis:  TOF    Results:  Jocelyn Cheney MD and Casper Valdes MD reviewed the results of your MRI/MRA and lab testing today in clinic.    Recommendations:    Continue to eat a heart healthy, low salt diet.  Continue to get 20-30 minutes of aerobic activity, 4-5 days per week.  Examples of aerobic activity include walking, running, swimming, cycling, etc.  Continue to observe good oral hygiene, with regular dental visits.  Increase diltiazem to 240 mg daily.   Monitor BP, if greater then 135/85 consistently please follow up with PCP for blood pressure control  Have a CPX done in 3-4 months, let your increased diltiazem work for a while first.       SBE prophylaxis:   Yes__x__  No____    Lifelong Bacterial Endocarditis Prophylaxis:  YES____  NO____    If YES is checked, follow the recommendations outlined below:  Take antibiotic(s) prior to recommended dental procedures and procedures on the respiratory tract or with infected skin, muscle or bones. SBE prophylaxis is not needed for routine GI and  procedures (ie. Colonoscopy or vaginal delivery)  Observe good oral hygiene daily, as advised by your dentist. Get regular professional dental care.  Keep cuts clean.  Infections should be treated promptly.  Symptoms of Infective Endocarditis could include: fever lasting more than 4-5 days or a recurrent fever that initially resolves but returns within 1-2 days)      Exercise restrictions:   Yes__X__  No____         If yes, list restrictions:  Must be allowed to rest if fatigued or SOB      Work restrictions:  Yes____  No_X___         If yes, list restrictions:    FASTING CHOLESTEROL was checked in the last 5 years YES_x__  NO___ (2022)  Continue to eat a heart healthy, low salt diet.         ____ Fasting lipid panel  order today         ____ No changes in medications          ____ I recommend the following changes in your cholesterol medications.:          ____ Please follow up for cholesterol screening at your primary care physician      Follow-up:  Follow up with Dr Valdes and Dr Cheney in 1 year with echo, ekg and labs prior    If you have questions or concerns please contact us at:    Sadie Hurd, ADWOAN, RN    Sindi Méndez (Scheduling)  Nurse Care Coordinator     Clinic   Adult Congenital and CV Genetics   Adult Congenital and CV Genetic  [facility]   [facility]  (P) [phone]     (P) [phone]         (F) [phone]        For after hours urgent needs, call [phone] and ask to speak to the Adult Congenital Physician on call.  Mention Job Code 0401.    For emergencies call 911.    [facility]  [facility]  Mail Code [id number]  [address] [place]  [address]

## 2022-06-03 NOTE — NURSING NOTE
Cardiac Testing: Patient given instructions regarding CPX. Discussed purpose, preparation, procedure and when to expect results reported back to the patient. Patient demonstrated understanding of this information and agreed to call with further questions or concerns.    Med Reconcile: Reviewed and verified all current medications with the patient. The updated medication list was printed and given to the patient.    New Medication: Patient was educated regarding newly prescribed medication, including discussion of  the indication, administration, side effects, and when to report to MD or RN. Patient demonstrated understanding of this information and agreed to call with further questions or concerns.    Return Appointment: Patient given instructions regarding scheduling next clinic visit. Patient demonstrated understanding of this information and agreed to call with further questions or concerns.    Patient stated she understood all health information given and agreed to call with further questions or concerns.

## 2022-06-03 NOTE — NURSING NOTE
Chief Complaint   Patient presents with     Follow Up     31 yr old female with PMH significant for TOF and mechanical valve presenting for evaluation.      Vitals were taken and medications reconciled.    Dickson Ivey, EMT  9:57 AM

## 2022-06-07 ENCOUNTER — LAB REQUISITION (OUTPATIENT)
Dept: LAB | Facility: CLINIC | Age: 32
End: 2022-06-07

## 2022-06-07 LAB — SARS-COV-2 RNA RESP QL NAA+PROBE: NEGATIVE

## 2022-06-07 PROCEDURE — U0003 INFECTIOUS AGENT DETECTION BY NUCLEIC ACID (DNA OR RNA); SEVERE ACUTE RESPIRATORY SYNDROME CORONAVIRUS 2 (SARS-COV-2) (CORONAVIRUS DISEASE [COVID-19]), AMPLIFIED PROBE TECHNIQUE, MAKING USE OF HIGH THROUGHPUT TECHNOLOGIES AS DESCRIBED BY CMS-2020-01-R: HCPCS | Performed by: INTERNAL MEDICINE

## 2022-06-10 ENCOUNTER — VIRTUAL VISIT (OUTPATIENT)
Dept: URGENT CARE | Facility: CLINIC | Age: 32
End: 2022-06-10
Payer: COMMERCIAL

## 2022-06-10 DIAGNOSIS — J02.9 SORE THROAT: Primary | ICD-10-CM

## 2022-06-10 PROCEDURE — 99203 OFFICE O/P NEW LOW 30 MIN: CPT | Mod: TEL | Performed by: NURSE PRACTITIONER

## 2022-06-10 NOTE — PROGRESS NOTES
"  Brittani Gaviria is a 31 year old female who is being evaluated via a billable telephone visit.      The patient has been notified of following:     \"This telephone visit will be conducted via a call between you and your physician/provider. We have found that certain health care needs can be provided without the need for a physical exam.  This service lets us provide the care you need with a short phone conversation.  If a prescription is necessary we can send it directly to your pharmacy.  If lab work is needed we can place an order for that and you can then stop by our lab to have the test done at a later time.    If during the course of the call the physician/provider feels a telephone visit is not appropriate, you will not be charged for this service.\"     Patient has given verbal consent for Telephone visit?  YES     ASSESSMENT:       ICD-10-CM    1. Sore throat  J02.9 Streptococcus A Rapid Scr w Reflx to PCR - Lab Collect   Salt water gargles, acetaminophen, strep test.      Worrisome symptoms discussed with instructions to go to the ED.  Patient verbalized understanding and agreed with this plan.  Chief Complaint:    No chief complaint on file.      HPI: Brittani Gaviria is an 31 year old female who calls with concerns that include sore throat for 2 weeks, runny nose (now stopped), COVID tested 3 times and all were negative. Too painful to swallow.    ROS:    A 10 point ROS is negative except as expressed in HPI.    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  A delay in compatible RBCs may occur.     Ketorolac Hives, Itching and Unknown     Tolerates ibuprofen without reaction       Ferumoxytol Difficulty breathing, Cramps, Hives, Itching and Muscle Pain (Myalgia)        Current Meds:    Current Outpatient Medications:      acetaminophen (TYLENOL) 325 MG tablet, Take 325-650 mg by mouth, Disp: , Rfl:      azelastine (ASTELIN) 0.1 % nasal spray, , Disp: , Rfl:      benzoyl peroxide 5 % external liquid, , Disp: , Rfl:      Blood Pressure Monitoring (BLOOD PRESSURE CUFF) MISC, Please check blood pressures as needed., Disp: 1 each, Rfl: 0     clindamycin (CLEOCIN T) 1 % external solution, , Disp: , Rfl:      cyclobenzaprine (FLEXERIL) 5 MG tablet, Take 5-10 mg by mouth, Disp: , Rfl:      diltiazem ER COATED BEADS (CARDIZEM CD) 120 MG 24 hr capsule, Take 2 capsules (240 mg) by mouth daily, Disp: 180 capsule, Rfl: 3     levonorgestrel (MIRENA) 20 MCG/DAY IUD, 1 each by Intrauterine route, Disp: , Rfl:      omeprazole (PRILOSEC) 20 MG DR capsule, Take 20 mg by mouth, Disp: , Rfl:      RETIN-A 0.025 % external cream, , Disp: , Rfl:      SUMAtriptan (IMITREX) 6 MG/0.5ML injection, Inject 6 mg under the skin at onset of migraine.  May repeat in 1-2 hours, if needed.  No more than 2 per day or 9 days per month., Disp: , Rfl:      PHYSICAL EXAM:     Patient is alert and oriented. Able to speak in full sentences. No wheezing or cough heard during telephone conversation. Mood is appropriate.     Angela Brooke CNP  6/10/2022, 11:45 AM      Phone call duration:  22 minutes

## 2022-06-10 NOTE — PATIENT INSTRUCTIONS
Schedule strep test today, if negative go in to urgent care to have your throat evaluated in person.

## 2022-07-12 ENCOUNTER — MYC MEDICAL ADVICE (OUTPATIENT)
Dept: CARDIOLOGY | Facility: CLINIC | Age: 32
End: 2022-07-12

## 2022-07-13 ENCOUNTER — MYC MEDICAL ADVICE (OUTPATIENT)
Dept: CARDIOLOGY | Facility: CLINIC | Age: 32
End: 2022-07-13

## 2022-07-13 DIAGNOSIS — Q21.3 TETRALOGY OF FALLOT: Primary | ICD-10-CM

## 2022-07-13 DIAGNOSIS — I51.7 ENLARGED RV (RIGHT VENTRICLE): ICD-10-CM

## 2022-07-13 DIAGNOSIS — I37.1 PULMONARY VALVE INSUFFICIENCY, UNSPECIFIED ETIOLOGY: ICD-10-CM

## 2022-07-15 ENCOUNTER — ANCILLARY PROCEDURE (OUTPATIENT)
Dept: CARDIOLOGY | Facility: CLINIC | Age: 32
End: 2022-07-15
Attending: HOSPITALIST
Payer: COMMERCIAL

## 2022-07-15 DIAGNOSIS — I37.1 PULMONARY VALVE INSUFFICIENCY, UNSPECIFIED ETIOLOGY: ICD-10-CM

## 2022-07-15 DIAGNOSIS — I51.7 ENLARGED RV (RIGHT VENTRICLE): ICD-10-CM

## 2022-07-15 DIAGNOSIS — Q21.3 TETRALOGY OF FALLOT: ICD-10-CM

## 2022-07-15 PROCEDURE — 93246 EXT ECG>7D<15D RECORDING: CPT

## 2022-07-15 PROCEDURE — 93248 EXT ECG>7D<15D REV&INTERPJ: CPT | Performed by: INTERNAL MEDICINE

## 2022-07-15 NOTE — PROGRESS NOTES
"Per Dr. Valdes, patient to have 14 day Zio monitor placed.  Diagnosis: Tetrology of Fallot [Q21.3]  Monitor placed: {YES / NO:416326::\"Yes\"}  Patient Instructed: {YES / NO:110396::\"Yes\"}  Patient verbalized understanding: {YES / NO:069608::\"Yes\"}  Holter # [id number]      "

## 2022-07-15 NOTE — TELEPHONE ENCOUNTER
Date: 7/15/2022    Time of Call: 9:49 AM     Diagnosis: TOF     [ TORB ] Ordering provider: Dr Valdes  Order: Let's repeat the Ziopatch for 2 weeks and see what we are treating....   H             Order received by:Sadie Hurd RN      Follow-up/additional notes: sent to Sindi to schedule placement

## 2022-08-10 ENCOUNTER — E-VISIT (OUTPATIENT)
Dept: URGENT CARE | Facility: CLINIC | Age: 32
End: 2022-08-10
Payer: COMMERCIAL

## 2022-08-10 DIAGNOSIS — R21 RASH AND NONSPECIFIC SKIN ERUPTION: Primary | ICD-10-CM

## 2022-08-10 PROCEDURE — 99207 PR NON-BILLABLE SERV PER CHARTING: CPT | Performed by: EMERGENCY MEDICINE

## 2022-08-10 NOTE — PATIENT INSTRUCTIONS
Dear Brittani Gaviria?     After reviewing your responses, I am unable to make a diagnosis that can be treated online.    You will not be charged for this eVisit.     We are dedicated to helping you achieve your best health and would like to see you in one of our many clinic locations - a primary care provider would be ideal for your concern.    Please use "[employer] to schedule a visit with a provider or call [phone] ([phone]) to schedule at any of our locations.    Thanks for choosing?us?as your health care partner,?       YOU CAN ALSO BE SEEN IN URGENT CARE.  ?   Feliz Dickens MD?

## 2022-08-15 ENCOUNTER — CARE COORDINATION (OUTPATIENT)
Dept: CARDIOLOGY | Facility: CLINIC | Age: 32
End: 2022-08-15

## 2022-08-15 DIAGNOSIS — Q21.3 TOF (TETRALOGY OF FALLOT): ICD-10-CM

## 2022-08-16 RX ORDER — DILTIAZEM HYDROCHLORIDE 180 MG/1
180 CAPSULE, COATED, EXTENDED RELEASE ORAL 2 TIMES DAILY
Qty: 180 CAPSULE | Refills: 3 | Status: SHIPPED | OUTPATIENT
Start: 2022-08-16 | End: 2023-06-02

## 2022-08-16 NOTE — PROGRESS NOTES
Date: 8/16/2022    Time of Call: 9:41 AM     Diagnosis:  TOF     [ TORB ] Ordering provider: Deyanira Sales NP  Order: Diltazem 180 mg BID     Order received by: Sadie Hurd RN     Follow-up/additional notes: patient updated on Zio results and would like to try the BID dose for a couple weeks to see if helps her symptoms without the chest pain side effect she experiences on 240 MG DAILY.    Let's have her divide the diltiazem dosing to try to have more stable coverage for her. Please have her take Diltiazem 180 mg BID. Also, please tell her that her zio patch did show some extra beats and little runs of fast heart rates but nothing sustained and nothing malignant. I want her to be reassured that it is not worrisome, but we definitely want to help control her symptoms as best possible. Please also tell her that she should maintain hydration as this helps a lot too. LVW

## 2022-08-29 ENCOUNTER — MYC MEDICAL ADVICE (OUTPATIENT)
Dept: CARDIOLOGY | Facility: CLINIC | Age: 32
End: 2022-08-29

## 2022-08-29 DIAGNOSIS — I10 BENIGN ESSENTIAL HYPERTENSION: ICD-10-CM

## 2022-08-29 DIAGNOSIS — Q21.3 TETRALOGY OF FALLOT: Primary | ICD-10-CM

## 2022-08-30 ENCOUNTER — ALLIED HEALTH/NURSE VISIT (OUTPATIENT)
Dept: CARDIOLOGY | Facility: CLINIC | Age: 32
End: 2022-08-30
Payer: COMMERCIAL

## 2022-08-30 VITALS — DIASTOLIC BLOOD PRESSURE: 91 MMHG | SYSTOLIC BLOOD PRESSURE: 149 MMHG | HEART RATE: 90 BPM

## 2022-08-30 DIAGNOSIS — Z01.30 BP CHECK: Primary | ICD-10-CM

## 2022-08-30 RX ORDER — LISINOPRIL 10 MG/1
10 TABLET ORAL DAILY
Qty: 90 TABLET | Refills: 3 | Status: SHIPPED | OUTPATIENT
Start: 2022-08-30 | End: 2022-09-27 | Stop reason: ALTCHOICE

## 2022-08-30 NOTE — TELEPHONE ENCOUNTER
Date: 8/30/2022    Time of Call: 5:06 PM     Diagnosis:  hypertension     [ TORB ] Ordering provider: Casper Valdes MD  Order: I reviewed with Dr. Valdes and he wants to have better control of her BP as he feels this is causing some of her symptoms (chest pain). He recommends starting Lisinopril 10 mg daily with BMP in 7-10 days. Please have her track her BPs daily and send us a log in 2-3 weeks.   Thanks,   LVW        Order received by: Sadie Hurd RN     Follow-up/additional notes: updated patient and sent to pharmacy

## 2022-08-30 NOTE — TELEPHONE ENCOUNTER
I notified Sadie MARTE RN for Congenital regarding this message and I will forward this to the Congenital pool for further review.     Fermin GOETZ

## 2022-09-09 ENCOUNTER — LAB (OUTPATIENT)
Dept: LAB | Facility: CLINIC | Age: 32
End: 2022-09-09
Payer: COMMERCIAL

## 2022-09-09 DIAGNOSIS — I10 BENIGN ESSENTIAL HYPERTENSION: ICD-10-CM

## 2022-09-09 DIAGNOSIS — Q21.3 TETRALOGY OF FALLOT: ICD-10-CM

## 2022-09-09 DIAGNOSIS — I37.1 PULMONARY REGURGITATION: ICD-10-CM

## 2022-09-09 DIAGNOSIS — I51.7 ENLARGED RV (RIGHT VENTRICLE): ICD-10-CM

## 2022-09-09 LAB
ALBUMIN SERPL BCG-MCNC: 4.4 G/DL (ref 3.5–5.2)
ALP SERPL-CCNC: 49 U/L (ref 35–104)
ALT SERPL W P-5'-P-CCNC: 15 U/L (ref 10–35)
ANION GAP SERPL CALCULATED.3IONS-SCNC: 11 MMOL/L (ref 7–15)
AST SERPL W P-5'-P-CCNC: 17 U/L (ref 10–35)
BILIRUB SERPL-MCNC: 1.2 MG/DL
BUN SERPL-MCNC: 10.5 MG/DL (ref 6–20)
CALCIUM SERPL-MCNC: 10.1 MG/DL (ref 8.6–10)
CHLORIDE SERPL-SCNC: 104 MMOL/L (ref 98–107)
CREAT SERPL-MCNC: 0.86 MG/DL (ref 0.51–0.95)
DEPRECATED HCO3 PLAS-SCNC: 26 MMOL/L (ref 22–29)
GFR SERPL CREATININE-BSD FRML MDRD: >90 ML/MIN/1.73M2
GLUCOSE SERPL-MCNC: 77 MG/DL (ref 70–99)
POTASSIUM SERPL-SCNC: 4 MMOL/L (ref 3.4–5.3)
PROT SERPL-MCNC: 8.1 G/DL (ref 6.4–8.3)
SODIUM SERPL-SCNC: 141 MMOL/L (ref 136–145)

## 2022-09-09 PROCEDURE — 80053 COMPREHEN METABOLIC PANEL: CPT | Performed by: PATHOLOGY

## 2022-09-09 PROCEDURE — 36415 COLL VENOUS BLD VENIPUNCTURE: CPT | Performed by: PATHOLOGY

## 2022-09-11 ENCOUNTER — HEALTH MAINTENANCE LETTER (OUTPATIENT)
Age: 32
End: 2022-09-11

## 2022-09-22 ENCOUNTER — MYC MEDICAL ADVICE (OUTPATIENT)
Dept: CARDIOLOGY | Facility: CLINIC | Age: 32
End: 2022-09-22

## 2022-09-22 DIAGNOSIS — Q21.3 TETRALOGY OF FALLOT: Primary | ICD-10-CM

## 2022-09-22 DIAGNOSIS — I10 BENIGN ESSENTIAL HYPERTENSION: ICD-10-CM

## 2022-09-26 ENCOUNTER — ALLIED HEALTH/NURSE VISIT (OUTPATIENT)
Dept: FAMILY MEDICINE | Facility: CLINIC | Age: 32
End: 2022-09-26
Payer: COMMERCIAL

## 2022-09-26 VITALS — DIASTOLIC BLOOD PRESSURE: 93 MMHG | SYSTOLIC BLOOD PRESSURE: 131 MMHG

## 2022-09-26 DIAGNOSIS — Z01.30 BP CHECK: Primary | ICD-10-CM

## 2022-09-26 PROCEDURE — 99207 PR NO CHARGE NURSE ONLY: CPT | Performed by: FAMILY MEDICINE

## 2022-09-26 NOTE — PROGRESS NOTES
Brittani Gaviria was evaluated at [facility] on September 26, 2022 at which time her blood pressure was:    BP Readings from Last 3 Encounters:   09/23/22 (!) 131/93   08/30/22 (!) 149/91   06/03/22 (!) 149/93     Pulse Readings from Last 3 Encounters:   08/30/22 90   06/03/22 83   06/03/22 83       Reviewed lifestyle modifications for blood pressure control and reduction: including making healthy food choices, managing weight, getting regular exercise, smoking cessation, reducing alcohol consumption, monitoring blood pressure regularly.     Symptoms: None    BP Goal:< 140/90 mmHg    BP Assessment:  BP too high    Potential Reasons for BP too high: NA - Not applicable    BP Follow-Up Plan: Recheck BP in 6 months at pharmacy    Recommendation to Provider: No recommendations     Note completed by: Dee De Los Santos, Pharm D

## 2022-09-27 RX ORDER — LOSARTAN POTASSIUM 25 MG/1
100 TABLET ORAL DAILY
Qty: 90 TABLET | Refills: 3 | Status: SHIPPED | OUTPATIENT
Start: 2022-09-27 | End: 2022-09-27

## 2022-09-27 RX ORDER — LISINOPRIL 10 MG/1
10 TABLET ORAL DAILY
Qty: 90 TABLET | Refills: 3 | Status: SHIPPED | OUTPATIENT
Start: 2022-09-27 | End: 2023-01-06

## 2022-09-27 NOTE — TELEPHONE ENCOUNTER
Patient not ready to start losartan yet, she will reach out when she is ready, she will continue lisinopril

## 2022-09-27 NOTE — TELEPHONE ENCOUNTER
Date: 9/27/2022    Time of Call: 3:10 PM     Diagnosis:  htn     [ TORB ] Ordering provider: Dr Casper Valdes  Order:  discontinue lisinopril, start losartan 100 mg daily    Order received by: Sadie Hurd RN     Follow-up/additional notes: updated patient

## 2022-11-04 ENCOUNTER — MYC MEDICAL ADVICE (OUTPATIENT)
Dept: CARDIOLOGY | Facility: CLINIC | Age: 32
End: 2022-11-04

## 2022-11-16 ENCOUNTER — HOSPITAL ENCOUNTER (OUTPATIENT)
Dept: CARDIOLOGY | Facility: CLINIC | Age: 32
Discharge: HOME | End: 2022-11-16
Attending: INTERNAL MEDICINE | Admitting: INTERNAL MEDICINE
Payer: COMMERCIAL

## 2022-11-16 VITALS — BODY MASS INDEX: 35.57 KG/M2 | HEIGHT: 66 IN | WEIGHT: 221.34 LBS

## 2022-11-16 DIAGNOSIS — Q21.3 TOF (TETRALOGY OF FALLOT): ICD-10-CM

## 2022-11-16 DIAGNOSIS — Z13.6 CARDIOVASCULAR SCREENING; LDL GOAL LESS THAN 160: ICD-10-CM

## 2022-11-16 DIAGNOSIS — I10 ESSENTIAL HYPERTENSION: ICD-10-CM

## 2022-11-16 DIAGNOSIS — I37.1 PULMONARY VALVE INSUFFICIENCY, UNSPECIFIED ETIOLOGY: ICD-10-CM

## 2022-11-16 DIAGNOSIS — I51.7 ENLARGED RV (RIGHT VENTRICLE): ICD-10-CM

## 2022-11-16 PROCEDURE — 94621 CARDIOPULM EXERCISE TESTING: CPT

## 2022-11-16 PROCEDURE — 94621 CARDIOPULM EXERCISE TESTING: CPT | Mod: 26 | Performed by: INTERNAL MEDICINE

## 2022-11-17 LAB
CARDIOPULMONARY ANAEROBIC THRESHOLD PREDICTED PEAK: 49 %
CARDIOPULMONARY ANAEROBIC THRESHOLD VO2: 15.9 ML/KG/MIN
CARDIOPULMONARY BLOOD PRESSURE REST: NORMAL MMHG
CARDIOPULMONARY BREATHING RESERVE REST: 90.6
CARDIOPULMONARY BREATHING RESERVE V02MAX: 16
CARDIOPULMONARY CO2 OUTPUT REST: 261 ML/MIN
CARDIOPULMONARY CO2 OUTPUT VO2MAX: 2791 ML/MIN
CARDIOPULMONARY FEV 1.0 (L) ACTUAL: 2.84
CARDIOPULMONARY FEV 1.0 (L) PRECENT: 84 %
CARDIOPULMONARY FEV 1.0 (L) PREDICTED: 3.38
CARDIOPULMONARY FEV 1.0 FVC (%) ACTUAL: 87.7
CARDIOPULMONARY FEV 1.0 FVC (%) PERCENT: 105 %
CARDIOPULMONARY FEV 1.0 FVC (%) PREDICTED: 83.8
CARDIOPULMONARY FUNCTIONAL CAPACITY MAX ML/KG/MIN: 20.2 ML/KG/MIN
CARDIOPULMONARY FUNCTIONAL CAPACITY PERCENT: 62 %
CARDIOPULMONARY FUNCTIONAL CAPACITY PREDICTED: 32.7 ML/KG/MIN
CARDIOPULMONARY FVC (L) ACTUAL: 3.24
CARDIOPULMONARY FVC (L) PERCENT: 80 %
CARDIOPULMONARY FVC (L) PREDICTED: 4.07
CARDIOPULMONARY HEART RATE REST: 77 BPM
CARDIOPULMONARY MET'S REST: 1
CARDIOPULMONARY MINUTE VENTILATION REST: 9.3 L/MIN
CARDIOPULMONARY MINUTE VENTILATION VO2MAX: 83 L/MIN
CARDIOPULMONARY MYOCARDIAC O2 DEMAND MAX: NORMAL
CARDIOPULMONARY OXYGEN CONSUMPTION REST: 3.6 ML/KG/MIN
CARDIOPULMONARY OXYGEN CONSUMPTION VO2MAX: 20.2 ML/KG/MIN
CARDIOPULMONARY OXYGEN PULSE REST: 5 ML/BEAT
CARDIOPULMONARY OXYGEN PULSE VO2MAX: 11.5 ML/BEAT
CARDIOPULMONARY OXYGEN SATURATION- OXIMETRY REST: 100 %
CARDIOPULMONARY OXYGEN SATURATION- OXIMETRY VO2MAX: 100 %
CARDIOPULMONARY PET C02 REST: 39
CARDIOPULMONARY PET C02 VO2MAX: 37
CARDIOPULMONARY PET02 REST: 96
CARDIOPULMONARY PET02 V02 MAX: 115
CARDIOPULMONARY RER: 1.32
CARDIOPULMONARY RESPIRALORY EXCHANGE RATIO VO2MAX: 1.32
CARDIOPULMONARY RESPIRALORY EXCHANGE RATIO: 0.73
CARDIOPULMONARY RESPIRATORY RATE REST: 20 BR/MIN
CARDIOPULMONARY RESPIRATORY RATE VO2MAX: 51 BR/MIN
CARDIOPULMONARY STRESS BASE 1 BP MMHG: NORMAL MMHG
CARDIOPULMONARY STRESS BASE 1 BPA: 139 BPM
CARDIOPULMONARY STRESS BASE 1 SPO2: 100 % SPO2
CARDIOPULMONARY STRESS BASE 1 TIME: 1 MINS
CARDIOPULMONARY STRESS BASE 2 BP MMHG: NORMAL MMHG
CARDIOPULMONARY STRESS BASE 2 BPA: 109 BPM
CARDIOPULMONARY STRESS BASE 2 SPO2: 100 % SPO2
CARDIOPULMONARY STRESS BASE 2 TIME: 3 MINS
CARDIOPULMONARY STRESS BASE 3 BP MMHG: NORMAL MMHG
CARDIOPULMONARY STRESS BASE 3 BPA: 91 BPM
CARDIOPULMONARY STRESS BASE 3 SPO2: 100 % SPO2
CARDIOPULMONARY STRESS BASE 3 TIME: 5 MINS
CARDIOPULMONARY STRESS PHASE 1 BP MMHG: NORMAL MMHG
CARDIOPULMONARY STRESS PHASE 1 BPM: 92 BPM
CARDIOPULMONARY STRESS PHASE 1 SPO2: 100 % SPO2
CARDIOPULMONARY STRESS PHASE 1 TIME: 2 MINS
CARDIOPULMONARY STRESS PHASE 2 BP MMHG: NORMAL MMHG
CARDIOPULMONARY STRESS PHASE 2 BPM: 109 BPM
CARDIOPULMONARY STRESS PHASE 2 SPO2: 100 % SPO2
CARDIOPULMONARY STRESS PHASE 2 TIME: 4 MINS
CARDIOPULMONARY STRESS PHASE 3 BP MMHG: NORMAL MMHG
CARDIOPULMONARY STRESS PHASE 3 BPM: 122 BPM
CARDIOPULMONARY STRESS PHASE 3 SPO2: 100 % SPO2
CARDIOPULMONARY STRESS PHASE 3 TIME: 6 MINS
CARDIOPULMONARY STRESS PHASE 4 BP MMHG: NORMAL MMHG
CARDIOPULMONARY STRESS PHASE 4 BPM: 146 BPM
CARDIOPULMONARY STRESS PHASE 4 SPO2: 100 % SPO2
CARDIOPULMONARY STRESS PHASE 4 TIME: 8 MINS
CARDIOPULMONARY STRESS PHASE 5 BP MMHG: NORMAL MMHG
CARDIOPULMONARY STRESS PHASE 5 BPM: 176 BPM
CARDIOPULMONARY STRESS PHASE 5 SPO2: 100 % SPO2
CARDIOPULMONARY STRESS PHASE 5 TIME SEC: 34 SEC
CARDIOPULMONARY STRESS PHASE 5 TIME: 9 MINS
CARDIOPULMONARY SVC (L) ACTUAL: 3.12
CARDIOPULMONARY SVC (L) PERCENT: 77 %
CARDIOPULMONARY SVC (L) PREDICTED: 4.07
CARDIOPULMONARY TIDAL VOLUME REST: 472 ML
CARDIOPULMONARY TIDAL VOLUME VO2MAX: 1621 ML
CARDIOPULMONARY VE/VCO2 SLOPE: 29.29
CARDIOPULMONARY VENTILATORY EQUIVALENT 02 REST: 26
CARDIOPULMONARY VENTILATORY EQUIVALENT 02 V02: 38
CARDIOPULMONARY VENTILATORY EQUIVALENT C02 REST: 36
CARDIOPULMONARY VENTILATORY EQUIVALENT C02 SLOPE VO2MAX: 29.29
CARDIOPULMONARY VENTILATORY EQUIVALENT C02 VO2MAX: 30
CV STRESS MAX HR HE: 176
PREDICTED VO2MAX: 32.7
RATED PERCEIVED EXERTION: 16
STRESS ANGINA INDEX: 0
STRESS ECHO BASELINE BP: NORMAL MMHG
STRESS ECHO BASELINE HR: 74 BPM
STRESS ECHO CALCULATED PERCENT HR: 94 %
STRESS ECHO LAST STRESS BP: NORMAL MMHG
STRESS ECHO POST ESTIMATED WORKLOAD: 5.8 METS
STRESS ECHO POST EXERCISE DUR MIN: 9 MIN
STRESS ECHO POST EXERCISE DUR SEC: 34 SEC
STRESS ECHO TARGET HR: 187

## 2022-11-29 NOTE — TELEPHONE ENCOUNTER
DIAGNOSIS: ToF   DATE OF APPOINTMENT: 12/09/2022   NOTES STATUS DETAILS   OFFICE VISIT NOTES with cardiologist Internal Lucio 05/2022, Shravan 06/03/2022, Lucio 06/03/2022     OPERATIVE REPORTS  N/A- they don't have records [facility] age 13   GENETIC TESTING N/A    LABS   Internal, careeverywhere 09/19/2022 CBC, 2022 labs internal   SCANS     EKG/MONITORS   Internal    DEVICE CHECK N/A    IMAGES      ECHO   Internal, PACS 2019 [facility], 2015 internal   STRESS TEST   Internal    CATH   N/A    ULTRASOUND (carotid, extremities) N/A    MRI/MRA (head, neck, chest, abdomen, pelvis) PACS  2020   CT/CTA (head, neck, chest, abdomen, pelvis) Care Everywhere    XRAY (chest) N/A    DEXA (report only) N/A

## 2022-12-09 ENCOUNTER — PRE VISIT (OUTPATIENT)
Dept: CARDIOLOGY | Facility: CLINIC | Age: 32
End: 2022-12-09

## 2022-12-09 ENCOUNTER — OFFICE VISIT (OUTPATIENT)
Dept: CARDIOLOGY | Facility: CLINIC | Age: 32
End: 2022-12-09
Payer: COMMERCIAL

## 2022-12-09 VITALS
BODY MASS INDEX: 36.47 KG/M2 | WEIGHT: 218.9 LBS | HEIGHT: 65 IN | SYSTOLIC BLOOD PRESSURE: 132 MMHG | HEART RATE: 69 BPM | OXYGEN SATURATION: 97 % | DIASTOLIC BLOOD PRESSURE: 88 MMHG

## 2022-12-09 DIAGNOSIS — I51.7 ENLARGED RV (RIGHT VENTRICLE): ICD-10-CM

## 2022-12-09 DIAGNOSIS — Q21.3 TETRALOGY OF FALLOT: ICD-10-CM

## 2022-12-09 DIAGNOSIS — I10 ESSENTIAL HYPERTENSION: Primary | ICD-10-CM

## 2022-12-09 DIAGNOSIS — I37.1 PULMONARY VALVE INSUFFICIENCY, UNSPECIFIED ETIOLOGY: ICD-10-CM

## 2022-12-09 PROCEDURE — G0463 HOSPITAL OUTPT CLINIC VISIT: HCPCS | Mod: 25

## 2022-12-09 PROCEDURE — 93005 ELECTROCARDIOGRAM TRACING: CPT

## 2022-12-09 PROCEDURE — 99215 OFFICE O/P EST HI 40 MIN: CPT

## 2022-12-09 RX ORDER — HYDROCHLOROTHIAZIDE 12.5 MG/1
12.5 TABLET ORAL DAILY
Qty: 90 TABLET | Refills: 3 | Status: SHIPPED | OUTPATIENT
Start: 2022-12-09 | End: 2023-01-06

## 2022-12-09 RX ORDER — ERGOCALCIFEROL 1.25 MG/1
CAPSULE, LIQUID FILLED ORAL
Status: ON HOLD | COMMUNITY
Start: 2022-12-05 | End: 2024-07-01

## 2022-12-09 ASSESSMENT — ENCOUNTER SYMPTOMS
SYNCOPE: 0
PALPITATIONS: 1
SKIN CHANGES: 0
LEG PAIN: 0
NUMBNESS: 1
ARTHRALGIAS: 0
LIGHT-HEADEDNESS: 1
HYPERTENSION: 1
NAIL CHANGES: 0
EXERCISE INTOLERANCE: 0
POOR WOUND HEALING: 0
SPEECH CHANGE: 0
MEMORY LOSS: 0
TREMORS: 0
MUSCLE CRAMPS: 0
NECK PAIN: 1
BACK PAIN: 1
PARALYSIS: 0
WEAKNESS: 0
JOINT SWELLING: 0
LOSS OF CONSCIOUSNESS: 0
SEIZURES: 0
HEADACHES: 1
ORTHOPNEA: 1
TINGLING: 1
DISTURBANCES IN COORDINATION: 0
HYPOTENSION: 0
STIFFNESS: 0
MUSCLE WEAKNESS: 0
MYALGIAS: 1
SLEEP DISTURBANCES DUE TO BREATHING: 0

## 2022-12-09 ASSESSMENT — PAIN SCALES - GENERAL: PAINLEVEL: NO PAIN (0)

## 2022-12-09 NOTE — LETTER
"2022      RE: Brittani Gaviria  [address]  [place] [address]       Dear Colleague,    Thank you for the opportunity to participate in the care of your patient, Brittani Gaviria, at the [facility] at [facility]. Please see a copy of my visit note below.    Adult Congenital Cardiology Clinic Visit    HPI: Brittani Gaviria is a 32 year old female with history of surgically repaired tetralogy of fallot who presents for ongoing evaluation and management.  Pt reports that in general she is feeling a little better than when she was here in . She is on 180 mg diltiazem daily and feels beter on that. She has palpitations several times daily with a feeling of fullness and pressure in her head. Her edema is improved, she notices it rarely.  She denies any chest pain or pressure, sob/torrez, orthopnea, pnd, syncope/presyncope or significant change in exercise tolerance. She does stay home from work about 2 times weekly due to frequent palpitations and head discomfort.    Last MRA with 40% PI fraction and RVEDV 120 ml/m2.   Stress test with approx 60% expected VO2, hypertensive response to exercise    Cardiac History: Pt reports that she was born in [place] at term and was delivered via .  She was diagnosed with TOF shortly after birth but :had to wait to grow\" before intervention.  She reports that she underwent complete surgical repair at 11 months of age.  She then underwent a \"valve repair\" at age age and then \"valve replacement\" at 13 years of age.  The first two procedures were performed at [facility] which is now closed. The last was performed at [facility] in [place], [place].  She has never been anticoagulated.  She does not recall any significant cardiac limitations growing up.  She may have not a little less energy/exercise tolerance but this was mild.  She denies having any special limitations or " restriction.  The patient followed up at [place] until she was 17 then at [facility] in [place] (age 17 - 21) and [place], [place].  Her first pregnancy at age 20 was uncomplicated.  The second pregnancy at age 21 was also uncomplicated.  The third pregnancy at age 24 resulted in miscarriage at 13 weeks.  Fourth pregnancy delivered at [facility] via planned  due to prior .      PAST MEDICAL HISTORY:  Past Medical History:   Diagnosis Date     Carpal tunnel syndrome     right, conservative management      Chlamydia      Cholelithiasis     resolved     Hyperparathyroidism (H) 2016     Iron deficiency anemia      Kidney stones      Migraines      Other and unspecified ovarian cyst      Rh sensitization 2016     Tetralogy of Fallot     surgical correction   Bilateral tubal ligation/mirena IUD  Ventral hernia repair, 2022          CURRENT MEDICATIONS:  Current Outpatient Medications   Medication Sig Dispense Refill     acetaminophen (TYLENOL) 325 MG tablet Take 325-650 mg by mouth       azelastine (ASTELIN) 0.1 % nasal spray        benzoyl peroxide 5 % external liquid        Blood Pressure Monitoring (BLOOD PRESSURE CUFF) MISC Please check blood pressures as needed. 1 each 0     clindamycin (CLEOCIN T) 1 % external solution        cyclobenzaprine (FLEXERIL) 5 MG tablet Take 5-10 mg by mouth       diltiazem ER COATED BEADS (CARDIZEM CD) 180 MG 24 hr capsule Take 1 capsule (180 mg) by mouth 2 times daily 180 capsule 3     levonorgestrel (MIRENA) 20 MCG/DAY IUD 1 each by Intrauterine route       lisinopril (ZESTRIL) 10 MG tablet Take 1 tablet (10 mg) by mouth daily 90 tablet 3     RETIN-A 0.025 % external cream        vitamin D2 (ERGOCALCIFEROL) 63872 units (1250 mcg) capsule          PAST SURGICAL HISTORY:  Past Surgical History:   Procedure Laterality Date     CARDIAC SURGERY      x 3 @ ages , 11 months, 13 years      SECTION      x 2  "    CHOLECYSTECTOMY, LAPOROSCOPIC  2014    Cholecystectomy, Laparoscopic       ALLERGIES  Blood transfusion related (informational only), Ketorolac, and Ferumoxytol    FAMILY HX:  Family History   Problem Relation Age of Onset     Diabetes Maternal Grandmother      Hypertension Father      Hypertension Sister      Multiple Sclerosis Mother      Cerebrovascular Disease Mother      Coronary Artery Disease No family hx of      Depression No family hx of      Anxiety Disorder No family hx of      Anesthesia Reaction No family hx of      Cancer No family hx of      Thyroid Disease No family hx of      Glaucoma No family hx of      Macular Degeneration No family hx of        SOCIAL HX:  4 children ages 3-12 years, works in [employer]. 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          VITAL SIGNS:  /88 (BP Location: Right arm, Patient Position: Sitting, Cuff Size: Adult Regular)   Pulse 69   Ht 1.66 m (5' 5.35\")   Wt 99.3 kg (218 lb 14.4 oz)   SpO2 97%   BMI 36.03 kg/m    Body mass index is 36.03 kg/m .  Wt Readings from Last 2 Encounters:   12/09/22 99.3 kg (218 lb 14.4 oz)   11/16/22 100.4 kg (221 lb 5.5 oz)   General: appears comfortable, alert and articulate  Neck: no adenopathy, 2+ carotids without bruits  Heart: Normal S1 and widened splitting of S2.    SOFT SM, louder DM grade 3. No rub, or gallop.    Lungs: clear, no rales or wheezing  Abdomen: soft, non-tender  Extremities: trace pitting edema both LE to mid chin  Neuro: no motor " deficits       LABS  WNL 9/9/22      EKG:NSR 69 bpm  RBBB QRS duration 140 msec      Ziopatch Nov 21 symptomatic PAC/PVC possible EAT.     Echo 2/10/17  Patient after transannular patch repair for tetralogy of Fallot. There is no  residual ventricular level shunt. There is mild to moderate right ventricular  enlargement. Normal right ventricular systolic function. Normal left ventricular systolic function with a calculated biplane left ventricular ejection fraction of 60-65%. Trivial tricuspid valve insufficiency. Estimated right ventricular systolic pressure is 25-30 mmHg plus right atrial pressure. There is mild flow acceleration across the pulmonary valve with a peak gradient across the pulmonary valve of 15-20 mmHg. Moderate to severe (3-4+) pulmonary valve insufficiency. There is mild dilation of the aortic root at the level of the sinuses of Valsalva.    CMR Report 5-  Clinical history: 31 F Tetralogy of Fallot repair, severe PI  Comparison CMR: 2020 done at [facility]  1. The LV is normal in cavity size and wall thickness. The global systolic function is normal. The LVEF is 60%. There are no regional wall motion abnormalities. There is no residual VSD  2. The RV is moderately dilated in cavity size. The global systolic function is normal. The RVEF is 53%.   3. Both atria are normal in size. There is no ASD.   4. There is moderate-severe PI (RV 45 ml, RF 40%) and mild PS (peak gr 16 mmHg).   5. Late gadolinium enhancement imaging shows no MI, fibrosis or infiltrative disease.   6. There is no pericardial effusion or thickening.  7. There is no intracardiac thrombus.  MRA Aorta  1. The aortic root is overriding and mildly dilated. The ascending aorta, transverse arch and descending thoracic aorta are normal in size without an aneurysm or dissection.  2. The aortic arch is left sided. There is normal branching of the arch vessels. There is no coarctation.  3. The main PA is mildly narrowed. The  proximal branch pulmonary arteries are normal in size.   4. The systemic venous connections are normal.   5. Normal pulmonary venous anatomy with all pulmonary veins draining to the left atrium.  CONCLUSIONS: Repaired Tetralogy of Fallot, moderate-severe PI (RV 45 ml, RF 40%) and mild PS (peak gr 16mmHg), moderately dilated RV with normal function (RV EDVI 120 ml/m2, RVEF 53%). Mildly dilated aortic root measuring 4.1 cm. No significant change compared to study from 2020.          ASSESSMENT AND PLAN:  32 year old female with history of surgically repaired tetralogy of Fallot. Increased palpitations/edema since Nov 2020. Wide open PI and right atrial enlargement. RVE at 120 ml/m2 last MRA. Normal LV and RV function by MRI. Hypertension, better controlled on lisinopril 10 mg daily. Some symptomatic improvement on diltiazem. Still with symptomatic palpitations and trace edema    Recommendations:  Start hydrochlorothiazide 12.5 mg daily  BMP in 2 weeks  Continue diltiazem 180 mg daily  Zio patch Holter prior to next visit  F/U [facility] March 2022  F/U [facility] March 2022 with echo and zio patch Holter prior  Call if new or worsening symptoms        A total of 30 minutes were spent in personal review of testing, including MRI, stress rojas and labs,  review of documented history and interval events in chart, face to face visit with discussion of findings and plan, and documentation.     Neil Wilkins M.D.   of Pediatrics  Pediatric and Adult Congenital Cardiology  [facility]  Pediatric Cardiology Office [phone]  Adult Congenital Cardiology Triage and Scheduling [phone]  Answers for HPI/ROS submitted by the patient on 12/9/2022  General Symptoms: No  Skin Symptoms: Yes  HENT Symptoms: No  EYE SYMPTOMS: No  HEART SYMPTOMS: Yes  LUNG SYMPTOMS: No  INTESTINAL SYMPTOMS: No  URINARY SYMPTOMS: No  GYNECOLOGIC  SYMPTOMS: No  BREAST SYMPTOMS: No  SKELETAL SYMPTOMS: Yes  BLOOD SYMPTOMS: No  NERVOUS SYSTEM SYMPTOMS: Yes  MENTAL HEALTH SYMPTOMS: No  Changes in hair: Yes  Changes in moles/birth marks: No  Itching: Yes  Changes in nails: No  Acne: No  Hair in places you don't want it: No  Change in facial hair: No  Warts: No  Non-healing sores: No  Scarring: No  Flaking of skin: No  Color changes of hands/feet in cold : No  Sun sensitivity: No  Skin thickening: No  Chest pain or pressure: Yes  Fast or irregular heartbeat: Yes  Pain in legs with walking: No  Trouble breathing while lying down: Yes  Fingers or toes appear blue: No  High blood pressure: Yes  Low blood pressure: No  Fainting: No  Murmurs: Yes  Pacemaker: No  Varicose veins: No  Edema or swelling: No  Wake up at night with shortness of breath: No  Light-headedness: Yes  Exercise intolerance: No  Back pain: Yes  Muscle aches: Yes  Neck pain: Yes  Swollen joints: No  Joint pain: No  Bone pain: Yes  Muscle cramps: No  Muscle weakness: No  Joint stiffness: No  Bone fracture: No  Trouble with coordination: No  Fainting or black-out spells: No  Memory loss: No  Headache: Yes  Seizures: No  Speech problems: No  Tingling: Yes  Tremor: No  Weakness: No  Difficulty walking: No  Paralysis: No  Numbness: Yes          Please do not hesitate to contact me if you have any questions/concerns.     Sincerely,     Neil Wilkins MD

## 2022-12-09 NOTE — NURSING NOTE
Chief Complaint   Patient presents with     New Patient     32 yr old female with PMH significant for TOF and mechanical valve presenting for evaluation.          Vitals were taken, medications reconciled and EKG performed.     Wilber Dee, EMT   1:33 PM

## 2022-12-09 NOTE — NURSING NOTE
Cardiac Testing: Patient given instructions regarding  echocardiogram . Discussed purpose, preparation, procedure and when to expect results reported back to the patient. Patient demonstrated understanding of this information and agreed to call with further questions or concerns.    Labs: Patient was given results of the laboratory testing obtained today. Patient was instructed to return for the next laboratory testing in 2 weeks . Patient demonstrated understanding of this information and agreed to call with further questions or concerns.     Med Reconcile: Reviewed and verified all current medications with the patient. The updated medication list was printed and given to the patient.    New Medication: Patient was educated regarding newly prescribed medication, including discussion of  the indication, administration, side effects, and when to report to MD or RN. Patient demonstrated understanding of this information and agreed to call with further questions or concerns.    Return Appointment: Patient given instructions regarding scheduling next clinic visit. Patient demonstrated understanding of this information and agreed to call with further questions or concerns.    Medication Change: Patient was educated regarding prescribed medication change, including discussion of the indication, administration, side effects, and when to report to MD or RN. Patient demonstrated understanding of this information and agreed to call with further questions or concerns.    Patient stated she understood all health information given and agreed to call with further questions or concerns.

## 2022-12-09 NOTE — PROGRESS NOTES
"Adult Congenital Cardiology Clinic Visit    HPI: Brittani Gaviria is a 32 year old female with history of surgically repaired tetralogy of fallot who presents for ongoing evaluation and management.  Pt reports that in general she is feeling a little better than when she was here in . She is on 180 mg diltiazem daily and feels beter on that. She has palpitations several times daily with a feeling of fullness and pressure in her head. Her edema is improved, she notices it rarely.  She denies any chest pain or pressure, sob/torrez, orthopnea, pnd, syncope/presyncope or significant change in exercise tolerance. She does stay home from work about 2 times weekly due to frequent palpitations and head discomfort.    Last MRA with 40% PI fraction and RVEDV 120 ml/m2.   Stress test with approx 60% expected VO2, hypertensive response to exercise    Cardiac History: Pt reports that she was born in [place] at term and was delivered via .  She was diagnosed with TOF shortly after birth but :had to wait to grow\" before intervention.  She reports that she underwent complete surgical repair at 11 months of age.  She then underwent a \"valve repair\" at age age and then \"valve replacement\" at 13 years of age.  The first two procedures were performed at [facility] which is now closed. The last was performed at [facility] in [place], [place].  She has never been anticoagulated.  She does not recall any significant cardiac limitations growing up.  She may have not a little less energy/exercise tolerance but this was mild.  She denies having any special limitations or restriction.  The patient followed up at [place] until she was 17 then at [facility] in [place] (age 17 - 21) and [place], [place].  Her first pregnancy at age 20 was uncomplicated.  The second pregnancy at age 21 was also uncomplicated.  The third pregnancy at age 24 resulted in miscarriage at 13 weeks.  Fourth pregnancy " delivered at [facility] via planned  due to prior .      PAST MEDICAL HISTORY:  Past Medical History:   Diagnosis Date     Carpal tunnel syndrome     right, conservative management      Chlamydia 2011     Cholelithiasis 2014    resolved     Hyperparathyroidism (H) 2016     Iron deficiency anemia 2015     Kidney stones      Migraines      Other and unspecified ovarian cyst      Rh sensitization 2016     Tetralogy of Fallot     surgical correction   Bilateral tubal ligation/mirena IUD  Ventral hernia repair, 2022          CURRENT MEDICATIONS:  Current Outpatient Medications   Medication Sig Dispense Refill     acetaminophen (TYLENOL) 325 MG tablet Take 325-650 mg by mouth       azelastine (ASTELIN) 0.1 % nasal spray        benzoyl peroxide 5 % external liquid        Blood Pressure Monitoring (BLOOD PRESSURE CUFF) MISC Please check blood pressures as needed. 1 each 0     clindamycin (CLEOCIN T) 1 % external solution        cyclobenzaprine (FLEXERIL) 5 MG tablet Take 5-10 mg by mouth       diltiazem ER COATED BEADS (CARDIZEM CD) 180 MG 24 hr capsule Take 1 capsule (180 mg) by mouth 2 times daily 180 capsule 3     levonorgestrel (MIRENA) 20 MCG/DAY IUD 1 each by Intrauterine route       lisinopril (ZESTRIL) 10 MG tablet Take 1 tablet (10 mg) by mouth daily 90 tablet 3     RETIN-A 0.025 % external cream        vitamin D2 (ERGOCALCIFEROL) 52534 units (1250 mcg) capsule          PAST SURGICAL HISTORY:  Past Surgical History:   Procedure Laterality Date     CARDIAC SURGERY      x 3 @ ages , 11 months, 13 years      SECTION      x 2     CHOLECYSTECTOMY, LAPOROSCOPIC      Cholecystectomy, Laparoscopic       ALLERGIES  Blood transfusion related (informational only), Ketorolac, and Ferumoxytol    FAMILY HX:  Family History   Problem Relation Age of Onset     Diabetes Maternal Grandmother      Hypertension Father      Hypertension Sister      Multiple Sclerosis Mother   "    Cerebrovascular Disease Mother      Coronary Artery Disease No family hx of      Depression No family hx of      Anxiety Disorder No family hx of      Anesthesia Reaction No family hx of      Cancer No family hx of      Thyroid Disease No family hx of      Glaucoma No family hx of      Macular Degeneration No family hx of        SOCIAL HX:  4 children ages 3-12 years, 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          VITAL SIGNS:  /88 (BP Location: Right arm, Patient Position: Sitting, Cuff Size: Adult Regular)   Pulse 69   Ht 1.66 m (5' 5.35\")   Wt 99.3 kg (218 lb 14.4 oz)   SpO2 97%   BMI 36.03 kg/m    Body mass index is 36.03 kg/m .  Wt Readings from Last 2 Encounters:   12/09/22 99.3 kg (218 lb 14.4 oz)   11/16/22 100.4 kg (221 lb 5.5 oz)   General: appears comfortable, alert and articulate  Neck: no adenopathy, 2+ carotids without bruits  Heart: Normal S1 and widened splitting of S2.    SOFT SM, louder DM grade 3. No rub, or gallop.    Lungs: clear, no rales or wheezing  Abdomen: soft, non-tender  Extremities: trace pitting edema both LE to mid chin  Neuro: no motor deficits       LABS  WNL 9/9/22      EKG:NSR 69 bpm  RBBB QRS duration 140 msec      Ziopatch Nov 21 symptomatic PAC/PVC possible EAT.     Echo 2/10/17  Patient after transannular patch repair for tetralogy of Fallot. There is no  residual ventricular level shunt. There is mild to moderate right ventricular  enlargement. Normal right ventricular " systolic function. Normal left ventricular systolic function with a calculated biplane left ventricular ejection fraction of 60-65%. Trivial tricuspid valve insufficiency. Estimated right ventricular systolic pressure is 25-30 mmHg plus right atrial pressure. There is mild flow acceleration across the pulmonary valve with a peak gradient across the pulmonary valve of 15-20 mmHg. Moderate to severe (3-4+) pulmonary valve insufficiency. There is mild dilation of the aortic root at the level of the sinuses of Valsalva.    CMR Report 5-  Clinical history: 31 F Tetralogy of Fallot repair, severe PI  Comparison CMR: 2020 done at [facility]  1. The LV is normal in cavity size and wall thickness. The global systolic function is normal. The LVEF is 60%. There are no regional wall motion abnormalities. There is no residual VSD  2. The RV is moderately dilated in cavity size. The global systolic function is normal. The RVEF is 53%.   3. Both atria are normal in size. There is no ASD.   4. There is moderate-severe PI (RV 45 ml, RF 40%) and mild PS (peak gr 16 mmHg).   5. Late gadolinium enhancement imaging shows no MI, fibrosis or infiltrative disease.   6. There is no pericardial effusion or thickening.  7. There is no intracardiac thrombus.  MRA Aorta  1. The aortic root is overriding and mildly dilated. The ascending aorta, transverse arch and descending thoracic aorta are normal in size without an aneurysm or dissection.  2. The aortic arch is left sided. There is normal branching of the arch vessels. There is no coarctation.  3. The main PA is mildly narrowed. The proximal branch pulmonary arteries are normal in size.   4. The systemic venous connections are normal.   5. Normal pulmonary venous anatomy with all pulmonary veins draining to the left atrium.  CONCLUSIONS: Repaired Tetralogy of Fallot, moderate-severe PI (RV 45 ml, RF 40%) and mild PS (peak gr 16mmHg), moderately dilated RV with normal function (RV  EDVI 120 ml/m2, RVEF 53%). Mildly dilated aortic root measuring 4.1 cm. No significant change compared to study from 2020.          ASSESSMENT AND PLAN:  32 year old female with history of surgically repaired tetralogy of Fallot. Increased palpitations/edema since Nov 2020. Wide open PI and right atrial enlargement. RVE at 120 ml/m2 last MRA. Normal LV and RV function by MRI. Hypertension, better controlled on lisinopril 10 mg daily. Some symptomatic improvement on diltiazem. Still with symptomatic palpitations and trace edema    Recommendations:  Start hydrochlorothiazide 12.5 mg daily  BMP in 2 weeks  Continue diltiazem 180 mg daily  Zio patch Holter prior to next visit  F/U [facility] March 2022  F/U [facility] cardiology March 2022 with echo and zio patch Holter prior  Call if new or worsening symptoms        A total of 30 minutes were spent in personal review of testing, including MRI, stress rojas and labs,  review of documented history and interval events in chart, face to face visit with discussion of findings and plan, and documentation.     Neil Wilkins M.D.   of Pediatrics  Pediatric and Adult Congenital Cardiology  [facility]  Pediatric Cardiology Office [phone]  Adult Congenital Cardiology Triage and Scheduling [phone]  Answers for HPI/ROS submitted by the patient on 12/9/2022  General Symptoms: No  Skin Symptoms: Yes  HENT Symptoms: No  EYE SYMPTOMS: No  HEART SYMPTOMS: Yes  LUNG SYMPTOMS: No  INTESTINAL SYMPTOMS: No  URINARY SYMPTOMS: No  GYNECOLOGIC SYMPTOMS: No  BREAST SYMPTOMS: No  SKELETAL SYMPTOMS: Yes  BLOOD SYMPTOMS: No  NERVOUS SYSTEM SYMPTOMS: Yes  MENTAL HEALTH SYMPTOMS: No  Changes in hair: Yes  Changes in moles/birth marks: No  Itching: Yes  Changes in nails: No  Acne: No  Hair in places you don't want it: No  Change in facial hair: No  Warts: No  Non-healing sores: No  Scarring:  No  Flaking of skin: No  Color changes of hands/feet in cold : No  Sun sensitivity: No  Skin thickening: No  Chest pain or pressure: Yes  Fast or irregular heartbeat: Yes  Pain in legs with walking: No  Trouble breathing while lying down: Yes  Fingers or toes appear blue: No  High blood pressure: Yes  Low blood pressure: No  Fainting: No  Murmurs: Yes  Pacemaker: No  Varicose veins: No  Edema or swelling: No  Wake up at night with shortness of breath: No  Light-headedness: Yes  Exercise intolerance: No  Back pain: Yes  Muscle aches: Yes  Neck pain: Yes  Swollen joints: No  Joint pain: No  Bone pain: Yes  Muscle cramps: No  Muscle weakness: No  Joint stiffness: No  Bone fracture: No  Trouble with coordination: No  Fainting or black-out spells: No  Memory loss: No  Headache: Yes  Seizures: No  Speech problems: No  Tingling: Yes  Tremor: No  Weakness: No  Difficulty walking: No  Paralysis: No  Numbness: Yes       (3) no apparent problem

## 2022-12-12 LAB
ATRIAL RATE - MUSE: 69 BPM
DIASTOLIC BLOOD PRESSURE - MUSE: NORMAL MMHG
INTERPRETATION ECG - MUSE: NORMAL
P AXIS - MUSE: -11 DEGREES
PR INTERVAL - MUSE: 126 MS
QRS DURATION - MUSE: 136 MS
QT - MUSE: 428 MS
QTC - MUSE: 458 MS
R AXIS - MUSE: 90 DEGREES
SYSTOLIC BLOOD PRESSURE - MUSE: NORMAL MMHG
T AXIS - MUSE: 69 DEGREES
VENTRICULAR RATE- MUSE: 69 BPM

## 2022-12-26 ENCOUNTER — MYC MEDICAL ADVICE (OUTPATIENT)
Dept: CARDIOLOGY | Facility: CLINIC | Age: 32
End: 2022-12-26

## 2022-12-28 ENCOUNTER — LAB (OUTPATIENT)
Dept: LAB | Facility: CLINIC | Age: 32
End: 2022-12-28
Payer: COMMERCIAL

## 2022-12-28 DIAGNOSIS — I10 ESSENTIAL HYPERTENSION: ICD-10-CM

## 2022-12-28 DIAGNOSIS — I37.1 PULMONARY VALVE INSUFFICIENCY, UNSPECIFIED ETIOLOGY: ICD-10-CM

## 2022-12-28 DIAGNOSIS — Q21.3 TETRALOGY OF FALLOT: ICD-10-CM

## 2022-12-28 DIAGNOSIS — I51.7 ENLARGED RV (RIGHT VENTRICLE): ICD-10-CM

## 2022-12-28 LAB
ANION GAP SERPL CALCULATED.3IONS-SCNC: 10 MMOL/L (ref 7–15)
BUN SERPL-MCNC: 6.6 MG/DL (ref 6–20)
CALCIUM SERPL-MCNC: 10 MG/DL (ref 8.6–10)
CHLORIDE SERPL-SCNC: 105 MMOL/L (ref 98–107)
CREAT SERPL-MCNC: 0.78 MG/DL (ref 0.51–0.95)
DEPRECATED HCO3 PLAS-SCNC: 24 MMOL/L (ref 22–29)
GFR SERPL CREATININE-BSD FRML MDRD: >90 ML/MIN/1.73M2
GLUCOSE SERPL-MCNC: 83 MG/DL (ref 70–99)
POTASSIUM SERPL-SCNC: 4.2 MMOL/L (ref 3.4–5.3)
SODIUM SERPL-SCNC: 139 MMOL/L (ref 136–145)

## 2022-12-28 PROCEDURE — 36415 COLL VENOUS BLD VENIPUNCTURE: CPT | Performed by: PATHOLOGY

## 2022-12-28 PROCEDURE — 80048 BASIC METABOLIC PNL TOTAL CA: CPT | Performed by: PATHOLOGY

## 2023-01-06 ENCOUNTER — MYC MEDICAL ADVICE (OUTPATIENT)
Dept: CARDIOLOGY | Facility: CLINIC | Age: 33
End: 2023-01-06

## 2023-01-06 DIAGNOSIS — I10 BENIGN ESSENTIAL HYPERTENSION: ICD-10-CM

## 2023-01-06 DIAGNOSIS — Q21.3 TETRALOGY OF FALLOT: ICD-10-CM

## 2023-01-06 RX ORDER — LISINOPRIL 20 MG/1
20 TABLET ORAL DAILY
Qty: 90 TABLET | Refills: 3 | Status: SHIPPED | OUTPATIENT
Start: 2023-01-06 | End: 2023-08-31

## 2023-01-06 NOTE — TELEPHONE ENCOUNTER
Date: 1/6/2023    Time of Call: 3:53 PM     Diagnosis: htn      [ TORB ] Ordering provider: Neil Wilkins MD  Order: stop hydrocholorthiazide   Increase lisinopril to 20 mg daily     Order received by: Sadie Hurd RN      Follow-up/additional notes: updated patient

## 2023-01-22 ENCOUNTER — HEALTH MAINTENANCE LETTER (OUTPATIENT)
Age: 33
End: 2023-01-22

## 2023-03-24 ENCOUNTER — OFFICE VISIT (OUTPATIENT)
Dept: CARDIOLOGY | Facility: CLINIC | Age: 33
End: 2023-03-24
Payer: COMMERCIAL

## 2023-03-24 ENCOUNTER — ANCILLARY PROCEDURE (OUTPATIENT)
Dept: CARDIOLOGY | Facility: CLINIC | Age: 33
End: 2023-03-24
Payer: COMMERCIAL

## 2023-03-24 VITALS
HEIGHT: 67 IN | OXYGEN SATURATION: 100 % | BODY MASS INDEX: 33.01 KG/M2 | HEART RATE: 71 BPM | WEIGHT: 210.3 LBS | SYSTOLIC BLOOD PRESSURE: 127 MMHG | DIASTOLIC BLOOD PRESSURE: 88 MMHG

## 2023-03-24 VITALS
SYSTOLIC BLOOD PRESSURE: 127 MMHG | BODY MASS INDEX: 33.01 KG/M2 | DIASTOLIC BLOOD PRESSURE: 88 MMHG | HEIGHT: 67 IN | OXYGEN SATURATION: 100 % | WEIGHT: 210.3 LBS | HEART RATE: 71 BPM

## 2023-03-24 DIAGNOSIS — I10 ESSENTIAL HYPERTENSION: ICD-10-CM

## 2023-03-24 DIAGNOSIS — I51.7 ENLARGED RV (RIGHT VENTRICLE): ICD-10-CM

## 2023-03-24 DIAGNOSIS — I37.1 PULMONARY VALVE INSUFFICIENCY, UNSPECIFIED ETIOLOGY: ICD-10-CM

## 2023-03-24 DIAGNOSIS — Q21.3 TETRALOGY OF FALLOT: ICD-10-CM

## 2023-03-24 DIAGNOSIS — Q21.3 TOF (TETRALOGY OF FALLOT): ICD-10-CM

## 2023-03-24 DIAGNOSIS — Z13.6 CARDIOVASCULAR SCREENING; LDL GOAL LESS THAN 160: ICD-10-CM

## 2023-03-24 PROCEDURE — 93005 ELECTROCARDIOGRAM TRACING: CPT

## 2023-03-24 PROCEDURE — 99214 OFFICE O/P EST MOD 30 MIN: CPT | Mod: 25

## 2023-03-24 PROCEDURE — 93320 DOPPLER ECHO COMPLETE: CPT | Performed by: PEDIATRICS

## 2023-03-24 PROCEDURE — 93303 ECHO TRANSTHORACIC: CPT | Performed by: PEDIATRICS

## 2023-03-24 PROCEDURE — 99213 OFFICE O/P EST LOW 20 MIN: CPT | Mod: 25 | Performed by: NURSE PRACTITIONER

## 2023-03-24 PROCEDURE — 93325 DOPPLER ECHO COLOR FLOW MAPG: CPT | Performed by: PEDIATRICS

## 2023-03-24 PROCEDURE — G0463 HOSPITAL OUTPT CLINIC VISIT: HCPCS | Mod: 25 | Performed by: NURSE PRACTITIONER

## 2023-03-24 RX ORDER — ALBUTEROL SULFATE 90 UG/1
AEROSOL, METERED RESPIRATORY (INHALATION)
COMMUNITY
Start: 2023-02-21

## 2023-03-24 ASSESSMENT — ENCOUNTER SYMPTOMS
SKIN CHANGES: 0
SMELL DISTURBANCE: 0
TROUBLE SWALLOWING: 0
SMELL DISTURBANCE: 0
MUSCLE WEAKNESS: 0
SKIN CHANGES: 0
SORE THROAT: 0
PALPITATIONS: 1
BACK PAIN: 0
JOINT SWELLING: 0
HYPERTENSION: 0
STIFFNESS: 0
LIGHT-HEADEDNESS: 0
ARTHRALGIAS: 0
PALPITATIONS: 1
NAIL CHANGES: 0
COUGH DISTURBING SLEEP: 0
NECK PAIN: 0
MUSCLE CRAMPS: 0
TROUBLE SWALLOWING: 0
SNORES LOUDLY: 0
LIGHT-HEADEDNESS: 0
STIFFNESS: 0
SYNCOPE: 0
NECK MASS: 0
SNORES LOUDLY: 0
HYPERTENSION: 0
DYSPNEA ON EXERTION: 0
SYNCOPE: 0
POSTURAL DYSPNEA: 0
ORTHOPNEA: 0
HYPOTENSION: 0
MYALGIAS: 1
BACK PAIN: 0
COUGH: 1
EXERCISE INTOLERANCE: 0
NAIL CHANGES: 0
HOARSE VOICE: 0
LEG PAIN: 0
NECK MASS: 0
HYPOTENSION: 0
SORE THROAT: 0
SINUS CONGESTION: 1
ARTHRALGIAS: 0
ORTHOPNEA: 0
NECK PAIN: 0
COUGH: 1
MUSCLE WEAKNESS: 0
WHEEZING: 0
SHORTNESS OF BREATH: 0
EXERCISE INTOLERANCE: 0
SHORTNESS OF BREATH: 0
LEG PAIN: 0
SLEEP DISTURBANCES DUE TO BREATHING: 0
HEMOPTYSIS: 0
SPUTUM PRODUCTION: 0
JOINT SWELLING: 0
SLEEP DISTURBANCES DUE TO BREATHING: 0
SINUS PAIN: 0
POSTURAL DYSPNEA: 0
SINUS PAIN: 0
MYALGIAS: 1
DYSPNEA ON EXERTION: 0
SINUS CONGESTION: 1
WHEEZING: 0
TASTE DISTURBANCE: 0
HEMOPTYSIS: 0
TASTE DISTURBANCE: 0
SPUTUM PRODUCTION: 0
COUGH DISTURBING SLEEP: 0
MUSCLE CRAMPS: 0
HOARSE VOICE: 0

## 2023-03-24 NOTE — PATIENT INSTRUCTIONS
You were seen today in the Adult Congenital and Cardiovascular Genetics Clinic at the [facility].    Cardiology Providers you saw during your visit:  Neil Wilkins MD and KATE Nathan    Diagnosis: TOF    Results:  Neil Wilkins MD and KATE Nathan reviewed the results of your EKG and echo testing today in clinic.    Recommendations for you:    No changes      General Cardiac Recommendations:  Continue to eat a heart healthy, low salt diet.  Continue to get 20-30 minutes of aerobic activity, 4-5 days per week.  Examples of aerobic activity include walking, running, swimming, cycling, etc.  Continue to observe good oral hygiene, with regular dental visits.      SBE prophylaxis:   Yes__X__  No____    If YES is checked, follow the recommendations outlined below:  Take antibiotic(s) prior to recommended dental procedures and procedures on the respiratory tract or with infected skin, muscle or bones. SBE prophylaxis is not needed for routine GI and  procedures (ie. Colonoscopy or vaginal delivery)  Observe good oral hygiene daily, as advised by your dentist. Get regular professional dental care.  Keep cuts clean.  Infections should be treated promptly.  Symptoms of Infective Endocarditis could include: fever lasting more than 4-5 days or a recurrent fever that initially resolves but returns within 1-2 days)      Exercise restrictions:   Yes__X__  No____         If yes, list restrictions:  Must be allowed to rest if fatigued or SOB      FASTING CHOLESTEROL was checked in the last 5 years YES_X__  NO___ (2022)  If no, please follow up with your primary care physician. You should have a cholesterol screening every 5 years.      Follow-up: Follow up with Dr. Wilkins and KATE Nathan this summer with a cardiac MRI/MRA and CPX prior.    If you have questions or concerns please contact us at:    Elodia Law, MPH, RN, BSN   Sindi Méndez (Scheduling)  Nurse Care Coordinator     Clinic Administrative  Coordinator  Adult Congenital and CV Genetics   Adult Congenital and CV Genetic  [facility]   [facility]  (P) [phone]     (P) [phone]  [email or web address]  (F) [phone]        For after hours urgent needs, call [phone] and ask to speak to the Adult Congenital Physician on call.  Mention Job Code 0401.    For emergencies call 911.    [facility]  [facility]  Mail Code [id number]  [address] [place]  [address]

## 2023-03-24 NOTE — PROGRESS NOTES
"Adult Congenital Cardiology Clinic Visit  HPI: Brittani Gaviria is a 32 year old female with history of surgically repaired tetralogy of fallot who presents for ongoing evaluation and management.  Pt reports that in general she is feeling a little better than when she was here in . She is on 180 mg diltiazem daily and feels better on that. She has palpitations several times daily with a feeling of fullness and pressure in her head. Her edema is improved, she notices it rarely.  She denies any chest pain or pressure, sob/torrez, orthopnea, pnd, syncope/presyncope or significant change in exercise tolerance. She does stay home from work about 2 times weekly due to frequent palpitations and head discomfort.    Last MRA  with 40% PI fraction and RVEDV 120 ml/m2.   Stress test with approx 60% expected VO2, hypertensive response to exercise.    Cardiac History: Pt reports that she was born in [place] at term and was delivered via .  She was diagnosed with TOF shortly after birth but :had to wait to grow\" before intervention.  She reports that she underwent complete surgical repair at 11 months of age.  She then underwent a \"valve repair\" at age and then \"valve replacement\" at 13 years of age.  The first two procedures were performed at [facility] which is now closed. The last was performed at [facility] in [place], [place].  She has never been anticoagulated.  She does not recall any significant cardiac limitations growing up.  She may have not a little less energy/exercise tolerance but this was mild.  She denies having any special limitations or restriction.  The patient followed up at [facility] until she was 17 then at [facility] in [place] (age 17 - 21) and [place], [place].  Her first pregnancy at age 20 was uncomplicated.  The second pregnancy at age 21 was also uncomplicated.  The third pregnancy at age 24 resulted in miscarriage at 13 weeks.  Fourth pregnancy " delivered at [facility] via planned  due to prior .    Clinic visit 2023:  No ED visits or hohospitalizations since her last visit.  Patient denies any chest pain or shortness of breath.   Her palpitations have significantly improved. She states, she is at 85% back to normal now. She experiences palpitations 4 to 5 times a moth. It is not as life-limiting this was before. Patient exercises 5 times a week for about 30 minutes a day. She stopped drinking alcohol. She does not smoke.  She is compliant with her cardiac medications.    PAST MEDICAL HISTORY:  Past Medical History:   Diagnosis Date     Carpal tunnel syndrome     right, conservative management      Chlamydia      Cholelithiasis     resolved     Hyperparathyroidism (H) 2016     Iron deficiency anemia 2015     Kidney stones      Migraines      Other and unspecified ovarian cyst      Rh sensitization 2016     Tetralogy of Fallot     surgical correction   Bilateral tubal ligation/mirena IUD  Ventral hernia repair, 2022    CURRENT MEDICATIONS:  Current Outpatient Medications   Medication Sig Dispense Refill     acetaminophen (TYLENOL) 325 MG tablet Take 325-650 mg by mouth       azelastine (ASTELIN) 0.1 % nasal spray        benzoyl peroxide 5 % external liquid        Blood Pressure Monitoring (BLOOD PRESSURE CUFF) MISC Please check blood pressures as needed. 1 each 0     clindamycin (CLEOCIN T) 1 % external solution        cyclobenzaprine (FLEXERIL) 5 MG tablet Take 5-10 mg by mouth       diltiazem ER COATED BEADS (CARDIZEM CD) 180 MG 24 hr capsule Take 1 capsule (180 mg) by mouth 2 times daily 180 capsule 3     levonorgestrel (MIRENA) 20 MCG/DAY IUD 1 each by Intrauterine route       lisinopril (ZESTRIL) 20 MG tablet Take 1 tablet (20 mg) by mouth daily 90 tablet 3     RETIN-A 0.025 % external cream        vitamin D2 (ERGOCALCIFEROL) 45411 units (1250 mcg) capsule          PAST SURGICAL HISTORY:  Past  Surgical History:   Procedure Laterality Date     CARDIAC SURGERY      x 3 @ ages , 11 months, 13 years      SECTION      x 2     CHOLECYSTECTOMY, LAPOROSCOPIC  2014    Cholecystectomy, Laparoscopic       ALLERGIES  Blood transfusion related (informational only), Ketorolac, and Ferumoxytol    FAMILY HX:  Family History   Problem Relation Age of Onset     Diabetes Maternal Grandmother      Hypertension Father      Hypertension Sister      Multiple Sclerosis Mother      Cerebrovascular Disease Mother      Coronary Artery Disease No family hx of      Depression No family hx of      Anxiety Disorder No family hx of      Anesthesia Reaction No family hx of      Cancer No family hx of      Thyroid Disease No family hx of      Glaucoma No family hx of      Macular Degeneration No family hx of        SOCIAL HX:  4 children ages 3-12 years, works in [employer]. 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          VITAL SIGNS:  There were no vitals taken for this visit.  There is no height or weight on file to calculate BMI.  Wt Readings from Last 2 Encounters:   22 99.3 kg (218 lb 14.4 oz)   22 100.4 kg (221 lb 5.5 oz)     General: appears comfortable, alert and articulate  Neck: no adenopathy, 2+ carotids without bruits  Heart: Normal S1 and loud S2. 3/6 diastolic murmur at the LUSB.    Lungs: clear, no rales or wheezing  Abdomen: soft, non-tender  Extremities: trace pitting edema both LE to mid chin  Neuro: no motor deficits      LABS  WNL 12/28/22    EKG:NSR 69 bpm  RBBB QRS duration 140 msec    [place] Nov 21 symptomatic PAC/PVC possible EAT.     Echo 2/10/17  Patient after transannular patch repair for tetralogy of Fallot. There is no  residual ventricular level shunt. There is mild to moderate right ventricular  enlargement. Normal right ventricular systolic function. Normal left ventricular systolic function with a calculated biplane left ventricular ejection fraction of 60-65%. Trivial tricuspid valve insufficiency. Estimated right ventricular systolic pressure is 25-30 mmHg plus right atrial pressure. There is mild flow acceleration across the pulmonary valve with a peak gradient across the pulmonary valve of 15-20 mmHg. Moderate to severe (3-4+) pulmonary valve insufficiency. There is mild dilation of the aortic root at the level of the sinuses of Valsalva.    CMR Report 5-  Clinical history: 31 F Tetralogy of Fallot repair, severe PI  Comparison CMR: 2020 done at [facility]  1. The LV is normal in cavity size and wall thickness. The global systolic function is normal. The LVEF is 60%. There are no regional wall motion abnormalities. There is no residual VSD  2. The RV is moderately dilated in cavity size. The global systolic function is normal. The RVEF is 53%.   3. Both atria are normal in size. There is no ASD.   4. There is moderate-severe PI (RV 45 ml, RF 40%) and mild PS (peak gr 16 mmHg).   5. Late gadolinium enhancement imaging shows no MI, fibrosis or infiltrative disease.   6. There is no pericardial effusion or thickening.  7. There is no intracardiac thrombus.  MRA Aorta  1. The aortic root is overriding and mildly dilated. The ascending aorta, transverse arch and descending thoracic aorta are normal in size without an aneurysm or dissection.  2. The aortic arch is left sided. There is normal branching of the arch vessels. There is no coarctation.  3. The main PA is mildly narrowed. The proximal branch  pulmonary arteries are normal in size.   4. The systemic venous connections are normal.   5. Normal pulmonary venous anatomy with all pulmonary veins draining to the left atrium.  CONCLUSIONS: Repaired Tetralogy of Fallot, moderate-severe PI (RV 45 ml, RF 40%) and mild PS (peak gr 16mmHg), moderately dilated RV with normal function (RV EDVI 120 ml/m2, RVEF 53%). Mildly dilated aortic root measuring 4.1 cm. No significant change compared to study from 2020.      Echocardiogram 3/24/2023:  Patient after transannular patch repair for tetralogy of Fallot. There is no  residual ventricular level shunt. There is mild to moderate right ventricular  enlargement. Normal right ventricular systolic function. Qualitatively normal  left ventricular systolic function. Trivial tricuspid valve insufficiency.  There is mild flow acceleration across the pulmonary valve with a peak  gradient across the pulmonary valve of 22 mmHg. Moderate to severe (3-4+)  pulmonary valve insufficiency. There is mild dilation of the aortic root at  the level of the sinuses of Valsalva.      ASSESSMENT AND PLAN:  32 year old female with history of surgically repaired tetralogy of Fallot. Wide open PI and right atrial enlargement. RVE at 120 ml/m2 last MRA. Normal LV and RV function by MRI. Hypertension, better controlled on lisinopril 10 mg daily. Palpitations have significantly improved and are well controlled on Diltiazem  mg daily. She has 4 to 5 episodes of palpitations per months.    Recommendations:  Continue Lisinopril 10 mg daily.   Continue diltiazem 180 mg daily  Cardiac MRI/MRA and CPX prior to next appointment.   Continue to follow EP.  Continue to eat a heart healthy, low salt diet.  Continue to get 20-30 minutes of aerobic activity, 4-5 days per week.  Examples of aerobic activity include walking, running, swimming, cycling, etc.  Continue to observe good oral hygiene, with regular dental visits    F/U [facility] cardiology in the summer  with cardiac MRI/MRA and CPX prior.  Call if new or worsening symptoms    Patient was seen and discussed with Dr. Wilkins.    Rubi Ruiz MD  Cardiology fellow    I, Neil Wilkins MD, saw this patient with the fellow and agree with the  findings and plan of care as documented in this note.I have reviewed this patient's history, examined the patient and reviewed relevant laboratory findings and diagnostic testing. I have discussed the plan of care with the patient at the time of this visit.     Neil Wilkins M.D.   of Pediatrics  Pediatric and Adult Congenital Cardiology  [facility]  Pediatric Cardiology Office [phone]  Adult Congenital Cardiology Triage and Scheduling [phone]    A total of 30 minutes were spent in personal review of testing, review of documented history and interval events in chart, and face to face visit with discussion of findings and plan.

## 2023-03-24 NOTE — LETTER
"3/24/2023       RE: Brittani Gaviria  [address]  [facility] [address]     Dear Colleague,    Thank you for referring your patient, Brittani Gaviria, to the [facility] HEART CLINIC at [facility]. Please see a copy of my visit note below.    Adult Congenital Cardiology Clinic Visit  HPI: Brittani Gaviria is a 32 year old female with history of surgically repaired tetralogy of fallot who presents for ongoing evaluation and management.  Pt reports that in general she is feeling a little better than when she was here in . She is on 180 mg diltiazem daily and feels better on that. She has palpitations several times daily with a feeling of fullness and pressure in her head. Her edema is improved, she notices it rarely.  She denies any chest pain or pressure, sob/torrez, orthopnea, pnd, syncope/presyncope or significant change in exercise tolerance. She does stay home from work about 2 times weekly due to frequent palpitations and head discomfort.    Last MRA  with 40% PI fraction and RVEDV 120 ml/m2.   Stress test with approx 60% expected VO2, hypertensive response to exercise.    Cardiac History: Pt reports that she was born in [place] at term and was delivered via .  She was diagnosed with TOF shortly after birth but :had to wait to grow\" before intervention.  She reports that she underwent complete surgical repair at 11 months of age.  She then underwent a \"valve repair\" at age and then \"valve replacement\" at 13 years of age.  The first two procedures were performed at [facility] which is now closed. The last was performed at [facility] in [place], [place].  She has never been anticoagulated.  She does not recall any significant cardiac limitations growing up.  She may have not a little less energy/exercise tolerance but this was mild.  She denies having any special limitations or restriction.  The patient followed " up at [facility] until she was 17 then at [facility] in [place] (age 17 - 21) and [place], [place].  Her first pregnancy at age 20 was uncomplicated.  The second pregnancy at age 21 was also uncomplicated.  The third pregnancy at age 24 resulted in miscarriage at 13 weeks.  Fourth pregnancy delivered at [facility] via planned  due to prior .    Clinic visit 2023:  No ED visits or hohospitalizations since her last visit.  Patient denies any chest pain or shortness of breath.   Her palpitations have significantly improved. She states, she is at 85% back to normal now. She experiences palpitations 4 to 5 times a moth. It is not as life-limiting this was before. Patient exercises 5 times a week for about 30 minutes a day. She stopped drinking alcohol. She does not smoke.  She is compliant with her cardiac medications.    PAST MEDICAL HISTORY:  Past Medical History:   Diagnosis Date     Carpal tunnel syndrome     right, conservative management      Chlamydia      Cholelithiasis     resolved     Hyperparathyroidism (H) 2016     Iron deficiency anemia      Kidney stones      Migraines      Other and unspecified ovarian cyst      Rh sensitization 2016     Tetralogy of Fallot     surgical correction   Bilateral tubal ligation/mirena IUD  Ventral hernia repair, 2022    CURRENT MEDICATIONS:  Current Outpatient Medications   Medication Sig Dispense Refill     acetaminophen (TYLENOL) 325 MG tablet Take 325-650 mg by mouth       azelastine (ASTELIN) 0.1 % nasal spray        benzoyl peroxide 5 % external liquid        Blood Pressure Monitoring (BLOOD PRESSURE CUFF) MISC Please check blood pressures as needed. 1 each 0     clindamycin (CLEOCIN T) 1 % external solution        cyclobenzaprine (FLEXERIL) 5 MG tablet Take 5-10 mg by mouth       diltiazem ER COATED BEADS (CARDIZEM CD) 180 MG 24 hr capsule Take 1 capsule (180 mg) by mouth 2 times daily 180 capsule  3     levonorgestrel (MIRENA) 20 MCG/DAY IUD 1 each by Intrauterine route       lisinopril (ZESTRIL) 20 MG tablet Take 1 tablet (20 mg) by mouth daily 90 tablet 3     RETIN-A 0.025 % external cream        vitamin D2 (ERGOCALCIFEROL) 48405 units (1250 mcg) capsule          PAST SURGICAL HISTORY:  Past Surgical History:   Procedure Laterality Date     CARDIAC SURGERY      x 3 @ ages , 11 months, 13 years      SECTION      x 2     CHOLECYSTECTOMY, LAPOROSCOPIC  2014    Cholecystectomy, Laparoscopic       ALLERGIES  Blood transfusion related (informational only), Ketorolac, and Ferumoxytol    FAMILY HX:  Family History   Problem Relation Age of Onset     Diabetes Maternal Grandmother      Hypertension Father      Hypertension Sister      Multiple Sclerosis Mother      Cerebrovascular Disease Mother      Coronary Artery Disease No family hx of      Depression No family hx of      Anxiety Disorder No family hx of      Anesthesia Reaction No family hx of      Cancer No family hx of      Thyroid Disease No family hx of      Glaucoma No family hx of      Macular Degeneration No family hx of        SOCIAL HX:  4 children ages 3-12 years, works in [employer]. 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          VITAL SIGNS:  There were no vitals taken for this visit.  There is no height or weight on file to calculate BMI.  Wt Readings from Last 2 Encounters:   22 99.3 kg (218 lb 14.4 oz)   22 100.4 kg (221 lb 5.5 oz)     General:  appears comfortable, alert and articulate  Neck: no adenopathy, 2+ carotids without bruits  Heart: Normal S1 and loud S2. 3/6 diastolic murmur at the LUSB.    Lungs: clear, no rales or wheezing  Abdomen: soft, non-tender  Extremities: trace pitting edema both LE to mid chin  Neuro: no motor deficits     LABS  WNL 12/28/22    EKG:NSR 69 bpm  RBBB QRS duration 140 msec    Ziopatch Nov 21 symptomatic PAC/PVC possible EAT.     Echo 2/10/17  Patient after transannular patch repair for tetralogy of Fallot. There is no  residual ventricular level shunt. There is mild to moderate right ventricular  enlargement. Normal right ventricular systolic function. Normal left ventricular systolic function with a calculated biplane left ventricular ejection fraction of 60-65%. Trivial tricuspid valve insufficiency. Estimated right ventricular systolic pressure is 25-30 mmHg plus right atrial pressure. There is mild flow acceleration across the pulmonary valve with a peak gradient across the pulmonary valve of 15-20 mmHg. Moderate to severe (3-4+) pulmonary valve insufficiency. There is mild dilation of the aortic root at the level of the sinuses of Valsalva.    CMR Report 5-  Clinical history: 31 F Tetralogy of Fallot repair, severe PI  Comparison CMR: 2020 done at [facility]  1. The LV is normal in cavity size and wall thickness. The global systolic function is normal. The LVEF is 60%. There are no regional wall motion abnormalities. There is no residual VSD  2. The RV is moderately dilated in cavity size. The global systolic function is normal. The RVEF is 53%.   3. Both atria are normal in size. There is no ASD.   4. There is moderate-severe PI (RV 45 ml, RF 40%) and mild PS (peak gr 16 mmHg).   5. Late gadolinium enhancement imaging shows no MI, fibrosis or infiltrative disease.   6. There is no pericardial effusion or thickening.  7. There is no intracardiac thrombus.  MRA Aorta  1. The aortic root is overriding and  mildly dilated. The ascending aorta, transverse arch and descending thoracic aorta are normal in size without an aneurysm or dissection.  2. The aortic arch is left sided. There is normal branching of the arch vessels. There is no coarctation.  3. The main PA is mildly narrowed. The proximal branch pulmonary arteries are normal in size.   4. The systemic venous connections are normal.   5. Normal pulmonary venous anatomy with all pulmonary veins draining to the left atrium.  CONCLUSIONS: Repaired Tetralogy of Fallot, moderate-severe PI (RV 45 ml, RF 40%) and mild PS (peak gr 16mmHg), moderately dilated RV with normal function (RV EDVI 120 ml/m2, RVEF 53%). Mildly dilated aortic root measuring 4.1 cm. No significant change compared to study from 2020.      Echocardiogram 3/24/2023:  Patient after transannular patch repair for tetralogy of Fallot. There is no  residual ventricular level shunt. There is mild to moderate right ventricular  enlargement. Normal right ventricular systolic function. Qualitatively normal  left ventricular systolic function. Trivial tricuspid valve insufficiency.  There is mild flow acceleration across the pulmonary valve with a peak  gradient across the pulmonary valve of 22 mmHg. Moderate to severe (3-4+)  pulmonary valve insufficiency. There is mild dilation of the aortic root at  the level of the sinuses of Valsalva.      ASSESSMENT AND PLAN:  32 year old female with history of surgically repaired tetralogy of Fallot. Wide open PI and right atrial enlargement. RVE at 120 ml/m2 last MRA. Normal LV and RV function by MRI. Hypertension, better controlled on lisinopril 10 mg daily. Palpitations have significantly improved and are well controlled on Diltiazem  mg daily. She has 4 to 5 episodes of palpitations per months.    Recommendations:  Continue Lisinopril 10 mg daily.   Continue diltiazem 180 mg daily  Cardiac MRI/MRA and CPX prior to next appointment.   Continue to follow  EP.  Continue to eat a heart healthy, low salt diet.  Continue to get 20-30 minutes of aerobic activity, 4-5 days per week.  Examples of aerobic activity include walking, running, swimming, cycling, etc.  Continue to observe good oral hygiene, with regular dental visits    F/U [facility] cardiology in the summer with cardiac MRI/MRA and CPX prior.  Call if new or worsening symptoms    Patient was seen and discussed with Dr. Wilkins.    Rubi Ruiz MD  Cardiology fellow    I, Neil Wilkins MD, saw this patient with the fellow and agree with the  findings and plan of care as documented in this note.I have reviewed this patient's history, examined the patient and reviewed relevant laboratory findings and diagnostic testing. I have discussed the plan of care with the patient at the time of this visit.     Neil Wilkins M.D.   of Pediatrics  Pediatric and Adult Congenital Cardiology  [facility]  Pediatric Cardiology Office [phone]  Adult Congenital Cardiology Triage and Scheduling [phone]    A total of 30 minutes were spent in personal review of testing, review of documented history and interval events in chart, and face to face visit with discussion of findings and plan.           Again, thank you for allowing me to participate in the care of your patient.      Sincerely,    Neil Wilkins MD

## 2023-03-24 NOTE — LETTER
3/24/2023      RE: Brittani Gaviria  [address]  [place] [address]       Dear Colleague,    Thank you for the opportunity to participate in the care of your patient, Brittani Gaviria, at the [facility] HEART CLINIC [place] at [facility]. Please see a copy of my visit note below.        [facility] ELECTROPHYSIOLOGY CLINIC VISIT    Assessment/Recommendations   Assessment/Plan:      Ms. Gaviria is a 32 year old female who has a past medical history significant for TOF s/p surgical repair at 11 months, s/p pulmonary valve repair at 7 years, s/p pulmonary valve replacement at age 13 years, and palpitations.     Palpitations:   Symptoms have improved with Diltiazem 180 mg CD daily, cutting out caffeine, weight loss and exercise. Continued to encourage her with these efforts. She is seeing Dr. Wilkins today for continued evaluations as well.     Follow up in Summer 2023.         History of Present Illness/Subjective    Ms. Brittani Gaviria is a 32 year old female who comes in today for EP consultation of palpitations.    Ms. Gaviria is a 32 year old female who has a past medical history significant for TOF s/p surgical repair at 11 months, s/p pulmonary valve repair at 7 years, s/p pulmonary valve replacement at age 13 years, and palpitations.     She was born with TOF and underwent repair at 11 months. She required pulmonary valve repair at age 7 and the a pulmonary valve replacement at 13 years of age. Her last CMR at OSH showed normal biventricular function, severe RV dilation, moderate LV dilation, and moderate pulmonary valve regurgitation. She began developing palpitations. She had an episode in 11/2021 with palpitations and dizziness and had to sit down. She was evaluated at OSH and ECG and echo were stable and no intervention was done. Her symptoms continued and she was seen again. A zio patch from OSH in 1/2022 reported to show 1 NSVT 6 beats, 45 PSVT up  to 7.2 seconds, frequent PACs 14.4%, and rare isolated PVCs. 56 symptom activations reportedly corresponded to PAC/PVCs. She was started on Metoprolol. She had some weird hallucinations and metoprolol was lowered and this subsided. She started having some chest pains and metoprolol was decreased further but symptoms continued. Metoprolol was stopped in 4/5/22 due to this.     EP Visit 5/6/22: She reports feeling OK. She has noted her COOPER has progressed over last year. She continues to have palpitations many times a day for a minute or less. She denies chest discomfort, palpitations, abdominal fullness/bloating or peripheral edema, shortness of breath, paroxysmal nocturnal dyspnea, orthopnea, lightheadedness, dizziness, pre-syncope, or syncope. Presenting 12 lead ECG shows SR with PACs IVCD Vent Rate 73 bpm,  ms,  ms, QTc 464 ms. No current cardiac medications. She was started on Diltiazem at this visit.     EP Visit 6/3/22: She presents today for follow up. She reports feeling improved symptoms control with diltiazem. She still has some residual palpitations but much improved. She denies chest discomfort, abdominal fullness/bloating or peripheral edema, shortness of breath, paroxysmal nocturnal dyspnea, orthopnea, lightheadedness, dizziness, pre-syncope, or syncope. Current cardiac medication: Diltiazem.     She presents today for follow up. She reports feeling improved since we last saw her. She feels symptoms >80% improved. She still has some episodes about 4-5 times a month of short palpitations with chest pressure and lightheadedness. She states she cut out caffeine, lost weight, increased exercise and with the diltiazem feels her symptoms have improved. She is getting her IUD out to see if this will help improve some of her remaining symptoms. She denies chest discomfort, palpitations, abdominal fullness/bloating or peripheral edema, shortness of breath, paroxysmal nocturnal dyspnea, orthopnea,  lightheadedness, dizziness, pre-syncope, or syncope. Presenting 12 lead ECG shows NSR Vent Rate 67 bpm,  ms,  ms, QTc 452 ms. Current cardiac medication: Diltiazem and Lisinopril.     I have reviewed and updated the patient's Past Medical History, Social History, Family History and Medication List.     Cardiographics (Personally Reviewed) :   11/2021 Echo (OS Report):  CONCLUSIONS   Patient is status post repair of tetralogy of Fallot.   1. Left ventricular ejection fraction is visually estimated at 65%.   Abnormal septal motion consistent with prior open heart surgery.   2. Moderate right ventricular dilation is present. Global right   ventricular systolic function is moderately reduced.   3. Mixed pulmonary valve disease. Pulmonic stenosis. Peak velocity   through the pulmonary valve is 2.5 m/sec and a peak gradient 27 mm of   Hg..   Probably severe pulmonic regurgitation.   4. Proximal ascending aorta measures at 3.9 cm.   Compared to the prior study dated June 11, 2019 , no significant   change was noted.     2/10/17 Echo:      CONCLUSIONS  Patient after transannular patch repair for tetralogy of Fallot. There is no  residual ventricular level shunt. There is mild to moderate right ventricular  enlargement. Normal right ventricular systolic function. Normal left  ventricular systolic function with a calculated biplane left ventricular  ejection fraction of 60-65%. Trivial tricuspid valve insufficiency. Estimated  right ventricular systolic pressure is 25-30 mmHg plus right atrial pressure.  There is mild flow acceleration across the pulmonary valve with a peak  gradient across the pulmonary valve of 15-20 mmHg. Moderate to severe (3-4+)  pulmonary valve insufficiency. There is mild dilation of the aortic root at  the level of the sinuses of Valsalva.    10/30/20 CMR (OS Report):  Impression:   1. Normal left and right ventricular function, EF 55.6 and 46.9% respectively   2. Moderate left  "ventricular enlargement with severe right ventricular enlargement.   3. Overriding aortic root with mildly dilated sinus of Valsalva at 41.9 mm.   4. Moderate pulmonary regurgitation with a regurgitant volume of 44.2 mL, regurgitation fraction of 32.3%.   5. No residual VSD          Physical Examination   /88 (BP Location: Right arm, Patient Position: Sitting, Cuff Size: Adult Large)   Pulse 71   Ht 1.703 m (5' 7.05\")   Wt 95.4 kg (210 lb 4.8 oz)   SpO2 100%   BMI 32.89 kg/m    Wt Readings from Last 3 Encounters:   12/09/22 99.3 kg (218 lb 14.4 oz)   11/16/22 100.4 kg (221 lb 5.5 oz)   06/03/22 97.1 kg (214 lb 1.6 oz)     General Appearance:   Alert, well-appearing and in no acute distress.   HEENT: Atraumatic, normocephalic. PERRL.  MMM.   Chest/Lungs:   Respirations unlabored.  Lungs are clear to auscultation.   Cardiovascular:   Regular. Murmur present.    Abdomen:  Soft, nontender, nondistended.   Extremities: No cyanosis or clubbing. No edema.    Musculoskeletal: Moves all extremities.  Gait normal.   Skin: Warm, dry, intact.    Neurologic: Mood and affect are appropriate.  Alert and oriented to person, place, time, and situation.          Medications  Allergies   Current Outpatient Medications   Medication Sig Dispense Refill     acetaminophen (TYLENOL) 325 MG tablet Take 325-650 mg by mouth       azelastine (ASTELIN) 0.1 % nasal spray        benzoyl peroxide 5 % external liquid        Blood Pressure Monitoring (BLOOD PRESSURE CUFF) MISC Please check blood pressures as needed. 1 each 0     clindamycin (CLEOCIN T) 1 % external solution        cyclobenzaprine (FLEXERIL) 5 MG tablet Take 5-10 mg by mouth       diltiazem ER COATED BEADS (CARDIZEM CD) 180 MG 24 hr capsule Take 1 capsule (180 mg) by mouth 2 times daily 180 capsule 3     levonorgestrel (MIRENA) 20 MCG/DAY IUD 1 each by Intrauterine route       lisinopril (ZESTRIL) 20 MG tablet Take 1 tablet (20 mg) by mouth daily 90 tablet 3     RETIN-A " 0.025 % external cream        vitamin D2 (ERGOCALCIFEROL) 61363 units (1250 mcg) capsule       Allergies   Allergen Reactions     Blood Transfusion Related (Informational Only) Other (See Comments)     Patient has a history of a clinically significant antibody against RBC antigens.  A delay in compatible RBCs may occur.     Ketorolac Hives, Itching and Unknown     Tolerates ibuprofen without reaction       Ferumoxytol Difficulty breathing, Cramps, Hives, Itching and Muscle Pain (Myalgia)         Lab Results (Personally Reviewed)    Chemistry/lipid CBC Cardiac Enzymes/BNP/TSH/INR   Lab Results   Component Value Date    BUN 6.6 12/28/2022     12/28/2022    CO2 24 12/28/2022     Creatinine   Date Value Ref Range Status   12/28/2022 0.78 0.51 - 0.95 mg/dL Final   07/20/2016 0.74 0.52 - 1.04 mg/dL Final       Lab Results   Component Value Date    CHOL 171 05/06/2022    HDL 48 (L) 05/06/2022     (H) 05/06/2022      Lab Results   Component Value Date    WBC 12.8 (H) 11/30/2016    HGB  11/30/2016     CHANGE ORDER PER MD  CORRECTED ON 12/01 AT 0931: PREVIOUSLY REPORTED AS 8.1      HGB 8.1 (L) 11/30/2016    HCT 26.6 (L) 11/30/2016    MCV 86 11/30/2016     11/30/2016    Lab Results   Component Value Date    TSH 1.27 05/06/2022        The patient states understanding and is agreeable with the plan.   KATE Nathan CNP  Electrophysiology Consult Service  Pager: [phone]

## 2023-03-24 NOTE — PATIENT INSTRUCTIONS
You were seen today in the Adult Congenital and Cardiovascular Genetics Clinic at the [facility].    Cardiology Providers you saw during your visit:  Neil Wilkins MD and KATE Nathan    Diagnosis: TOF    Results:  Neil Wilkins MD and KATE Nathan reviewed the results of your EKG and echo testing today in clinic.    Recommendations for you:    No changes      General Cardiac Recommendations:  Continue to eat a heart healthy, low salt diet.  Continue to get 20-30 minutes of aerobic activity, 4-5 days per week.  Examples of aerobic activity include walking, running, swimming, cycling, etc.  Continue to observe good oral hygiene, with regular dental visits.      SBE prophylaxis:   Yes__X__  No____    If YES is checked, follow the recommendations outlined below:  Take antibiotic(s) prior to recommended dental procedures and procedures on the respiratory tract or with infected skin, muscle or bones. SBE prophylaxis is not needed for routine GI and  procedures (ie. Colonoscopy or vaginal delivery)  Observe good oral hygiene daily, as advised by your dentist. Get regular professional dental care.  Keep cuts clean.  Infections should be treated promptly.  Symptoms of Infective Endocarditis could include: fever lasting more than 4-5 days or a recurrent fever that initially resolves but returns within 1-2 days)      Exercise restrictions:   Yes__X__  No____         If yes, list restrictions:  Must be allowed to rest if fatigued or SOB      FASTING CHOLESTEROL was checked in the last 5 years YES_X__  NO___ (2022)  If no, please follow up with your primary care physician. You should have a cholesterol screening every 5 years.      Follow-up: Follow up with Dr. Wilkins and KATE Nathan this summer with a cardiac MRI/MRA and CPX prior.    If you have questions or concerns please contact us at:    Elodia Law, MPH, RN, BSN   Sindi Méndez (Scheduling)  Nurse Care Coordinator     Clinic Administrative  Coordinator  Adult Congenital and CV Genetics   Adult Congenital and CV Genetic  [facility]   [facility]  (P) [phone]     (P) [phone]  [email or web address]  (F) [phone]        For after hours urgent needs, call [phone] and ask to speak to the Adult Congenital Physician on call.  Mention Job Code 0401.    For emergencies call 911.    [facility]  [facility]  Mail Code [id number]  [address] [place]  [address]

## 2023-03-24 NOTE — LETTER
"3/24/2023      RE: Brittani Gaviria  [address]  [place] [address]       Dear Colleague,    Thank you for the opportunity to participate in the care of your patient, Brittani Gaviria, at the [facility] at [facility]. Please see a copy of my visit note below.    Adult Congenital Cardiology Clinic Visit  HPI: Brittani Gaviria is a 32 year old female with history of surgically repaired tetralogy of fallot who presents for ongoing evaluation and management.  Pt reports that in general she is feeling a little better than when she was here in . She is on 180 mg diltiazem daily and feels better on that. She has palpitations several times daily with a feeling of fullness and pressure in her head. Her edema is improved, she notices it rarely.  She denies any chest pain or pressure, sob/torrez, orthopnea, pnd, syncope/presyncope or significant change in exercise tolerance. She does stay home from work about 2 times weekly due to frequent palpitations and head discomfort.    Last MRA  with 40% PI fraction and RVEDV 120 ml/m2.   Stress test with approx 60% expected VO2, hypertensive response to exercise.    Cardiac History: Pt reports that she was born in [place] at term and was delivered via .  She was diagnosed with TOF shortly after birth but :had to wait to grow\" before intervention.  She reports that she underwent complete surgical repair at 11 months of age.  She then underwent a \"valve repair\" at age and then \"valve replacement\" at 13 years of age.  The first two procedures were performed at [facility] which is now closed. The last was performed at [facility] in [place], [place].  She has never been anticoagulated.  She does not recall any significant cardiac limitations growing up.  She may have not a little less energy/exercise tolerance but this was mild.  She denies having any special limitations or " restriction.  The patient followed up at [facility] until she was 17 then at [facility] in [place] (age 17 - 20) and [place], [place].  Her first pregnancy at age 20 was uncomplicated.  The second pregnancy at age 21 was also uncomplicated.  The third pregnancy at age 24 resulted in miscarriage at 13 weeks.  Fourth pregnancy delivered at [facility] via planned  due to prior .    Clinic visit 2023:  No ED visits or hohospitalizations since her last visit.  Patient denies any chest pain or shortness of breath.   Her palpitations have significantly improved. She states, she is at 85% back to normal now. She experiences palpitations 4 to 5 times a moth. It is not as life-limiting this was before. Patient exercises 5 times a week for about 30 minutes a day. She stopped drinking alcohol. She does not smoke.  She is compliant with her cardiac medications.    PAST MEDICAL HISTORY:  Past Medical History:   Diagnosis Date     Carpal tunnel syndrome     right, conservative management      Chlamydia      Cholelithiasis     resolved     Hyperparathyroidism (H) 2016     Iron deficiency anemia      Kidney stones      Migraines      Other and unspecified ovarian cyst      Rh sensitization 2016     Tetralogy of Fallot     surgical correction   Bilateral tubal ligation/mirena IUD  Ventral hernia repair, 2022    CURRENT MEDICATIONS:  Current Outpatient Medications   Medication Sig Dispense Refill     acetaminophen (TYLENOL) 325 MG tablet Take 325-650 mg by mouth       azelastine (ASTELIN) 0.1 % nasal spray        benzoyl peroxide 5 % external liquid        Blood Pressure Monitoring (BLOOD PRESSURE CUFF) MISC Please check blood pressures as needed. 1 each 0     clindamycin (CLEOCIN T) 1 % external solution        cyclobenzaprine (FLEXERIL) 5 MG tablet Take 5-10 mg by mouth       diltiazem ER COATED BEADS (CARDIZEM CD) 180 MG 24 hr capsule Take 1 capsule (180 mg)  by mouth 2 times daily 180 capsule 3     levonorgestrel (MIRENA) 20 MCG/DAY IUD 1 each by Intrauterine route       lisinopril (ZESTRIL) 20 MG tablet Take 1 tablet (20 mg) by mouth daily 90 tablet 3     RETIN-A 0.025 % external cream        vitamin D2 (ERGOCALCIFEROL) 02115 units (1250 mcg) capsule          PAST SURGICAL HISTORY:  Past Surgical History:   Procedure Laterality Date     CARDIAC SURGERY      x 3 @ ages , 11 months, 13 years      SECTION      x 2     CHOLECYSTECTOMY, LAPOROSCOPIC  2014    Cholecystectomy, Laparoscopic       ALLERGIES  Blood transfusion related (informational only), Ketorolac, and Ferumoxytol    FAMILY HX:  Family History   Problem Relation Age of Onset     Diabetes Maternal Grandmother      Hypertension Father      Hypertension Sister      Multiple Sclerosis Mother      Cerebrovascular Disease Mother      Coronary Artery Disease No family hx of      Depression No family hx of      Anxiety Disorder No family hx of      Anesthesia Reaction No family hx of      Cancer No family hx of      Thyroid Disease No family hx of      Glaucoma No family hx of      Macular Degeneration No family hx of        SOCIAL HX:  4 children ages 3-12 years, works in [employer]. 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          VITAL SIGNS:  There were no vitals taken for this visit.  There is no height or weight on file to calculate BMI.  Wt Readings from Last 2 Encounters:   22 99.3 kg (218 lb 14.4 oz)   22 100.4 kg  (221 lb 5.5 oz)     General: appears comfortable, alert and articulate  Neck: no adenopathy, 2+ carotids without bruits  Heart: Normal S1 and loud S2. 3/6 diastolic murmur at the LUSB.    Lungs: clear, no rales or wheezing  Abdomen: soft, non-tender  Extremities: trace pitting edema both LE to mid chin  Neuro: no motor deficits     LABS  WNL 12/28/22    EKG:NSR 69 bpm  RBBB QRS duration 140 msec    Ziopatch Nov 21 symptomatic PAC/PVC possible EAT.     Echo 2/10/17  Patient after transannular patch repair for tetralogy of Fallot. There is no  residual ventricular level shunt. There is mild to moderate right ventricular  enlargement. Normal right ventricular systolic function. Normal left ventricular systolic function with a calculated biplane left ventricular ejection fraction of 60-65%. Trivial tricuspid valve insufficiency. Estimated right ventricular systolic pressure is 25-30 mmHg plus right atrial pressure. There is mild flow acceleration across the pulmonary valve with a peak gradient across the pulmonary valve of 15-20 mmHg. Moderate to severe (3-4+) pulmonary valve insufficiency. There is mild dilation of the aortic root at the level of the sinuses of Valsalva.    CMR Report 5-  Clinical history: 31 F Tetralogy of Fallot repair, severe PI  Comparison CMR: 2020 done at [facility]  1. The LV is normal in cavity size and wall thickness. The global systolic function is normal. The LVEF is 60%. There are no regional wall motion abnormalities. There is no residual VSD  2. The RV is moderately dilated in cavity size. The global systolic function is normal. The RVEF is 53%.   3. Both atria are normal in size. There is no ASD.   4. There is moderate-severe PI (RV 45 ml, RF 40%) and mild PS (peak gr 16 mmHg).   5. Late gadolinium enhancement imaging shows no MI, fibrosis or infiltrative disease.   6. There is no pericardial effusion or thickening.  7. There is no intracardiac thrombus.  MRA Aorta  1. The  aortic root is overriding and mildly dilated. The ascending aorta, transverse arch and descending thoracic aorta are normal in size without an aneurysm or dissection.  2. The aortic arch is left sided. There is normal branching of the arch vessels. There is no coarctation.  3. The main PA is mildly narrowed. The proximal branch pulmonary arteries are normal in size.   4. The systemic venous connections are normal.   5. Normal pulmonary venous anatomy with all pulmonary veins draining to the left atrium.  CONCLUSIONS: Repaired Tetralogy of Fallot, moderate-severe PI (RV 45 ml, RF 40%) and mild PS (peak gr 16mmHg), moderately dilated RV with normal function (RV EDVI 120 ml/m2, RVEF 53%). Mildly dilated aortic root measuring 4.1 cm. No significant change compared to study from 2020.      Echocardiogram 3/24/2023:  Patient after transannular patch repair for tetralogy of Fallot. There is no  residual ventricular level shunt. There is mild to moderate right ventricular  enlargement. Normal right ventricular systolic function. Qualitatively normal  left ventricular systolic function. Trivial tricuspid valve insufficiency.  There is mild flow acceleration across the pulmonary valve with a peak  gradient across the pulmonary valve of 22 mmHg. Moderate to severe (3-4+)  pulmonary valve insufficiency. There is mild dilation of the aortic root at  the level of the sinuses of Valsalva.      ASSESSMENT AND PLAN:  32 year old female with history of surgically repaired tetralogy of Fallot. Wide open PI and right atrial enlargement. RVE at 120 ml/m2 last MRA. Normal LV and RV function by MRI. Hypertension, better controlled on lisinopril 10 mg daily. Palpitations have significantly improved and are well controlled on Diltiazem  mg daily. She has 4 to 5 episodes of palpitations per months.    Recommendations:  Continue Lisinopril 10 mg daily.   Continue diltiazem 180 mg daily  Cardiac MRI/MRA and CPX prior to next appointment.    Continue to follow EP.  Continue to eat a heart healthy, low salt diet.  Continue to get 20-30 minutes of aerobic activity, 4-5 days per week.  Examples of aerobic activity include walking, running, swimming, cycling, etc.  Continue to observe good oral hygiene, with regular dental visits    F/U [facility] cardiology in the summer with cardiac MRI/MRA and CPX prior.  Call if new or worsening symptoms    Patient was seen and discussed with Dr. Wilkins.    Rubi Ruiz MD  Cardiology fellow    I, Neil Wilkins MD, saw this patient with the fellow and agree with the  findings and plan of care as documented in this note.I have reviewed this patient's history, examined the patient and reviewed relevant laboratory findings and diagnostic testing. I have discussed the plan of care with the patient at the time of this visit.     Neil Wilkins M.D.   of Pediatrics  Pediatric and Adult Congenital Cardiology  [facility]  Pediatric Cardiology Office [phone]  Adult Congenital Cardiology Triage and Scheduling [phone]    A total of 30 minutes were spent in personal review of testing, review of documented history and interval events in chart, and face to face visit with discussion of findings and plan.           Please do not hesitate to contact me if you have any questions/concerns.     Sincerely,     Neil Wilkins MD

## 2023-03-24 NOTE — PROGRESS NOTES
[facility] ELECTROPHYSIOLOGY CLINIC VISIT    Assessment/Recommendations   Assessment/Plan:      Ms. Gaviria is a 32 year old female who has a past medical history significant for TOF s/p surgical repair at 11 months, s/p pulmonary valve repair at 7 years, s/p pulmonary valve replacement at age 13 years, and palpitations.     Palpitations:   Symptoms have improved with Diltiazem 180 mg CD daily, cutting out caffeine, weight loss and exercise. Continued to encourage her with these efforts. She is seeing Dr. Wilkins today for continued evaluations as well.     Follow up in Summer 2023.         History of Present Illness/Subjective    Ms. Brittani Gaviria is a 32 year old female who comes in today for EP consultation of palpitations.    Ms. Gaviria is a 32 year old female who has a past medical history significant for TOF s/p surgical repair at 11 months, s/p pulmonary valve repair at 7 years, s/p pulmonary valve replacement at age 13 years, and palpitations.     She was born with TOF and underwent repair at 11 months. She required pulmonary valve repair at age 7 and the a pulmonary valve replacement at 13 years of age. Her last CMR at OSH showed normal biventricular function, severe RV dilation, moderate LV dilation, and moderate pulmonary valve regurgitation. She began developing palpitations. She had an episode in 11/2021 with palpitations and dizziness and had to sit down. She was evaluated at OSH and ECG and echo were stable and no intervention was done. Her symptoms continued and she was seen again. A zio patch from OSH in 1/2022 reported to show 1 NSVT 6 beats, 45 PSVT up to 7.2 seconds, frequent PACs 14.4%, and rare isolated PVCs. 56 symptom activations reportedly corresponded to PAC/PVCs. She was started on Metoprolol. She had some weird hallucinations and metoprolol was lowered and this subsided. She started having some chest pains and metoprolol was decreased further but symptoms continued. Metoprolol was  stopped in 4/5/22 due to this.     EP Visit 5/6/22: She reports feeling OK. She has noted her COOPER has progressed over last year. She continues to have palpitations many times a day for a minute or less. She denies chest discomfort, palpitations, abdominal fullness/bloating or peripheral edema, shortness of breath, paroxysmal nocturnal dyspnea, orthopnea, lightheadedness, dizziness, pre-syncope, or syncope. Presenting 12 lead ECG shows SR with PACs IVCD Vent Rate 73 bpm,  ms,  ms, QTc 464 ms. No current cardiac medications. She was started on Diltiazem at this visit.     EP Visit 6/3/22: She presents today for follow up. She reports feeling improved symptoms control with diltiazem. She still has some residual palpitations but much improved. She denies chest discomfort, abdominal fullness/bloating or peripheral edema, shortness of breath, paroxysmal nocturnal dyspnea, orthopnea, lightheadedness, dizziness, pre-syncope, or syncope. Current cardiac medication: Diltiazem.     She presents today for follow up. She reports feeling improved since we last saw her. She feels symptoms >80% improved. She still has some episodes about 4-5 times a month of short palpitations with chest pressure and lightheadedness. She states she cut out caffeine, lost weight, increased exercise and with the diltiazem feels her symptoms have improved. She is getting her IUD out to see if this will help improve some of her remaining symptoms. She denies chest discomfort, palpitations, abdominal fullness/bloating or peripheral edema, shortness of breath, paroxysmal nocturnal dyspnea, orthopnea, lightheadedness, dizziness, pre-syncope, or syncope. Presenting 12 lead ECG shows NSR Vent Rate 67 bpm,  ms,  ms, QTc 452 ms. Current cardiac medication: Diltiazem and Lisinopril.     I have reviewed and updated the patient's Past Medical History, Social History, Family History and Medication List.     Cardiographics (Personally  Reviewed) :   11/2021 Echo (OS Report):  CONCLUSIONS   Patient is status post repair of tetralogy of Fallot.   1. Left ventricular ejection fraction is visually estimated at 65%.   Abnormal septal motion consistent with prior open heart surgery.   2. Moderate right ventricular dilation is present. Global right   ventricular systolic function is moderately reduced.   3. Mixed pulmonary valve disease. Pulmonic stenosis. Peak velocity   through the pulmonary valve is 2.5 m/sec and a peak gradient 27 mm of   Hg..   Probably severe pulmonic regurgitation.   4. Proximal ascending aorta measures at 3.9 cm.   Compared to the prior study dated June 11, 2019 , no significant   change was noted.     2/10/17 Echo:      CONCLUSIONS  Patient after transannular patch repair for tetralogy of Fallot. There is no  residual ventricular level shunt. There is mild to moderate right ventricular  enlargement. Normal right ventricular systolic function. Normal left  ventricular systolic function with a calculated biplane left ventricular  ejection fraction of 60-65%. Trivial tricuspid valve insufficiency. Estimated  right ventricular systolic pressure is 25-30 mmHg plus right atrial pressure.  There is mild flow acceleration across the pulmonary valve with a peak  gradient across the pulmonary valve of 15-20 mmHg. Moderate to severe (3-4+)  pulmonary valve insufficiency. There is mild dilation of the aortic root at  the level of the sinuses of Valsalva.    10/30/20 CMR (OS Report):  Impression:   1. Normal left and right ventricular function, EF 55.6 and 46.9% respectively   2. Moderate left ventricular enlargement with severe right ventricular enlargement.   3. Overriding aortic root with mildly dilated sinus of Valsalva at 41.9 mm.   4. Moderate pulmonary regurgitation with a regurgitant volume of 44.2 mL, regurgitation fraction of 32.3%.   5. No residual VSD          Physical Examination   /88 (BP Location: Right arm, Patient  "Position: Sitting, Cuff Size: Adult Large)   Pulse 71   Ht 1.703 m (5' 7.05\")   Wt 95.4 kg (210 lb 4.8 oz)   SpO2 100%   BMI 32.89 kg/m    Wt Readings from Last 3 Encounters:   12/09/22 99.3 kg (218 lb 14.4 oz)   11/16/22 100.4 kg (221 lb 5.5 oz)   06/03/22 97.1 kg (214 lb 1.6 oz)     General Appearance:   Alert, well-appearing and in no acute distress.   HEENT: Atraumatic, normocephalic. PERRL.  MMM.   Chest/Lungs:   Respirations unlabored.  Lungs are clear to auscultation.   Cardiovascular:   Regular. Murmur present.    Abdomen:  Soft, nontender, nondistended.   Extremities: No cyanosis or clubbing. No edema.    Musculoskeletal: Moves all extremities.  Gait normal.   Skin: Warm, dry, intact.    Neurologic: Mood and affect are appropriate.  Alert and oriented to person, place, time, and situation.          Medications  Allergies   Current Outpatient Medications   Medication Sig Dispense Refill     acetaminophen (TYLENOL) 325 MG tablet Take 325-650 mg by mouth       azelastine (ASTELIN) 0.1 % nasal spray        benzoyl peroxide 5 % external liquid        Blood Pressure Monitoring (BLOOD PRESSURE CUFF) MISC Please check blood pressures as needed. 1 each 0     clindamycin (CLEOCIN T) 1 % external solution        cyclobenzaprine (FLEXERIL) 5 MG tablet Take 5-10 mg by mouth       diltiazem ER COATED BEADS (CARDIZEM CD) 180 MG 24 hr capsule Take 1 capsule (180 mg) by mouth 2 times daily 180 capsule 3     levonorgestrel (MIRENA) 20 MCG/DAY IUD 1 each by Intrauterine route       lisinopril (ZESTRIL) 20 MG tablet Take 1 tablet (20 mg) by mouth daily 90 tablet 3     RETIN-A 0.025 % external cream        vitamin D2 (ERGOCALCIFEROL) 86742 units (1250 mcg) capsule       Allergies   Allergen Reactions     Blood Transfusion Related (Informational Only) Other (See Comments)     Patient has a history of a clinically significant antibody against RBC antigens.  A delay in compatible RBCs may occur.     Ketorolac Hives, Itching " and Unknown     Tolerates ibuprofen without reaction       Ferumoxytol Difficulty breathing, Cramps, Hives, Itching and Muscle Pain (Myalgia)         Lab Results (Personally Reviewed)    Chemistry/lipid CBC Cardiac Enzymes/BNP/TSH/INR   Lab Results   Component Value Date    BUN 6.6 12/28/2022     12/28/2022    CO2 24 12/28/2022     Creatinine   Date Value Ref Range Status   12/28/2022 0.78 0.51 - 0.95 mg/dL Final   07/20/2016 0.74 0.52 - 1.04 mg/dL Final       Lab Results   Component Value Date    CHOL 171 05/06/2022    HDL 48 (L) 05/06/2022     (H) 05/06/2022      Lab Results   Component Value Date    WBC 12.8 (H) 11/30/2016    HGB  11/30/2016     CHANGE ORDER PER MD  CORRECTED ON 12/01 AT 0931: PREVIOUSLY REPORTED AS 8.1      HGB 8.1 (L) 11/30/2016    HCT 26.6 (L) 11/30/2016    MCV 86 11/30/2016     11/30/2016    Lab Results   Component Value Date    TSH 1.27 05/06/2022        The patient states understanding and is agreeable with the plan.   KATE Nathan CNP  Electrophysiology Consult Service  Pager: [phone]

## 2023-03-24 NOTE — NURSING NOTE
Chief Complaint   Patient presents with     Follow Up     32 yr old female with PMH significant for TOF and mechanical valve presenting for evaluation.             Vitals were taken and medications reconciled.     Andres Gonsalves, Visit Facilitator    10:28 AM

## 2023-03-28 LAB
ATRIAL RATE - MUSE: 67 BPM
DIASTOLIC BLOOD PRESSURE - MUSE: NORMAL MMHG
INTERPRETATION ECG - MUSE: NORMAL
P AXIS - MUSE: 6 DEGREES
PR INTERVAL - MUSE: 142 MS
QRS DURATION - MUSE: 136 MS
QT - MUSE: 428 MS
QTC - MUSE: 452 MS
R AXIS - MUSE: 86 DEGREES
SYSTOLIC BLOOD PRESSURE - MUSE: NORMAL MMHG
T AXIS - MUSE: 54 DEGREES
VENTRICULAR RATE- MUSE: 67 BPM

## 2023-04-25 ENCOUNTER — MYC MEDICAL ADVICE (OUTPATIENT)
Dept: CARDIOLOGY | Facility: CLINIC | Age: 33
End: 2023-04-25
Payer: COMMERCIAL

## 2023-05-31 ENCOUNTER — MYC MEDICAL ADVICE (OUTPATIENT)
Dept: CARDIOLOGY | Facility: CLINIC | Age: 33
End: 2023-05-31
Payer: COMMERCIAL

## 2023-05-31 DIAGNOSIS — Q21.3 TOF (TETRALOGY OF FALLOT): ICD-10-CM

## 2023-06-02 RX ORDER — DILTIAZEM HYDROCHLORIDE 120 MG/1
120 CAPSULE, COATED, EXTENDED RELEASE ORAL DAILY
Qty: 30 CAPSULE | Refills: 0 | Status: SHIPPED | OUTPATIENT
Start: 2023-06-02 | End: 2023-07-28

## 2023-06-02 NOTE — TELEPHONE ENCOUNTER
Date: 6/2/2023    Time of Call: 2:06 PM     Diagnosis:  TOF     [ TORB ] Ordering provider: Casper Valdes MD  Order: decrease your diltiazem to 120 mg daily for 1 month and then off     Order received by: Sadie Hurd RN      Follow-up/additional notes: updated patient

## 2023-07-12 DIAGNOSIS — Q21.3 TOF (TETRALOGY OF FALLOT): ICD-10-CM

## 2023-07-17 NOTE — TELEPHONE ENCOUNTER
diltiazem  120 MG  Last Written Prescription Date:  6/2/23  Last Fill Quantity: 30,   # refills: 0  Last Office Visit : 2/7/23  Future Office visit:  10/3/23  Routing refill request to provider for review/approval because:  Does Card want to discontinue?    decrease your diltiazem to 120 mg daily for 1 month and then off

## 2023-07-18 ENCOUNTER — MYC MEDICAL ADVICE (OUTPATIENT)
Dept: CARDIOLOGY | Facility: CLINIC | Age: 33
End: 2023-07-18
Payer: COMMERCIAL

## 2023-07-28 RX ORDER — DILTIAZEM HYDROCHLORIDE 120 MG/1
CAPSULE, COATED, EXTENDED RELEASE ORAL
Qty: 90 CAPSULE | Refills: 3 | Status: ON HOLD | OUTPATIENT
Start: 2023-07-28 | End: 2024-09-24

## 2023-09-22 ENCOUNTER — MYC MEDICAL ADVICE (OUTPATIENT)
Dept: PEDIATRIC CARDIOLOGY | Facility: CLINIC | Age: 33
End: 2023-09-22
Payer: COMMERCIAL

## 2023-09-23 ENCOUNTER — LAB (OUTPATIENT)
Dept: LAB | Facility: CLINIC | Age: 33
End: 2023-09-23
Payer: COMMERCIAL

## 2023-09-23 DIAGNOSIS — I10 HTN (HYPERTENSION): ICD-10-CM

## 2023-09-23 DIAGNOSIS — Q21.3 TETRALOGY OF FALLOT: ICD-10-CM

## 2023-09-23 LAB
ALBUMIN SERPL BCG-MCNC: 4.1 G/DL (ref 3.5–5.2)
ALP SERPL-CCNC: 58 U/L (ref 35–104)
ALT SERPL W P-5'-P-CCNC: 14 U/L (ref 0–50)
ANION GAP SERPL CALCULATED.3IONS-SCNC: 7 MMOL/L (ref 7–15)
AST SERPL W P-5'-P-CCNC: 16 U/L (ref 0–45)
BILIRUB SERPL-MCNC: 0.6 MG/DL
BUN SERPL-MCNC: 8.9 MG/DL (ref 6–20)
CALCIUM SERPL-MCNC: 10 MG/DL (ref 8.6–10)
CHLORIDE SERPL-SCNC: 106 MMOL/L (ref 98–107)
CREAT SERPL-MCNC: 0.86 MG/DL (ref 0.51–0.95)
DEPRECATED HCO3 PLAS-SCNC: 28 MMOL/L (ref 22–29)
EGFRCR SERPLBLD CKD-EPI 2021: 90 ML/MIN/1.73M2
GLUCOSE SERPL-MCNC: 110 MG/DL (ref 70–99)
POTASSIUM SERPL-SCNC: 4 MMOL/L (ref 3.4–5.3)
PROT SERPL-MCNC: 7.8 G/DL (ref 6.4–8.3)
SODIUM SERPL-SCNC: 141 MMOL/L (ref 136–145)

## 2023-09-23 PROCEDURE — 36415 COLL VENOUS BLD VENIPUNCTURE: CPT | Performed by: PATHOLOGY

## 2023-09-23 PROCEDURE — 80053 COMPREHEN METABOLIC PANEL: CPT | Performed by: PATHOLOGY

## 2023-09-27 ENCOUNTER — MYC MEDICAL ADVICE (OUTPATIENT)
Dept: CARDIOLOGY | Facility: CLINIC | Age: 33
End: 2023-09-27
Payer: COMMERCIAL

## 2023-10-31 ENCOUNTER — ALLIED HEALTH/NURSE VISIT (OUTPATIENT)
Dept: CARDIOLOGY | Facility: CLINIC | Age: 33
End: 2023-10-31
Payer: COMMERCIAL

## 2023-10-31 DIAGNOSIS — Z01.30 BP CHECK: Primary | ICD-10-CM

## 2023-10-31 NOTE — PROGRESS NOTES
Brittani Gaviria was evaluated at [facility] on October 31, 2023 at which time her blood pressure was:    BP Readings from Last 1 Encounters:   03/24/23 127/88     Systolic (Patient Reported): 137 (10/31/2023  1:48 PM)  Diastolic (Patient Reported): (!) 98 (10/31/2023  1:48 PM)        Reviewed lifestyle modifications for blood pressure control and reduction: including making healthy food choices, managing weight, getting regular exercise, smoking cessation, reducing alcohol consumption, monitoring blood pressure regularly.     Symptoms: None    BP Goal:< 140/90 mmHg    BP Assessment:  BP too high    Potential Reasons for BP too high: New med changes going into effect.    BP Follow-Up Plan: Recheck BP in 30 days at pharmacy    Recommendation to Provider: No additional recommendation at this time.    Note completed by: Lida Boudreaux RPH

## 2023-11-15 ENCOUNTER — TELEPHONE (OUTPATIENT)
Dept: CARDIOLOGY | Facility: CLINIC | Age: 33
End: 2023-11-15
Payer: COMMERCIAL

## 2023-11-15 NOTE — TELEPHONE ENCOUNTER
[facility] Call Center    Phone Message    May a detailed message be left on voicemail: yes     Reason for Call: Symptoms or Concerns     If patient has red-flag symptoms, warm transfer to triage line    Current symptom or concern: SOB    Symptoms have been present for:   years(s), but in the last few days gotten worse when she walks up stairs.     Has patient previously been seen for this? Yes    By Deyanira Lynn    Are there any new or worsening symptoms? Yes: Patient was told by PCP to call and let her care team know that she has been having SOB. Patient PCP thinks it might be her iron levels too. Please reach out to patient regarding this.     Action Taken: Other: cardiology     Travel Screening: Not Applicable    Thank you!  [facility]

## 2023-11-15 NOTE — TELEPHONE ENCOUNTER
LVM for patient reminding her that she has testing tomorrow 11/16 that will help Deyanira and Dr. Wilkins see what's going on with her heart. Invited her to call us back with any questions.

## 2023-11-16 ENCOUNTER — HOSPITAL ENCOUNTER (OUTPATIENT)
Dept: MRI IMAGING | Facility: CLINIC | Age: 33
Discharge: HOME | End: 2023-11-16
Payer: COMMERCIAL

## 2023-11-16 ENCOUNTER — HOSPITAL ENCOUNTER (OUTPATIENT)
Dept: CARDIOLOGY | Facility: CLINIC | Age: 33
Discharge: HOME | End: 2023-11-16
Payer: COMMERCIAL

## 2023-11-16 VITALS — HEIGHT: 66 IN | BODY MASS INDEX: 30.79 KG/M2 | WEIGHT: 191.58 LBS

## 2023-11-16 DIAGNOSIS — Q21.3 TETRALOGY OF FALLOT: ICD-10-CM

## 2023-11-16 DIAGNOSIS — I37.1 PULMONARY VALVE INSUFFICIENCY, UNSPECIFIED ETIOLOGY: ICD-10-CM

## 2023-11-16 DIAGNOSIS — I51.7 ENLARGED RV (RIGHT VENTRICLE): ICD-10-CM

## 2023-11-16 DIAGNOSIS — I10 ESSENTIAL HYPERTENSION: ICD-10-CM

## 2023-11-16 PROCEDURE — 71555 MRI ANGIO CHEST W OR W/O DYE: CPT

## 2023-11-16 PROCEDURE — 75565 CARD MRI VELOC FLOW MAPPING: CPT | Mod: 26 | Performed by: INTERNAL MEDICINE

## 2023-11-16 PROCEDURE — 94621 CARDIOPULM EXERCISE TESTING: CPT | Mod: 26 | Performed by: INTERNAL MEDICINE

## 2023-11-16 PROCEDURE — 255N000002 HC RX 255 OP 636: Mod: JZ

## 2023-11-16 PROCEDURE — 71555 MRI ANGIO CHEST W OR W/O DYE: CPT | Mod: 26 | Performed by: INTERNAL MEDICINE

## 2023-11-16 PROCEDURE — 75561 CARDIAC MRI FOR MORPH W/DYE: CPT | Mod: 26 | Performed by: INTERNAL MEDICINE

## 2023-11-16 PROCEDURE — 75565 CARD MRI VELOC FLOW MAPPING: CPT

## 2023-11-16 PROCEDURE — 94621 CARDIOPULM EXERCISE TESTING: CPT

## 2023-11-16 PROCEDURE — A9585 GADOBUTROL INJECTION: HCPCS | Mod: JZ

## 2023-11-16 RX ORDER — GADOBUTROL 604.72 MG/ML
10 INJECTION INTRAVENOUS ONCE
Status: COMPLETED | OUTPATIENT
Start: 2023-11-16 | End: 2023-11-16

## 2023-11-16 RX ADMIN — GADOBUTROL 10 ML: 604.72 INJECTION INTRAVENOUS at 08:49

## 2023-11-17 ENCOUNTER — TELEPHONE (OUTPATIENT)
Dept: CARDIOLOGY | Facility: CLINIC | Age: 33
End: 2023-11-17
Payer: COMMERCIAL

## 2023-11-17 ENCOUNTER — MYC MEDICAL ADVICE (OUTPATIENT)
Dept: CARDIOLOGY | Facility: CLINIC | Age: 33
End: 2023-11-17
Payer: COMMERCIAL

## 2023-11-17 DIAGNOSIS — Q21.3 TETRALOGY OF FALLOT: Primary | ICD-10-CM

## 2023-11-17 DIAGNOSIS — Q24.9 ADULT CONGENITAL HEART DISEASE: ICD-10-CM

## 2023-11-17 DIAGNOSIS — I47.10 NONSUSTAINED SUPRAVENTRICULAR TACHYCARDIA (H): ICD-10-CM

## 2023-11-17 LAB
CARDIOPULMONARY ANAEROBIC THRESHOLD PREDICTED PEAK: 48 %
CARDIOPULMONARY ANAEROBIC THRESHOLD VO2: 15.7 ML/KG/MIN
CARDIOPULMONARY BLOOD PRESSURE REST: NORMAL MMHG
CARDIOPULMONARY BREATHING RESERVE REST: 91
CARDIOPULMONARY BREATHING RESERVE V02MAX: 44
CARDIOPULMONARY CO2 OUTPUT REST: 248 ML/MIN
CARDIOPULMONARY CO2 OUTPUT VO2MAX: 1904 ML/MIN
CARDIOPULMONARY FEV 1.0 (L) ACTUAL: 3.03
CARDIOPULMONARY FEV 1.0 (L) PRECENT: 90 %
CARDIOPULMONARY FEV 1.0 (L) PREDICTED: 3.36
CARDIOPULMONARY FEV 1.0 FVC (%) ACTUAL: 86.4
CARDIOPULMONARY FEV 1.0 FVC (%) PERCENT: 103 %
CARDIOPULMONARY FEV 1.0 FVC (%) PREDICTED: 83.6
CARDIOPULMONARY FUNCTIONAL CAPACITY MAX ML/KG/MIN: 17.9 ML/KG/MIN
CARDIOPULMONARY FUNCTIONAL CAPACITY PERCENT: 55 %
CARDIOPULMONARY FUNCTIONAL CAPACITY PREDICTED: 32.5 ML/KG/MIN
CARDIOPULMONARY FVC (L) ACTUAL: 3.51
CARDIOPULMONARY FVC (L) PERCENT: 86 %
CARDIOPULMONARY FVC (L) PREDICTED: 4.06
CARDIOPULMONARY HEART RATE REST: 80 BPM
CARDIOPULMONARY MET'S REST: 1.2
CARDIOPULMONARY MINUTE VENTILATION REST: 9.5 L/MIN
CARDIOPULMONARY MINUTE VENTILATION VO2MAX: 59.3 L/MIN
CARDIOPULMONARY MYOCARDIAC O2 DEMAND MAX: NORMAL
CARDIOPULMONARY OXYGEN CONSUMPTION REST: 4.1 ML/KG/MIN
CARDIOPULMONARY OXYGEN CONSUMPTION VO2MAX: 17.9 ML/KG/MIN
CARDIOPULMONARY OXYGEN PULSE REST: 4 ML/BEAT
CARDIOPULMONARY OXYGEN PULSE VO2MAX: 9.3 ML/BEAT
CARDIOPULMONARY OXYGEN SATURATION- OXIMETRY REST: 100 %
CARDIOPULMONARY OXYGEN SATURATION- OXIMETRY VO2MAX: 100 %
CARDIOPULMONARY PET C02 REST: 37
CARDIOPULMONARY PET C02 VO2MAX: 36
CARDIOPULMONARY PET02 REST: 94
CARDIOPULMONARY PET02 V02 MAX: 111
CARDIOPULMONARY RER: 1.16
CARDIOPULMONARY RESPIRALORY EXCHANGE RATIO VO2MAX: 1.16
CARDIOPULMONARY RESPIRALORY EXCHANGE RATIO: 0.69
CARDIOPULMONARY RESPIRATORY RATE REST: 20 BR/MIN
CARDIOPULMONARY RESPIRATORY RATE VO2MAX: 41 BR/MIN
CARDIOPULMONARY STRESS BASE 1 BP MMHG: NORMAL MMHG
CARDIOPULMONARY STRESS BASE 1 BPA: 154 BPM
CARDIOPULMONARY STRESS BASE 1 SPO2: 100 % SPO2
CARDIOPULMONARY STRESS BASE 1 TIME: 1 MINS
CARDIOPULMONARY STRESS BASE 2 BP MMHG: NORMAL MMHG
CARDIOPULMONARY STRESS BASE 2 BPA: 105 BPM
CARDIOPULMONARY STRESS BASE 2 SPO2: 100 % SPO2
CARDIOPULMONARY STRESS BASE 2 TIME: 3 MINS
CARDIOPULMONARY STRESS BASE 3 BP MMHG: NORMAL MMHG
CARDIOPULMONARY STRESS BASE 3 BPA: 91 BPM
CARDIOPULMONARY STRESS BASE 3 SPO2: 100 % SPO2
CARDIOPULMONARY STRESS BASE 3 TIME: 5 MINS
CARDIOPULMONARY STRESS PHASE 1 BP MMHG: NORMAL MMHG
CARDIOPULMONARY STRESS PHASE 1 BPM: 107 BPM
CARDIOPULMONARY STRESS PHASE 1 SPO2: 100 % SPO2
CARDIOPULMONARY STRESS PHASE 1 TIME: 2 MINS
CARDIOPULMONARY STRESS PHASE 2 BP MMHG: NORMAL MMHG
CARDIOPULMONARY STRESS PHASE 2 BPM: 130 BPM
CARDIOPULMONARY STRESS PHASE 2 SPO2: 99 % SPO2
CARDIOPULMONARY STRESS PHASE 2 TIME: 4 MINS
CARDIOPULMONARY STRESS PHASE 3 BP MMHG: NORMAL MMHG
CARDIOPULMONARY STRESS PHASE 3 BPM: 155 BPM
CARDIOPULMONARY STRESS PHASE 3 SPO2: 100 % SPO2
CARDIOPULMONARY STRESS PHASE 3 TIME: 6 MINS
CARDIOPULMONARY STRESS PHASE 4 BPM: 167 BPM
CARDIOPULMONARY STRESS PHASE 4 SPO2: 100 % SPO2
CARDIOPULMONARY STRESS PHASE 4 TIME: 7 MINS
CARDIOPULMONARY SVC (L) ACTUAL: 3.53
CARDIOPULMONARY SVC (L) PERCENT: 87 %
CARDIOPULMONARY SVC (L) PREDICTED: 4.06
CARDIOPULMONARY TIDAL VOLUME REST: 482 ML
CARDIOPULMONARY TIDAL VOLUME VO2MAX: 1452 ML
CARDIOPULMONARY VE/VCO2 SLOPE: 30.54
CARDIOPULMONARY VENTILATORY EQUIVALENT 02 REST: 26
CARDIOPULMONARY VENTILATORY EQUIVALENT 02 V02: 36
CARDIOPULMONARY VENTILATORY EQUIVALENT C02 REST: 38
CARDIOPULMONARY VENTILATORY EQUIVALENT C02 SLOPE VO2MAX: 30.54
CARDIOPULMONARY VENTILATORY EQUIVALENT C02 VO2MAX: 31
CV STRESS MAX HR HE: 167
PREDICTED VO2MAX: 32.5
RATED PERCEIVED EXERTION: 15
STRESS ANGINA INDEX: 0
STRESS ECHO BASELINE BP: NORMAL MMHG
STRESS ECHO BASELINE HR: 72 BPM
STRESS ECHO CALCULATED PERCENT HR: 90 %
STRESS ECHO LAST STRESS BP: NORMAL MMHG
STRESS ECHO POST ESTIMATED WORKLOAD: 5.1 METS
STRESS ECHO POST EXERCISE DUR MIN: 7 MIN
STRESS ECHO TARGET HR: 186

## 2023-11-17 NOTE — TELEPHONE ENCOUNTER
Date: 11/17/2023    Time of Call: 3:18 PM     Diagnosis:  non sustained SVT     [ TORB ] Ordering provider: Deyanira Sales NP  Order: zio patch 14 days     Order received by: Sadie Hurd RN     Follow-up/additional notes: sent to scheduling and updated patient

## 2023-11-17 NOTE — TELEPHONE ENCOUNTER
Left Voicemail (1st Attempt) and Sent Mychart (1st Attempt) for the patient to call back and schedule the following:    Appointment type: Ziopatch 14 days  Provider: Deyanira Sales  Return date: Prior to 12/1/2023  Specialty phone number: [phone] option 1  Additional appointment(s) needed: NA  Additonal Notes: Appt is 12/1/23 pt needs to wear ziopatch for 14 days prior.

## 2023-11-20 ENCOUNTER — ANCILLARY PROCEDURE (OUTPATIENT)
Dept: CARDIOLOGY | Facility: CLINIC | Age: 33
End: 2023-11-20
Attending: NURSE PRACTITIONER
Payer: COMMERCIAL

## 2023-11-20 DIAGNOSIS — I47.10 NONSUSTAINED SUPRAVENTRICULAR TACHYCARDIA (H): ICD-10-CM

## 2023-11-20 DIAGNOSIS — Q21.3 TETRALOGY OF FALLOT: ICD-10-CM

## 2023-11-20 DIAGNOSIS — Q24.9 ADULT CONGENITAL HEART DISEASE: ICD-10-CM

## 2023-11-20 PROCEDURE — 93227 XTRNL ECG REC<48 HR R&I: CPT | Performed by: INTERNAL MEDICINE

## 2023-11-20 PROCEDURE — 93246 EXT ECG>7D<15D RECORDING: CPT

## 2023-11-20 NOTE — PROGRESS NOTES
Per Dr. Sales, patient to have 14 day zio monitor placed.  Diagnosis: TOF  Monitor placed: Yes  Patient Instructed: Yes  Patient verbalized understanding: Yes  Holter # [id number]   Joshua Land

## 2023-11-24 ENCOUNTER — ANCILLARY PROCEDURE (OUTPATIENT)
Dept: CARDIOLOGY | Facility: CLINIC | Age: 33
End: 2023-11-24
Attending: NURSE PRACTITIONER
Payer: COMMERCIAL

## 2023-11-24 DIAGNOSIS — Q21.3 TETRALOGY OF FALLOT: ICD-10-CM

## 2023-11-24 DIAGNOSIS — I47.10 NONSUSTAINED SUPRAVENTRICULAR TACHYCARDIA (H): ICD-10-CM

## 2023-11-24 DIAGNOSIS — Q24.9 ADULT CONGENITAL HEART DISEASE: ICD-10-CM

## 2023-11-24 PROCEDURE — 93242 EXT ECG>48HR<7D RECORDING: CPT

## 2023-11-24 PROCEDURE — 93244 EXT ECG>48HR<7D REV&INTERPJ: CPT | Performed by: INTERNAL MEDICINE

## 2023-11-24 NOTE — PROGRESS NOTES
Per  Deyanira Sales, patient to have 7 day ziopatch monitor placed.  Diagnosis: nonsustained supraventricular tachycardia, tetralogy of fallout, adult congenital heart disease  Monitor placed: Yes  Patient Instructed: Yes  Patient verbalized understanding: Yes  Holter # [id number]

## 2023-12-01 ENCOUNTER — OFFICE VISIT (OUTPATIENT)
Dept: CARDIOLOGY | Facility: CLINIC | Age: 33
End: 2023-12-01
Payer: COMMERCIAL

## 2023-12-01 VITALS
HEIGHT: 66 IN | DIASTOLIC BLOOD PRESSURE: 89 MMHG | HEART RATE: 83 BPM | OXYGEN SATURATION: 100 % | BODY MASS INDEX: 31.45 KG/M2 | WEIGHT: 195.7 LBS | SYSTOLIC BLOOD PRESSURE: 144 MMHG

## 2023-12-01 DIAGNOSIS — I47.10 NONSUSTAINED SUPRAVENTRICULAR TACHYCARDIA (H): ICD-10-CM

## 2023-12-01 DIAGNOSIS — Q21.3 TETRALOGY OF FALLOT: Primary | ICD-10-CM

## 2023-12-01 PROCEDURE — 99213 OFFICE O/P EST LOW 20 MIN: CPT | Performed by: NURSE PRACTITIONER

## 2023-12-01 PROCEDURE — 93005 ELECTROCARDIOGRAM TRACING: CPT

## 2023-12-01 PROCEDURE — G0463 HOSPITAL OUTPT CLINIC VISIT: HCPCS | Performed by: NURSE PRACTITIONER

## 2023-12-01 PROCEDURE — 93010 ELECTROCARDIOGRAM REPORT: CPT | Performed by: INTERNAL MEDICINE

## 2023-12-01 RX ORDER — ONDANSETRON 4 MG/1
TABLET, ORALLY DISINTEGRATING ORAL
COMMUNITY
Start: 2023-09-18

## 2023-12-01 RX ORDER — NARATRIPTAN 2.5 MG/1
TABLET ORAL
COMMUNITY
Start: 2023-09-18 | End: 2024-07-22

## 2023-12-01 RX ORDER — DILTIAZEM HCL 60 MG
60 TABLET ORAL 3 TIMES DAILY PRN
Qty: 90 TABLET | Refills: 1 | Status: SHIPPED | OUTPATIENT
Start: 2023-12-01 | End: 2024-10-03

## 2023-12-01 RX ORDER — FLUTICASONE PROPIONATE 50 MCG
2 SPRAY, SUSPENSION (ML) NASAL
COMMUNITY
Start: 2023-04-20

## 2023-12-01 RX ORDER — AMOXICILLIN 500 MG/1
CAPSULE ORAL
COMMUNITY
Start: 2023-05-08 | End: 2023-12-11

## 2023-12-01 ASSESSMENT — PAIN SCALES - GENERAL: PAINLEVEL: NO PAIN (0)

## 2023-12-01 NOTE — PROGRESS NOTES
"    [facility] VISIT    Assessment/Recommendations   Assessment/Plan:    Ms. Gaviria is a 33 year old female who has a past medical history significant for TOF s/p surgical repair at 11 months, s/p pulmonary valve repair at 7 years, s/p pulmonary valve replacement at age 13 years, and palpitations.     Palpitations:   Symptoms have improved with Diltiazem 180 mg CD daily, cutting out caffeine, weight loss and exercise. She still has some days that are more \"off\" from palpitations standpoint. Will give her PRN short acting diltiazem to take a dose if palpitations. Continued to encourage her with these efforts. She is seeing Dr. Wilkins upcoming to review her CMR.     Follow up in 6 months or sooner if need arises.          History of Present Illness/Subjective    Ms. Brittani Gaviria is a 33 year old female who comes in today for EP consultation of palpitations.    Ms. Gaviria is a 33 year old female who has a past medical history significant for TOF s/p surgical repair at 11 months, s/p pulmonary valve repair at 7 years, s/p pulmonary valve replacement at age 13 years, and palpitations.     She was born with TOF and underwent repair at 11 months. She required pulmonary valve repair at age 7 and the a pulmonary valve replacement at 13 years of age. Her last CMR at OSH showed normal biventricular function, severe RV dilation, moderate LV dilation, and moderate pulmonary valve regurgitation. She began developing palpitations. She had an episode in 11/2021 with palpitations and dizziness and had to sit down. She was evaluated at OSH and ECG and echo were stable and no intervention was done. Her symptoms continued and she was seen again. A zio patch from OSH in 1/2022 reported to show 1 NSVT 6 beats, 45 PSVT up to 7.2 seconds, frequent PACs 14.4%, and rare isolated PVCs. 56 symptom activations reportedly corresponded to PAC/PVCs. She was started on Metoprolol. She had some weird hallucinations and " Does NOT APPEAR VISUALLY SIGNIFICANT. metoprolol was lowered and this subsided. She started having some chest pains and metoprolol was decreased further but symptoms continued. Metoprolol was stopped in 4/5/22 due to this.     EP Visit 5/6/22: She reports feeling OK. She has noted her COOPER has progressed over last year. She continues to have palpitations many times a day for a minute or less. She denies chest discomfort, palpitations, abdominal fullness/bloating or peripheral edema, shortness of breath, paroxysmal nocturnal dyspnea, orthopnea, lightheadedness, dizziness, pre-syncope, or syncope. Presenting 12 lead ECG shows SR with PACs IVCD Vent Rate 73 bpm,  ms,  ms, QTc 464 ms. No current cardiac medications. She was started on Diltiazem at this visit.     EP Visit 6/3/22: She presents today for follow up. She reports feeling improved symptoms control with diltiazem. She still has some residual palpitations but much improved. She denies chest discomfort, abdominal fullness/bloating or peripheral edema, shortness of breath, paroxysmal nocturnal dyspnea, orthopnea, lightheadedness, dizziness, pre-syncope, or syncope. Current cardiac medication: Diltiazem.     EP Visit 3/24/23: She presents today for follow up. She reports feeling improved since we last saw her. She feels symptoms >80% improved. She still has some episodes about 4-5 times a month of short palpitations with chest pressure and lightheadedness. She states she cut out caffeine, lost weight, increased exercise and with the diltiazem feels her symptoms have improved. She is getting her IUD out to see if this will help improve some of her remaining symptoms. She denies chest discomfort, palpitations, abdominal fullness/bloating or peripheral edema, shortness of breath, paroxysmal nocturnal dyspnea, orthopnea, lightheadedness, dizziness, pre-syncope, or syncope. Presenting 12 lead ECG shows NSR Vent Rate 67 bpm,  ms,  ms, QTc 452 ms. Current cardiac medication:  "Diltiazem and Lisinopril.     She presents today for follow up. Zio patch monitor still in progress. She reports feeling Ok. She continues to have some \"anxiousness\" in her chest and some palpitations. She denies chest discomfort, abdominal fullness/bloating or peripheral edema, shortness of breath, paroxysmal nocturnal dyspnea, orthopnea, lightheadedness, dizziness, pre-syncope, or syncope. Presenting 12 lead ECG shows SR with PVCs Vent Rate 80 bpm,  ms,  ms, QTc 477 ms. Current cardiac medication: Diltiazem and Lisinopril.     I have reviewed and updated the patient's Past Medical History, Social History, Family History and Medication List.     Cardiographics (Personally Reviewed) :   11/2021 Echo ([facility] Report):  CONCLUSIONS   Patient is status post repair of tetralogy of Fallot.   1. Left ventricular ejection fraction is visually estimated at 65%.   Abnormal septal motion consistent with prior open heart surgery.   2. Moderate right ventricular dilation is present. Global right   ventricular systolic function is moderately reduced.   3. Mixed pulmonary valve disease. Pulmonic stenosis. Peak velocity   through the pulmonary valve is 2.5 m/sec and a peak gradient 27 mm of   Hg..   Probably severe pulmonic regurgitation.   4. Proximal ascending aorta measures at 3.9 cm.   Compared to the prior study dated June 11, 2019 , no significant   change was noted.     2/10/17 Echo:      CONCLUSIONS  Patient after transannular patch repair for tetralogy of Fallot. There is no  residual ventricular level shunt. There is mild to moderate right ventricular  enlargement. Normal right ventricular systolic function. Normal left  ventricular systolic function with a calculated biplane left ventricular  ejection fraction of 60-65%. Trivial tricuspid valve insufficiency. Estimated  right ventricular systolic pressure is 25-30 mmHg plus right atrial pressure.  There is mild flow acceleration across the pulmonary valve with " "a peak  gradient across the pulmonary valve of 15-20 mmHg. Moderate to severe (3-4+)  pulmonary valve insufficiency. There is mild dilation of the aortic root at  the level of the sinuses of Valsalva.    10/30/20 CMR (OSH Report):  Impression:   1. Normal left and right ventricular function, EF 55.6 and 46.9% respectively   2. Moderate left ventricular enlargement with severe right ventricular enlargement.   3. Overriding aortic root with mildly dilated sinus of Valsalva at 41.9 mm.   4. Moderate pulmonary regurgitation with a regurgitant volume of 44.2 mL, regurgitation fraction of 32.3%.   5. No residual VSD          Physical Examination   BP (!) 144/89 (BP Location: Right arm, Patient Position: Sitting, Cuff Size: Adult Regular)   Pulse 83   Ht 1.676 m (5' 5.98\")   Wt 88.8 kg (195 lb 11.2 oz)   LMP 11/11/2023 (Exact Date)   SpO2 100%   BMI 31.60 kg/m    Wt Readings from Last 3 Encounters:   11/16/23 86.9 kg (191 lb 9.3 oz)   03/24/23 95.4 kg (210 lb 4.8 oz)   03/24/23 95.4 kg (210 lb 4.8 oz)     General Appearance:   Alert, well-appearing and in no acute distress.   HEENT: Atraumatic, normocephalic. PERRL.  MMM.   Chest/Lungs:   Respirations unlabored.  Lungs are clear to auscultation.   Cardiovascular:   Regular. Murmur present.    Abdomen:  Soft, nontender, nondistended.   Extremities: No cyanosis or clubbing. No edema.    Musculoskeletal: Moves all extremities.  Gait normal.   Skin: Warm, dry, intact.    Neurologic: Mood and affect are appropriate.  Alert and oriented to person, place, time, and situation.          Medications  Allergies   Current Outpatient Medications   Medication Sig Dispense Refill    acetaminophen (TYLENOL) 325 MG tablet Take 325-650 mg by mouth      benzoyl peroxide 5 % external liquid       Blood Pressure Monitoring (BLOOD PRESSURE CUFF) MISC Please check blood pressures as needed. 1 each 0    clindamycin (CLEOCIN T) 1 % external solution       cyclobenzaprine (FLEXERIL) 5 MG " tablet Take 5-10 mg by mouth      diltiazem ER COATED BEADS (CARDIZEM CD/CARTIA XT) 120 MG 24 hr capsule TAKE ONE CAPSULE BY MOUTH ONCE DAILY. 90 capsule 3    losartan (COZAAR) 50 MG tablet Take 1 tablet (50 mg) by mouth daily 90 tablet 3    RETIN-A 0.025 % external cream       VENTOLIN  (90 Base) MCG/ACT inhaler       vitamin D2 (ERGOCALCIFEROL) 33961 units (1250 mcg) capsule       Allergies   Allergen Reactions    Blood Transfusion Related (Informational Only) Other (See Comments)     Patient has a history of a clinically significant antibody against RBC antigens.  A delay in compatible RBCs may occur.    Ketorolac Hives, Itching and Unknown     Tolerates ibuprofen without reaction      Ferumoxytol Difficulty breathing, Cramps, Hives, Itching and Muscle Pain (Myalgia)         Lab Results (Personally Reviewed)    Chemistry/lipid CBC Cardiac Enzymes/BNP/TSH/INR   Lab Results   Component Value Date    BUN 8.9 09/23/2023     09/23/2023    CO2 28 09/23/2023     Creatinine   Date Value Ref Range Status   09/23/2023 0.86 0.51 - 0.95 mg/dL Final   07/20/2016 0.74 0.52 - 1.04 mg/dL Final       Lab Results   Component Value Date    CHOL 171 05/06/2022    HDL 48 (L) 05/06/2022     (H) 05/06/2022      Lab Results   Component Value Date    WBC 12.8 (H) 11/30/2016    HGB  11/30/2016     CHANGE ORDER PER MD  CORRECTED ON 12/01 AT 0931: PREVIOUSLY REPORTED AS 8.1      HGB 8.1 (L) 11/30/2016    HCT 26.6 (L) 11/30/2016    MCV 86 11/30/2016     11/30/2016    Lab Results   Component Value Date    TSH 1.27 05/06/2022        The patient states understanding and is agreeable with the plan.   KATE Nathan CNP  Electrophysiology Consult Service  Pager: [phone]

## 2023-12-01 NOTE — LETTER
"12/1/2023      RE: Brittani Gaviria  [address]  [place] [address]       Dear Colleague,    Thank you for the opportunity to participate in the care of your patient, Brittani Gaviria, at the [facility] [employer] [facility]. Please see a copy of my visit note below.        [facility] VISIT    Assessment/Recommendations   Assessment/Plan:    Ms. Gaviria is a 33 year old female who has a past medical history significant for TOF s/p surgical repair at 11 months, s/p pulmonary valve repair at 7 years, s/p pulmonary valve replacement at age 13 years, and palpitations.     Palpitations:   Symptoms have improved with Diltiazem 180 mg CD daily, cutting out caffeine, weight loss and exercise. She still has some days that are more \"off\" from palpitations standpoint. Will give her PRN short acting diltiazem to take a dose if palpitations. Continued to encourage her with these efforts. She is seeing Dr. Wilkins upcoming to review her CMR.     Follow up in 6 months or sooner if need arises.          History of Present Illness/Subjective    Ms. Brittani Gaviria is a 33 year old female who comes in today for EP consultation of palpitations.    Ms. Gaviria is a 33 year old female who has a past medical history significant for TOF s/p surgical repair at 11 months, s/p pulmonary valve repair at 7 years, s/p pulmonary valve replacement at age 13 years, and palpitations.     She was born with TOF and underwent repair at 11 months. She required pulmonary valve repair at age 7 and the a pulmonary valve replacement at 13 years of age. Her last CMR at OSH showed normal biventricular function, severe RV dilation, moderate LV dilation, and moderate pulmonary valve regurgitation. She began developing palpitations. She had an episode in 11/2021 with palpitations and dizziness and had to sit down. She was evaluated at OSH and ECG and echo were " stable and no intervention was done. Her symptoms continued and she was seen again. A zio patch from OSH in 1/2022 reported to show 1 NSVT 6 beats, 45 PSVT up to 7.2 seconds, frequent PACs 14.4%, and rare isolated PVCs. 56 symptom activations reportedly corresponded to PAC/PVCs. She was started on Metoprolol. She had some weird hallucinations and metoprolol was lowered and this subsided. She started having some chest pains and metoprolol was decreased further but symptoms continued. Metoprolol was stopped in 4/5/22 due to this.     EP Visit 5/6/22: She reports feeling OK. She has noted her COOPER has progressed over last year. She continues to have palpitations many times a day for a minute or less. She denies chest discomfort, palpitations, abdominal fullness/bloating or peripheral edema, shortness of breath, paroxysmal nocturnal dyspnea, orthopnea, lightheadedness, dizziness, pre-syncope, or syncope. Presenting 12 lead ECG shows SR with PACs IVCD Vent Rate 73 bpm,  ms,  ms, QTc 464 ms. No current cardiac medications. She was started on Diltiazem at this visit.     EP Visit 6/3/22: She presents today for follow up. She reports feeling improved symptoms control with diltiazem. She still has some residual palpitations but much improved. She denies chest discomfort, abdominal fullness/bloating or peripheral edema, shortness of breath, paroxysmal nocturnal dyspnea, orthopnea, lightheadedness, dizziness, pre-syncope, or syncope. Current cardiac medication: Diltiazem.     EP Visit 3/24/23: She presents today for follow up. She reports feeling improved since we last saw her. She feels symptoms >80% improved. She still has some episodes about 4-5 times a month of short palpitations with chest pressure and lightheadedness. She states she cut out caffeine, lost weight, increased exercise and with the diltiazem feels her symptoms have improved. She is getting her IUD out to see if this will help improve some of her  "remaining symptoms. She denies chest discomfort, palpitations, abdominal fullness/bloating or peripheral edema, shortness of breath, paroxysmal nocturnal dyspnea, orthopnea, lightheadedness, dizziness, pre-syncope, or syncope. Presenting 12 lead ECG shows NSR Vent Rate 67 bpm,  ms,  ms, QTc 452 ms. Current cardiac medication: Diltiazem and Lisinopril.     She presents today for follow up. Zio patch monitor still in progress. She reports feeling Ok. She continues to have some \"anxiousness\" in her chest and some palpitations. She denies chest discomfort, abdominal fullness/bloating or peripheral edema, shortness of breath, paroxysmal nocturnal dyspnea, orthopnea, lightheadedness, dizziness, pre-syncope, or syncope. Presenting 12 lead ECG shows SR with PVCs Vent Rate 80 bpm,  ms,  ms, QTc 477 ms. Current cardiac medication: Diltiazem and Lisinopril.     I have reviewed and updated the patient's Past Medical History, Social History, Family History and Medication List.     Cardiographics (Personally Reviewed) :   11/2021 Echo (OSH Report):  CONCLUSIONS   Patient is status post repair of tetralogy of Fallot.   1. Left ventricular ejection fraction is visually estimated at 65%.   Abnormal septal motion consistent with prior open heart surgery.   2. Moderate right ventricular dilation is present. Global right   ventricular systolic function is moderately reduced.   3. Mixed pulmonary valve disease. Pulmonic stenosis. Peak velocity   through the pulmonary valve is 2.5 m/sec and a peak gradient 27 mm of   Hg..   Probably severe pulmonic regurgitation.   4. Proximal ascending aorta measures at 3.9 cm.   Compared to the prior study dated June 11, 2019 , no significant   change was noted.     2/10/17 Echo:      CONCLUSIONS  Patient after transannular patch repair for tetralogy of Fallot. There is no  residual ventricular level shunt. There is mild to moderate right ventricular  enlargement. Normal right " "ventricular systolic function. Normal left  ventricular systolic function with a calculated biplane left ventricular  ejection fraction of 60-65%. Trivial tricuspid valve insufficiency. Estimated  right ventricular systolic pressure is 25-30 mmHg plus right atrial pressure.  There is mild flow acceleration across the pulmonary valve with a peak  gradient across the pulmonary valve of 15-20 mmHg. Moderate to severe (3-4+)  pulmonary valve insufficiency. There is mild dilation of the aortic root at  the level of the sinuses of Valsalva.    10/30/20 CMR (OSH Report):  Impression:   1. Normal left and right ventricular function, EF 55.6 and 46.9% respectively   2. Moderate left ventricular enlargement with severe right ventricular enlargement.   3. Overriding aortic root with mildly dilated sinus of Valsalva at 41.9 mm.   4. Moderate pulmonary regurgitation with a regurgitant volume of 44.2 mL, regurgitation fraction of 32.3%.   5. No residual VSD          Physical Examination   BP (!) 144/89 (BP Location: Right arm, Patient Position: Sitting, Cuff Size: Adult Regular)   Pulse 83   Ht 1.676 m (5' 5.98\")   Wt 88.8 kg (195 lb 11.2 oz)   LMP 11/11/2023 (Exact Date)   SpO2 100%   BMI 31.60 kg/m    Wt Readings from Last 3 Encounters:   11/16/23 86.9 kg (191 lb 9.3 oz)   03/24/23 95.4 kg (210 lb 4.8 oz)   03/24/23 95.4 kg (210 lb 4.8 oz)     General Appearance:   Alert, well-appearing and in no acute distress.   HEENT: Atraumatic, normocephalic. PERRL.  MMM.   Chest/Lungs:   Respirations unlabored.  Lungs are clear to auscultation.   Cardiovascular:   Regular. Murmur present.    Abdomen:  Soft, nontender, nondistended.   Extremities: No cyanosis or clubbing. No edema.    Musculoskeletal: Moves all extremities.  Gait normal.   Skin: Warm, dry, intact.    Neurologic: Mood and affect are appropriate.  Alert and oriented to person, place, time, and situation.          Medications  Allergies   Current Outpatient Medications "   Medication Sig Dispense Refill    acetaminophen (TYLENOL) 325 MG tablet Take 325-650 mg by mouth      benzoyl peroxide 5 % external liquid       Blood Pressure Monitoring (BLOOD PRESSURE CUFF) MISC Please check blood pressures as needed. 1 each 0    clindamycin (CLEOCIN T) 1 % external solution       cyclobenzaprine (FLEXERIL) 5 MG tablet Take 5-10 mg by mouth      diltiazem ER COATED BEADS (CARDIZEM CD/CARTIA XT) 120 MG 24 hr capsule TAKE ONE CAPSULE BY MOUTH ONCE DAILY. 90 capsule 3    losartan (COZAAR) 50 MG tablet Take 1 tablet (50 mg) by mouth daily 90 tablet 3    RETIN-A 0.025 % external cream       VENTOLIN  (90 Base) MCG/ACT inhaler       vitamin D2 (ERGOCALCIFEROL) 71867 units (1250 mcg) capsule       Allergies   Allergen Reactions    Blood Transfusion Related (Informational Only) Other (See Comments)     Patient has a history of a clinically significant antibody against RBC antigens.  A delay in compatible RBCs may occur.    Ketorolac Hives, Itching and Unknown     Tolerates ibuprofen without reaction      Ferumoxytol Difficulty breathing, Cramps, Hives, Itching and Muscle Pain (Myalgia)         Lab Results (Personally Reviewed)    Chemistry/lipid CBC Cardiac Enzymes/BNP/TSH/INR   Lab Results   Component Value Date    BUN 8.9 09/23/2023     09/23/2023    CO2 28 09/23/2023     Creatinine   Date Value Ref Range Status   09/23/2023 0.86 0.51 - 0.95 mg/dL Final   07/20/2016 0.74 0.52 - 1.04 mg/dL Final       Lab Results   Component Value Date    CHOL 171 05/06/2022    HDL 48 (L) 05/06/2022     (H) 05/06/2022      Lab Results   Component Value Date    WBC 12.8 (H) 11/30/2016    HGB  11/30/2016     CHANGE ORDER PER MD  CORRECTED ON 12/01 AT 0931: PREVIOUSLY REPORTED AS 8.1      HGB 8.1 (L) 11/30/2016    HCT 26.6 (L) 11/30/2016    MCV 86 11/30/2016     11/30/2016    Lab Results   Component Value Date    TSH 1.27 05/06/2022        The patient states understanding and is agreeable with  the plan.   KATE Nathan CNP  Electrophysiology Consult Service  Pager: [phone]

## 2023-12-01 NOTE — NURSING NOTE
Chief Complaint   Patient presents with    Follow Up     Return congenital heart        Vitals were taken, medications reconciled and EKG performed.    Yareli Hough CMA  1:46 PM

## 2023-12-05 LAB
ATRIAL RATE - MUSE: 80 BPM
DIASTOLIC BLOOD PRESSURE - MUSE: NORMAL MMHG
INTERPRETATION ECG - MUSE: NORMAL
P AXIS - MUSE: 5 DEGREES
PR INTERVAL - MUSE: 130 MS
QRS DURATION - MUSE: 134 MS
QT - MUSE: 414 MS
QTC - MUSE: 477 MS
R AXIS - MUSE: 77 DEGREES
SYSTOLIC BLOOD PRESSURE - MUSE: NORMAL MMHG
T AXIS - MUSE: 41 DEGREES
VENTRICULAR RATE- MUSE: 80 BPM

## 2023-12-07 NOTE — PROGRESS NOTES
"Adult Congenital Heart Disease Clinic  2023    HPI:   Ms. Brittani Gaviria is a 33yr old female with a history of Tetralogy of Fallot (s/p surgical repair with transannular patch at 11 months of age, s/p pulmonary valve repair at 7yrs of age, s/p pulmonary valve replacement at 13yrs of age), moderate-severe pulmonary insufficiency, HTN, hyperparathyroidism, and palpitations who presents to clinic for routine follow-up.    She was last seen in the [facility] clinic 3/2023, where she was found to be well, and no medication changes were made.  She was then seen by [facility] EP 23, where she was found to be well.  She was advised to continue taking diltiazem daily for palpitations, and was given an additional short-acting dose for PRN use.  She presents today for routine follow-up.    Cardiac history:  She notes that she was delivered via  in [place] at term.  She was diagnosed with Tetralogy of Fallot shortly after birth, but \"had to wait to grow\" before intervention, and ultimately underwent complete surgical repair at 11 months of age.  She then underwent pulmonary valve repair at 7yrs of age and pulmonary valve replacement at 13yrs of age (her first two procedures were performed at [facility], which is now closed, and the last at [facility] in [place], [place]).  She has never been anticoagulated.  She does not recall any significant cardiac limitations growing up.  She followed with the care team at [place] until she was 17, then with [facility] in [place] (age 18-21) and [place], [place].  She has had 4 pregnancies -- first pregnancy at age 20 was uncomplicated, second at age 21 was uncomplicated, third pregnancy at age 24 resulted in miscarriage at 13 weeks, and fourth pregnancy delivered via  due to prior  at [facility].      Since her last visit, she states that she feels ok overall.  She has been exercising, and is up to doing 30 " mins at home, 3x/week.  She does not note SOB when exercising, but notes that she is more SOB with daily activities now than when compared to 1 month ago.  She notes a prior h/o anemia, for which she has received IV iron, and notes that she is typically SOB when anemic.  She is to have iron studies drawn per her PCP, but has not yet done so.  She is able to lie flat without PND and orthopnea, but does awaken at night intermittently with a cough.  She continues to note palpitations, but states that she saw EP last week where she was given additional prn diltiazem.  In the last week, she has taken one prn dose per day, and notes that her palpitations have improved.  She has been monitoring her diet, and has completely eliminated caffeine.  She is eating regularly, without vomiting, diarrhea, and constipation.  She gets nauseated when she has migraines, and notes that her migraines had been daily and are now down to 2x/week.  She has been trying to lose weight, noting that she was 220# and is now down to 194#.  She notes intermittent LE edema, which self-resolves.  Her BPs are 130-140/90s, when checked.  She had stopped her losartan, but has resumed and has been taking regularly for the last month.  She has ongoing chest pain, and wonders if this could be related to GERD.  She gets dizzy, and has been found to have vestibular issues for which she is doing PT and has noted some improvement.  She otherwise denies falls, headaches, acute vision changes, fevers, chills, sore throat, and signs of bleeding.      PAST MEDICAL HISTORY:  Past Medical History:   Diagnosis Date     Carpal tunnel syndrome     right, conservative management      Chlamydia 2011     Cholelithiasis 2014    resolved     Hyperparathyroidism (H24) 7/21/2016     Iron deficiency anemia 2015     Kidney stones      Migraines      Other and unspecified ovarian cyst      Rh sensitization 7/21/2016     Tetralogy of Fallot     surgical correction       FAMILY  HISTORY:  Family History   Problem Relation Age of Onset     Diabetes Maternal Grandmother      Hypertension Father      Hypertension Sister      Multiple Sclerosis Mother      Cerebrovascular Disease Mother      Coronary Artery Disease No family hx of      Depression No family hx of      Anxiety Disorder No family hx of      Anesthesia Reaction No family hx of      Cancer No family hx of      Thyroid Disease No family hx of      Glaucoma No family hx of      Macular Degeneration No family hx of        SOCIAL HISTORY:  Social History     Socioeconomic History     Marital status:      Spouse name: Pascual     Number of children: 2   Occupational History     Occupation: pharmacy tech     Comment: [employer]   Tobacco Use     Smoking status: Never     Passive exposure: Never     Smokeless tobacco: Never   Substance and Sexual Activity     Alcohol use: No     Alcohol/week: 0.0 standard drinks of alcohol     Drug use: No     Sexual activity: Yes     Partners: Male     Birth control/protection: None   Social History Narrative    From [place] and [place].        CURRENT MEDICATIONS:  Current Outpatient Medications   Medication Sig Dispense Refill     acetaminophen (TYLENOL) 325 MG tablet Take 325-650 mg by mouth       amoxicillin (AMOXIL) 500 MG capsule TAKE FOUR CAPSULES BY MOUTH 1 HOUR BEFORE DENTAL PROCEDURE       benzoyl peroxide 5 % external liquid        Blood Pressure Monitoring (BLOOD PRESSURE CUFF) MISC Please check blood pressures as needed. 1 each 0     cholecalciferol (VITAMIN D3) 125 mcg (5000 units) capsule Take 1 capsule by mouth daily       clindamycin (CLEOCIN T) 1 % external solution        cyclobenzaprine (FLEXERIL) 5 MG tablet Take 5-10 mg by mouth       diltiazem (CARDIZEM) 60 MG tablet Take 1 tablet (60 mg) by mouth 3 times daily as needed (palpitations) 90 tablet 1     diltiazem ER COATED BEADS (CARDIZEM CD/CARTIA XT) 120 MG 24 hr capsule TAKE ONE CAPSULE BY MOUTH ONCE DAILY. 90 capsule 3      fluticasone (FLONASE) 50 MCG/ACT nasal spray 2 sprays       losartan (COZAAR) 50 MG tablet Take 1 tablet (50 mg) by mouth daily 90 tablet 3     naratriptan (AMERGE) 2.5 MG tablet 1 tab at the onset of migraine. May repeat in 4 hrs. Max 2 tabs/24 hrs, 2-3 day/week, 9 days/month.       ondansetron (ZOFRAN ODT) 4 MG ODT tab 1 to 2 tabs by mouth up to twice a day as needed for nausea symptoms related to migraine. Max 9 days per month.       RETIN-A 0.025 % external cream        VENTOLIN  (90 Base) MCG/ACT inhaler  (Patient not taking: Reported on 12/1/2023)       vitamin D2 (ERGOCALCIFEROL) 03272 units (1250 mcg) capsule  (Patient not taking: Reported on 12/1/2023)       EXAM:  BP (!) 143/85 (BP Location: Right arm, Patient Position: Chair, Cuff Size: Adult Regular)   Pulse 83   Wt 88.3 kg (194 lb 9.6 oz)   LMP 11/11/2023 (Exact Date)   SpO2 99%   BMI 31.42 kg/m    General: appears comfortable, alert and articulate  Head: normocephalic, atraumatic  Eyes: anicteric sclera, EOMI  Neck: no adenopathy  Orophyarynx: moist mucosa, no lesions, dentition intact  Heart: regular with occasional ectopy, +click, +systolic murmur best heard at LUSB, JVD negative when sitting upright  Lungs: clear, no rales or wheezing  Abdomen: soft, non-tender, bowel sounds present, no hepatosplenomegaly  Extremities: no clubbing, cyanosis or LE edema  Neurological: normal speech and affect, no gross motor deficits  Integument: no open lesions, rashes, or jaundice    A 12 lead EKG was performed today and revealed NSR at a rate of 84 bpm. There is RBBB with QRS duration 130 msecsinus arrhythmia with rare PAC's.     Procedures:  Chest MRA/cMRI:  11/2023  RDEVI 113ml/m2  cMRI  1. The left ventricle is normal in cavity size and wall thickness. The global systolic function is normal.  The LVEF is 63%. There are no regional wall motion abnormalities. There is no residual ventricular septal  defect.   2. The right ventricle is mild to  moderately dilated.  The global systolic function is normal. The RVEF is  54%. Post transannular patch repair.   3. The right atrium is mildly enlarged. The left atrium is normal in size.  4. The pulmonary valve shows mildly restricted leaflet motion with moderate pulmonary insufficiency. The  regurgitant volume is 47 mL, regurgitation fraction 39%, peak pressure gradient is 18 mmHg.  5. There is no myocardial fibrosis on late gadolinium enhancement imaging.   6. There is no pericardial effusion.  7. There is no intracardiac thrombus.     MRA Chest  1. The aortic root is overriding and mildly dilated measuring 4.1 cm. The ascending aorta, transverse arch  and descending thoracic aorta are normal in size without an aneurysm or dissection.  2. The aortic arch is left sided. There is normal branching of the arch vessels. There is no coarctation.  3. The main PA and the proximal branch pulmonary arteries are normal in size.       TTE:  3/2023  Patient after transannular patch repair for tetralogy of Fallot. There is no  residual ventricular level shunt. There is mild to moderate right ventricular  enlargement. Normal right ventricular systolic function. Qualitatively normal  left ventricular systolic function. Trivial tricuspid valve insufficiency.  There is mild flow acceleration across the pulmonary valve with a peak  gradient across the pulmonary valve of 22 mmHg. Moderate to severe (3-4+)  pulmonary valve insufficiency. There is mild dilation of the aortic root at  the level of the sinuses of Valsalva.      Chest MRA/cMRI:  5/2022  Repaired Tetralogy of Fallot, moderate-severe PI (RV 45 ml, RF 40%) and mild PS (peak gr 16  mmHg), moderately dilated RV with normal function (RV EDVI 120 ml/m2, RVEF 53%). Mildly dilated aortic root  measuring 4.1 cm. No significant change compared to study from 2020.     CPX  Peak VO2 (ml/kg/min) VE/VCO2  Greene RER   17.90        30.54        1.16            Predicted VO2 (ml/kg/min)  Predicted VO2 %   32.50        55            Resting Supine BP (mmHg) Resting Standing BP (mmHg) Final Stress BP (mmHg)   148/96        138/90        208/96          Resting Supine HR (bpm) Resting Standing HR (bpm)    72        80             Max HR (bpm) Max Predicted HR (bpm) Max Perdicted HR %   167        186        90            Exercise time (min) Estimated workload (METS)   7        5.1             A cardiopulmonary stress test was performed following a Mac ramp protocol with the patient exercising for 7 minutes under the supervision of Dr. Becerril.  Heart rate demonstrated a normal response to exercise.  Blood pressure demonstrated a hypertensive response to exercise.     The stress electrocardiogram is negative for inducible ischemic EKG changes.     The patient's exercise capacity is severely impaired.     A prior study was conducted on 11/16/2022. When compared to the previous test, this represents an 11% decrease in functional capacity.     The patient achieved a severely impaired, class II functional capacity with a cardiac limitation to exercise. A cardiac limitation is suggested given the low peak VO2 and oxygen pulse as well as frequent ectopy, including a 6 beat run of ventricular tachycardia.        Assessment and Plan:   Ms. Brittani Gaviria is a 33yr old female with a history of Tetralogy of Fallot (s/p surgical repair with transannular patch at 11 months of age, s/p pulmonary valve repair at 7yrs of age, s/p pulmonary valve replacement at 13yrs of age), moderate-severe pulmonary insufficiency, HTN, hyperparathyroidism, and palpitations who presents to clinic for routine follow-up.    Labs from 9/2023 showed normal electrolytes, renal function, and liver function.    Chest MRA/cMRI from 11/2023 showed normal biventricular function (LVEF 63%, RVEF 54%), stable moderate pulmonary insufficiency (regurgitant volume 47mL/regurgitant fraction 39%), RV volume of 112mL/m^2, and stable dilation of  aortic root (4.1cm).    CPX from 11/2023 was negative for inducible ischemia but revealed severely impaired functional capacity, decreased from prior.  Her findings supported a cardiac limitation, as her peak VO2 was low and she had frequent ectopy, including a 6-beat run of VT.    Ms. Gaviria appears stable today.  Her weight is overall stable, and she appears euvolemic today.  Her BPs remain elevated.    Advised that she start spironolactone 25mg daily to help preserve her biventricular function, which will also hopefully help alleviate any LE edema/fluid retention.  Note that she has previously been on hydrochlorothiazide, which was stopped for unclear reasons.    She has not been taking an aspirin, so advised that she should start 81mg daily given that she has had a PVR.    As she remains SOB, has palpitations, and with her cMRI findings, will plan to discuss her for possible valve intervention (ideally, transcatheter).  We will start by requesting her last op report from [place], to determine which valve she currently has.  She understands that intervention is not urgent, and that her cMRI findings remain stable at this time.  As we consider possible valve intervention, she will need a chest CTA to review her current valve seating and coronary arteries.    We will plan for her to repeat a CMP, CBC with diff, and iron studies in 1-2 weeks.  If she is anemic, we will plan to refer her to our Anemia Clinic for further management.    She notes possible GERD symptoms, and has been taking omeprazole prn.  Discussed that she should take this daily for optimal effect.    We will plan for her to return to clinic in 6-12 months, with a repeat echo.  We will then plan to repeat a chest MRA/cMRI in 2 years.      Tetralogy of Fallot  s/p pulmonary valve repair (7yrs of age) and s/p pulmonary valve replacement (13yrs of age)  Moderate pulmonary insufficiency  Mildly dilated aortic root  HTN  s/p surgical repair at 11 months  of age, s/p pulmonary valve repair at 7yrs of age, s/p pulmonary valve replacement at 13yrs of age.  Chest MRA/cMRI from 11/2023 showed normal biventricular function (LVEF 63%, RVEF 54%), stable moderate pulmonary insufficiency (regurgitant volume 47mL/regurgitant fraction 39%), RV volume of 112mL/m^2, and stable dilation of aortic root (4.1cm).  - continue SBE ppx with amoxicillin  - continue losartan 50mg daily  - starting spironolactone 25mg daily  - start aspirin 81mg daily  - working to retreive op notes from her PVR  - will consider her for possible valve intervention, if moving forward, will need a chest CTA prior (see above)    Palpitations  Follows with EP, on diltiazem 120mg daily plus 60mg po TID prn.  Recent Zios from 11/2023 remains in progress, will send a message to EP team re: this.  Notes improvement with 120mg daily plus one prn dose of 60mg daily.  Asked that she call if her sxs were to worsen.    Severe functional capacity  CPX from 11/2023 revealed a severe, class II functional capacity, with an 11% decrease when compared to prior study.  Her EKG was negative for inducible ischemia, but findings were consistent with a cardiac limitation given a low peak VO2 and frequent ectopy, including a 6-beat run of VT.       The above was reviewed with Ms. Gaviria, who verbalized understanding and will call with further questions/concerns.    45 minutes spent with patient, along with preparing for visit, reviewing follow up, and completing documentation.      Farrah Veloz DNP, [facility]  Advanced Heart Failure Nurse Practitioner  [facility], Neil Wilkins MD, saw this patient with Farrah Veloz DNP and agree with the  findings and plan of care as documented in this note. Given her symptomatic arrhythmias and reduced exercise capacity Ms Gaviria may benefit from a pulmonary valve replacement and we have more transcatheter options that in the past.     We will plan on getting a TPVR protocol  valve CTA and review her case at [facility] conference.   Will add spironolactone at this time, otherwise no med changes. COntinue rhythm surveillance and F/U with EP given VT with exercise. Will refer to anemia clinic for treatment of iron deficiency anemia. COnsider repeat CPX when no longer iron deficienct.     I have I have reviewed this patient's history, examined the patient and reviewed relevant laboratory findings and diagnostic testing. I have discussed the plan of care with the patient  at the time of this visit.     Neil Freeman M.D.   of Pediatrics  Pediatric and Adult Congenital Cardiology  [facility]  Pediatric Cardiology Office [phone]  Adult Congenital Cardiology Triage and Scheduling [phone]    A total of 45 minutes were spent in personal review of testing, including CPX, MRI and labs review of documented history and interval events in chart, face to face visit with discussion of findings and plan, and care coordination.       CC  Patient Care Team:  Leila Tay DO as PCP - General  Deyanira Lynn APRN CNP as Assigned Heart and Vascular Provider  NEIL FREEMAN

## 2023-12-08 ENCOUNTER — OFFICE VISIT (OUTPATIENT)
Dept: CARDIOLOGY | Facility: CLINIC | Age: 33
End: 2023-12-08
Payer: COMMERCIAL

## 2023-12-08 ENCOUNTER — MYC MEDICAL ADVICE (OUTPATIENT)
Dept: CARDIOLOGY | Facility: CLINIC | Age: 33
End: 2023-12-08

## 2023-12-08 ENCOUNTER — MYC MEDICAL ADVICE (OUTPATIENT)
Dept: ONCOLOGY | Facility: CLINIC | Age: 33
End: 2023-12-08

## 2023-12-08 VITALS
WEIGHT: 194.6 LBS | BODY MASS INDEX: 31.42 KG/M2 | HEART RATE: 83 BPM | OXYGEN SATURATION: 99 % | DIASTOLIC BLOOD PRESSURE: 85 MMHG | SYSTOLIC BLOOD PRESSURE: 143 MMHG

## 2023-12-08 DIAGNOSIS — Q21.3 TETRALOGY OF FALLOT: ICD-10-CM

## 2023-12-08 DIAGNOSIS — Z29.8 * * * SBE PROPHYLAXIS * * *: Primary | ICD-10-CM

## 2023-12-08 DIAGNOSIS — I37.1 PULMONARY VALVE INSUFFICIENCY, UNSPECIFIED ETIOLOGY: ICD-10-CM

## 2023-12-08 DIAGNOSIS — I10 ESSENTIAL HYPERTENSION: ICD-10-CM

## 2023-12-08 DIAGNOSIS — I51.7 ENLARGED RV (RIGHT VENTRICLE): ICD-10-CM

## 2023-12-08 LAB
ATRIAL RATE - MUSE: 84 BPM
DIASTOLIC BLOOD PRESSURE - MUSE: NORMAL MMHG
INTERPRETATION ECG - MUSE: NORMAL
P AXIS - MUSE: 4 DEGREES
PR INTERVAL - MUSE: 122 MS
QRS DURATION - MUSE: 128 MS
QT - MUSE: 398 MS
QTC - MUSE: 470 MS
R AXIS - MUSE: 83 DEGREES
SYSTOLIC BLOOD PRESSURE - MUSE: NORMAL MMHG
T AXIS - MUSE: 55 DEGREES
VENTRICULAR RATE- MUSE: 84 BPM

## 2023-12-08 PROCEDURE — G0463 HOSPITAL OUTPT CLINIC VISIT: HCPCS

## 2023-12-08 PROCEDURE — 99215 OFFICE O/P EST HI 40 MIN: CPT

## 2023-12-08 PROCEDURE — 93010 ELECTROCARDIOGRAM REPORT: CPT | Performed by: INTERNAL MEDICINE

## 2023-12-08 PROCEDURE — 93005 ELECTROCARDIOGRAM TRACING: CPT

## 2023-12-08 RX ORDER — SPIRONOLACTONE 25 MG/1
25 TABLET ORAL DAILY
Qty: 90 TABLET | Refills: 3 | Status: SHIPPED | OUTPATIENT
Start: 2023-12-08

## 2023-12-08 ASSESSMENT — PAIN SCALES - GENERAL: PAINLEVEL: MILD PAIN (3)

## 2023-12-08 NOTE — PATIENT INSTRUCTIONS
You were seen today in the Adult Congenital and Cardiovascular Genetics Clinic at the [facility].    Cardiology Providers you saw during your visit:  Neil Wilkins MD and Farrah Veloz NP    Diagnosis:  ToF    Results:  Neil Wilkins MD reviewed the results of your cMRI/MRA and CPX testing today in clinic.    Recommendations for you:    START spironolactone 25mg daily  Please have labs drawn in 2 weeks from now to see how the spironolactone is working for you. You can either call us to schedule, or schedule online on the [employer] website.   Continue your diltiazem, and please take omeprazole every day.  Start aspirin 81mg daily.  We recommend that you take the enteric coated version, in the morning, and with food.  We will make a referral for you to the anemia clinic.   If you don t hear from a representative within 2 business days, please call [phone]   We will work to find your surgical records from [place] in [place].     General Cardiac Recommendations:  Continue to eat a heart healthy, low salt diet.  Continue to get 20-30 minutes of aerobic activity, 4-5 days per week.  Examples of aerobic activity include walking, running, swimming, cycling, etc.  Continue to observe good oral hygiene, with regular dental visits.      SBE prophylaxis:   Yes__x__  No____    If YES is checked, follow the recommendations outlined below:  Take antibiotic(s) prior to recommended dental procedures and procedures on the respiratory tract or with infected skin, muscle or bones. SBE prophylaxis is not needed for routine GI and  procedures (ie. Colonoscopy or vaginal delivery)  Observe good oral hygiene daily, as advised by your dentist. Get regular professional dental care.  Keep cuts clean.  Infections should be treated promptly.  Symptoms of Infective Endocarditis could include: fever lasting more than 4-5 days or a recurrent fever that initially resolves but returns within 1-2 days)      Exercise restrictions:    Yes__X__  No____         If yes, list restrictions:  Must be allowed to rest if fatigued or SOB      FASTING CHOLESTEROL was checked in the last 5 years YES__x__  NO___ (2023)  If no, please follow up with your primary care physician. You should have a cholesterol screening every 5 years.      Follow-up:  Follow up with Dr. Wilkins in 1 year with an ECHO and EKG prior. We will plan to repeat a chest MRA and cardiac MRI in 2 years.    If you have questions or concerns please contact us at:    Ileana Cheney RN, BSN    Sindi Méndez (Scheduling)  Nurse Care Coordinator     Clinic   Adult Congenital and CV Genetics   Adult Congenital and CV Genetic  [facility]  (P) [phone]     (P) [phone]  (F) [phone]     (F) [phone]      For after hours urgent needs, call [phone] and ask to speak to the Adult Congenital Physician on call.  Mention Job Code [id number].    For emergencies call 911.    [facility]  [facility]   Clinics and Surgery Center  Mail Code [id number]  [address] [place] [address]  [id number]

## 2023-12-08 NOTE — Clinical Note
"2023      RE: Brittani Gaviria  [address]  [place] [address]       Dear Colleague,    Thank you for the opportunity to participate in the care of your patient, Brittani Gaviria, at the [facility] [place] [facility]. Please see a copy of my visit note below.    Adult Congenital Heart Disease Clinic  2023    HPI:   Ms. Brittani Gaviria is a 33yr old female with a history of Tetralogy of Fallot (s/p surgical repair with transannular patch at 11 months of age, s/p pulmonary valve repair at 7yrs of age, s/p pulmonary valve replacement at 13yrs of age), moderate-severe pulmonary insufficiency, HTN, hyperparathyroidism, and palpitations who presents to clinic for routine follow-up.    She was last seen in the [facility] clinic 3/2023, where she was found to be well, and no medication changes were made.  She was then seen by [facility] EP 23, where she was found to be well.  She was advised to continue taking diltiazem daily for palpitations, and was given an additional short-acting dose for PRN use.  She presents today for routine follow-up.    Cardiac history:  She notes that she was delivered via  at term in [place] at term.  She was diagnosed with Tetralogy of Fallot shortly after birth, but \"had to wait to grow\" before intervention, and ultimately underwent complete surgical repair at 11 months of age.  She then underwent pulmonary valve repair at 7yrs of age and pulmonary valve replacement at 13yrs of age (her first two procedures were performed at [facility], which is now closed, and the last at [facility] in [place], [place]).  She has never been anticoagulated.  She does not recall any significant cardiac limitations growing up.  She followed with the care team at [facility] until she was 17, then with [facility] in [place] (age 18-21) and [place], [place].  She has had 4 " pregnancies -- first pregnancy at age 20 was uncomplicated, second at age 21 was uncomplicated, third pregnancy at age 24 resulted in miscarriage at 13 weeks, and fourth pregnancy delivered via  due to prior  at [facility].      Since her last visit, she states ***      PAST MEDICAL HISTORY:  Past Medical History:   Diagnosis Date     Carpal tunnel syndrome     right, conservative management      Chlamydia 2011     Cholelithiasis     resolved     Hyperparathyroidism (H24) 2016     Iron deficiency anemia      Kidney stones      Migraines      Other and unspecified ovarian cyst      Rh sensitization 2016     Tetralogy of Fallot     surgical correction       FAMILY HISTORY:  Family History   Problem Relation Age of Onset     Diabetes Maternal Grandmother      Hypertension Father      Hypertension Sister      Multiple Sclerosis Mother      Cerebrovascular Disease Mother      Coronary Artery Disease No family hx of      Depression No family hx of      Anxiety Disorder No family hx of      Anesthesia Reaction No family hx of      Cancer No family hx of      Thyroid Disease No family hx of      Glaucoma No family hx of      Macular Degeneration No family hx of        SOCIAL HISTORY:  Social History     Socioeconomic History     Marital status:      Spouse name: Pascual     Number of children: 2   Occupational History     Occupation: pharmacy tech     Comment: [employer]   Tobacco Use     Smoking status: Never     Passive exposure: Never     Smokeless tobacco: Never   Substance and Sexual Activity     Alcohol use: No     Alcohol/week: 0.0 standard drinks of alcohol     Drug use: No     Sexual activity: Yes     Partners: Male     Birth control/protection: None   Social History Narrative    From [place] and [place].        CURRENT MEDICATIONS:  Current Outpatient Medications   Medication Sig Dispense Refill     acetaminophen (TYLENOL) 325 MG tablet Take 325-650 mg by mouth        amoxicillin (AMOXIL) 500 MG capsule TAKE FOUR CAPSULES BY MOUTH 1 HOUR BEFORE DENTAL PROCEDURE       benzoyl peroxide 5 % external liquid        Blood Pressure Monitoring (BLOOD PRESSURE CUFF) MISC Please check blood pressures as needed. 1 each 0     cholecalciferol (VITAMIN D3) 125 mcg (5000 units) capsule Take 1 capsule by mouth daily       clindamycin (CLEOCIN T) 1 % external solution        cyclobenzaprine (FLEXERIL) 5 MG tablet Take 5-10 mg by mouth       diltiazem (CARDIZEM) 60 MG tablet Take 1 tablet (60 mg) by mouth 3 times daily as needed (palpitations) 90 tablet 1     diltiazem ER COATED BEADS (CARDIZEM CD/CARTIA XT) 120 MG 24 hr capsule TAKE ONE CAPSULE BY MOUTH ONCE DAILY. 90 capsule 3     fluticasone (FLONASE) 50 MCG/ACT nasal spray 2 sprays       losartan (COZAAR) 50 MG tablet Take 1 tablet (50 mg) by mouth daily 90 tablet 3     naratriptan (AMERGE) 2.5 MG tablet 1 tab at the onset of migraine. May repeat in 4 hrs. Max 2 tabs/24 hrs, 2-3 day/week, 9 days/month.       ondansetron (ZOFRAN ODT) 4 MG ODT tab 1 to 2 tabs by mouth up to twice a day as needed for nausea symptoms related to migraine. Max 9 days per month.       RETIN-A 0.025 % external cream        VENTOLIN  (90 Base) MCG/ACT inhaler  (Patient not taking: Reported on 12/1/2023)       vitamin D2 (ERGOCALCIFEROL) 26141 units (1250 mcg) capsule  (Patient not taking: Reported on 12/1/2023)         ROS:   A comprehensive ROS was completed and found to be negative except for that noted in the HPI.    EXAM:  [facility] 11/11/2023 (Exact Date)   General: appears comfortable, alert and articulate  Head: normocephalic, atraumatic  Eyes: anicteric sclera, EOMI  Neck: no adenopathy  Orophyarynx: moist mucosa, no lesions, dentition intact  Heart: regular, S1/S2, no murmur, gallop, rub, JVD ***  Lungs: clear, no rales or wheezing  Abdomen: soft, non-tender, bowel sounds present, no hepatosplenomegaly  Extremities: no clubbing, cyanosis or  edema  Neurological: normal speech and affect, no gross motor deficits  Integument: no open lesions, rashes, or jaundice    Labs:  CBC RESULTS:  Lab Results   Component Value Date    WBC 12.8 (H) 11/30/2016    RBC 3.08 (L) 11/30/2016    HGB  11/30/2016     CHANGE ORDER PER MD  CORRECTED ON 12/01 AT 0931: PREVIOUSLY REPORTED AS 8.1      HGB 8.1 (L) 11/30/2016    HCT 26.6 (L) 11/30/2016    MCV 86 11/30/2016    MCH 26.3 (L) 11/30/2016    MCHC 30.5 (L) 11/30/2016    RDW 13.0 11/30/2016     11/30/2016       CMP RESULTS:  Lab Results   Component Value Date     09/23/2023     07/20/2016    POTASSIUM 4.0 09/23/2023    POTASSIUM 3.7 07/20/2016    CHLORIDE 106 09/23/2023    CHLORIDE 107 07/20/2016    CO2 28 09/23/2023    CO2 24 07/20/2016    ANIONGAP 7 09/23/2023    ANIONGAP 7 07/20/2016     (H) 09/23/2023    GLC 84 07/20/2016    BUN 8.9 09/23/2023    BUN 7 07/20/2016    CR 0.86 09/23/2023    CR 0.74 07/20/2016    GFRESTIMATED 90 09/23/2023    GFRESTIMATED >90  Non  GFR Calc   07/20/2016    GFRESTBLACK >90   GFR Calc   07/20/2016    LISBET 10.0 09/23/2023    LISBET 12.6 (H) 07/20/2016    BILITOTAL 0.6 09/23/2023    BILITOTAL 0.8 07/20/2016    ALBUMIN 4.1 09/23/2023    ALBUMIN 3.4 07/20/2016    ALKPHOS 58 09/23/2023    ALKPHOS 50 07/20/2016    ALT 14 09/23/2023    ALT 21 07/20/2016    AST 16 09/23/2023    AST 15 07/20/2016        Procedures:  Chest MRA/cMRI:  11/2023  cMRI  1. The left ventricle is normal in cavity size and wall thickness. The global systolic function is normal.  The LVEF is 63%. There are no regional wall motion abnormalities. There is no residual ventricular septal  defect.   2. The right ventricle is mild to moderately dilated.  The global systolic function is normal. The RVEF is  54%. Post transannular patch repair.   3. The right atrium is mildly enlarged. The left atrium is normal in size.  4. The pulmonary valve shows mildly restricted leaflet motion  with moderate pulmonary insufficiency. The  regurgitant volume is 47 mL, regurgitation fraction 39%, peak pressure gradient is 18 mmHg.  5. There is no myocardial fibrosis on late gadolinium enhancement imaging.   6. There is no pericardial effusion.  7. There is no intracardiac thrombus.     MRA Chest  1. The aortic root is overriding and mildly dilated measuring 4.1 cm. The ascending aorta, transverse arch  and descending thoracic aorta are normal in size without an aneurysm or dissection.  2. The aortic arch is left sided. There is normal branching of the arch vessels. There is no coarctation.  3. The main PA and the proximal branch pulmonary arteries are normal in size.       TTE:  3/2023  Patient after transannular patch repair for tetralogy of Fallot. There is no  residual ventricular level shunt. There is mild to moderate right ventricular  enlargement. Normal right ventricular systolic function. Qualitatively normal  left ventricular systolic function. Trivial tricuspid valve insufficiency.  There is mild flow acceleration across the pulmonary valve with a peak  gradient across the pulmonary valve of 22 mmHg. Moderate to severe (3-4+)  pulmonary valve insufficiency. There is mild dilation of the aortic root at  the level of the sinuses of Valsalva.      Chest MRA/cMRI:  5/2022  Repaired Tetralogy of Fallot, moderate-severe PI (RV 45 ml, RF 40%) and mild PS (peak gr 16  mmHg), moderately dilated RV with normal function (RV EDVI 120 ml/m2, RVEF 53%). Mildly dilated aortic root  measuring 4.1 cm. No significant change compared to study from 2020.       Assessment and Plan:   Ms. Brittani Gaviria is a 33yr old female with a history of Tetralogy of Fallot (s/p surgical repair with transannular patch at 11 months of age, s/p pulmonary valve repair at 7yrs of age, s/p pulmonary valve replacement at 13yrs of age), moderate-severe pulmonary insufficiency, HTN, hyperparathyroidism, and palpitations who presents to  clinic for routine follow-up.    Labs from 9/2023 showed normal electrolytes, renal function, and liver function.    Chest MRA/cMRI from 11/2023 showed normal biventricular function (LVEF 63%, RVEF 54%), stable moderate pulmonary insufficiency (regurgitant volume 47mL/regurgitant fraction 39%), and stable dilation of aortic root (4.1cm).    CPX from 11/2023 was negative for inducible ischemia but revealed severely impaired functional capacity, decreased from prior.  Her findings supported a cardiac limitation, as her peak VO2 was low and she had frequent ectopy, including a 6-beat run of VT.    Ms. Gaviria appears ***      Tetralogy of Fallot  s/p pulmonary valve repair (7yrs of age) and s/p pulmonary valve replacement (13yrs of age)  Moderate pulmonary insufficiency  Mildly dilated aortic root  HTN  s/p surgical repair at 11 months of age, s/p pulmonary valve repair at 7yrs of age, s/p pulmonary valve replacement at 13yrs of age.  Chest MRA/cMRI from 11/2023 showed normal biventricular function (LVEF 63%, RVEF 54%), stable moderate pulmonary insufficiency (regurgitant volume 47mL/regurgitant fraction 39%), and stable dilation of aortic root (4.1cm).  - continue SBE ppx with amoxicillin  - continue losartan 50mg daily    Palpitations  Follows with EP, on diltiazem 120mg daily plus 60mg po TID prn.  Recent Zio from 11/2023 remains in progress, will send a message to EP team re: this.    Severe functional capacity  CPX from 11/2023 revealed a severe, class II functional capacity, with an 11% decrease when compared to prior study.  Her EKG was negative for inducible ischemia, but findings were consistent with a cardiac limitation given a low peak VO2 and frequent ectopy, including a 6-beat run of VT.       The above was reviewed with Ms. Gaviria, who verbalized understanding and will call with further questions/concerns.    *** minutes spent with patient, along with preparing for visit, reviewing follow up, and  completing documentation.      Farrah Veloz DNP, FNP-BC  Advanced Heart Failure Nurse Practitioner  [facility]  Patient Care Team:  Leila Tay DO as PCP - General  Deyanira Lynn APRN CNP as Assigned Heart and Vascular Provider  JODY WILKINS        Please do not hesitate to contact me if you have any questions/concerns.     Sincerely,     Jody Wilkins MD

## 2023-12-11 RX ORDER — AMOXICILLIN 500 MG/1
CAPSULE ORAL
Qty: 12 CAPSULE | Refills: 3 | Status: SHIPPED | OUTPATIENT
Start: 2023-12-11 | End: 2024-07-23

## 2023-12-27 ENCOUNTER — LAB (OUTPATIENT)
Dept: LAB | Facility: CLINIC | Age: 33
End: 2023-12-27
Payer: COMMERCIAL

## 2023-12-27 DIAGNOSIS — I10 ESSENTIAL HYPERTENSION: ICD-10-CM

## 2023-12-27 DIAGNOSIS — I37.1 PULMONARY VALVE INSUFFICIENCY, UNSPECIFIED ETIOLOGY: ICD-10-CM

## 2023-12-27 DIAGNOSIS — I51.7 ENLARGED RV (RIGHT VENTRICLE): ICD-10-CM

## 2023-12-27 DIAGNOSIS — Q21.3 TETRALOGY OF FALLOT: ICD-10-CM

## 2023-12-27 LAB
ALBUMIN SERPL BCG-MCNC: 4.4 G/DL (ref 3.5–5.2)
ALP SERPL-CCNC: 44 U/L (ref 40–150)
ALT SERPL W P-5'-P-CCNC: 14 U/L (ref 0–50)
ANION GAP SERPL CALCULATED.3IONS-SCNC: 10 MMOL/L (ref 7–15)
AST SERPL W P-5'-P-CCNC: 24 U/L (ref 0–45)
BASOPHILS # BLD AUTO: 0 10E3/UL (ref 0–0.2)
BASOPHILS NFR BLD AUTO: 1 %
BILIRUB SERPL-MCNC: 0.8 MG/DL
BUN SERPL-MCNC: 7.7 MG/DL (ref 6–20)
CALCIUM SERPL-MCNC: 10.1 MG/DL (ref 8.6–10)
CHLORIDE SERPL-SCNC: 108 MMOL/L (ref 98–107)
CREAT SERPL-MCNC: 0.86 MG/DL (ref 0.51–0.95)
DEPRECATED HCO3 PLAS-SCNC: 23 MMOL/L (ref 22–29)
EGFRCR SERPLBLD CKD-EPI 2021: 90 ML/MIN/1.73M2
EOSINOPHIL # BLD AUTO: 0.3 10E3/UL (ref 0–0.7)
EOSINOPHIL NFR BLD AUTO: 3 %
ERYTHROCYTE [DISTWIDTH] IN BLOOD BY AUTOMATED COUNT: 16.2 % (ref 10–15)
FERRITIN SERPL-MCNC: 4 NG/ML (ref 6–175)
GLUCOSE SERPL-MCNC: 82 MG/DL (ref 70–99)
HCT VFR BLD AUTO: 31.8 % (ref 35–47)
HGB BLD-MCNC: 8.8 G/DL (ref 11.7–15.7)
IMM GRANULOCYTES # BLD: 0 10E3/UL
IMM GRANULOCYTES NFR BLD: 0 %
IRON BINDING CAPACITY (ROCHE): 415 UG/DL (ref 240–430)
IRON SATN MFR SERPL: 3 % (ref 15–46)
IRON SERPL-MCNC: 13 UG/DL (ref 37–145)
LYMPHOCYTES # BLD AUTO: 2.1 10E3/UL (ref 0.8–5.3)
LYMPHOCYTES NFR BLD AUTO: 25 %
MCH RBC QN AUTO: 20.6 PG (ref 26.5–33)
MCHC RBC AUTO-ENTMCNC: 27.7 G/DL (ref 31.5–36.5)
MCV RBC AUTO: 74 FL (ref 78–100)
MONOCYTES # BLD AUTO: 0.7 10E3/UL (ref 0–1.3)
MONOCYTES NFR BLD AUTO: 8 %
NEUTROPHILS # BLD AUTO: 5.4 10E3/UL (ref 1.6–8.3)
NEUTROPHILS NFR BLD AUTO: 63 %
NRBC # BLD AUTO: 0 10E3/UL
NRBC BLD AUTO-RTO: 0 /100
PLATELET # BLD AUTO: 327 10E3/UL (ref 150–450)
POTASSIUM SERPL-SCNC: 3.6 MMOL/L (ref 3.4–5.3)
PROT SERPL-MCNC: 8.2 G/DL (ref 6.4–8.3)
RBC # BLD AUTO: 4.28 10E6/UL (ref 3.8–5.2)
SODIUM SERPL-SCNC: 141 MMOL/L (ref 135–145)
WBC # BLD AUTO: 8.5 10E3/UL (ref 4–11)

## 2023-12-27 PROCEDURE — 82728 ASSAY OF FERRITIN: CPT | Performed by: PATHOLOGY

## 2023-12-27 PROCEDURE — 80053 COMPREHEN METABOLIC PANEL: CPT | Performed by: PATHOLOGY

## 2023-12-27 PROCEDURE — 83550 IRON BINDING TEST: CPT | Performed by: PATHOLOGY

## 2023-12-27 PROCEDURE — 85025 COMPLETE CBC W/AUTO DIFF WBC: CPT | Performed by: PATHOLOGY

## 2023-12-27 PROCEDURE — 83540 ASSAY OF IRON: CPT | Performed by: PATHOLOGY

## 2023-12-27 PROCEDURE — 36415 COLL VENOUS BLD VENIPUNCTURE: CPT | Performed by: PATHOLOGY

## 2023-12-28 NOTE — TELEPHONE ENCOUNTER
Hematology referral reviewed for [facility], see below.    Referral reason: TEAGAN - recent labs support diagnosis    Clinical question entered by referring provider or through order transcription: anemia     Referral received via: Internal referral by [facility]    Current abnormal labs: Available in Chart Review    Outreach: Call not placed to patient regarding referral.    Plan: Triage instructions updated and sent to NPS for completion.

## 2023-12-29 ENCOUNTER — MYC MEDICAL ADVICE (OUTPATIENT)
Dept: CARDIOLOGY | Facility: CLINIC | Age: 33
End: 2023-12-29
Payer: COMMERCIAL

## 2023-12-29 DIAGNOSIS — D50.9 IRON DEFICIENCY ANEMIA, UNSPECIFIED IRON DEFICIENCY ANEMIA TYPE: Primary | ICD-10-CM

## 2024-01-03 NOTE — TELEPHONE ENCOUNTER
RECORDS STATUS - ALL OTHER DIAGNOSIS      RECORDS RECEIVED FROM: [facility], [place]   DATE RECEIVED: 1/3   NOTES STATUS DETAILS   OFFICE NOTE from referring provider Neil Ojeda MD in  [facility]: 12/8/23   OFFICE NOTE from medical oncologist [facility] Dr. Ranjith Bueno: 11/12/21    Dr. Pavan Pinon: 10/8/20   DISCHARGE SUMMARY from hospital  - [facility] 8/17/21, 2/13/17: [facility]    7/13/19, 6/9/19, 1/9/16:    DISCHARGE REPORT from the ER [facility] , [facility] 7/18/23, 10/21/19, 5/1/18, 1/17/18, 9/27/17: [facility]    11/22/21:     7/12/17, 9/27/17, 4/3/16, 10/18/15, 10/17/15, 6/22/15: [facility]   MEDICATION LIST [facility] 12/11/23   LABS     ANYTHING RELATED TO DIAGNOSIS Epic 12/27/23

## 2024-01-04 PROBLEM — D50.9 ANEMIA, IRON DEFICIENCY: Status: ACTIVE | Noted: 2024-01-04

## 2024-01-05 ENCOUNTER — PRE VISIT (OUTPATIENT)
Dept: ONCOLOGY | Facility: CLINIC | Age: 34
End: 2024-01-05
Payer: COMMERCIAL

## 2024-02-08 ENCOUNTER — MYC MEDICAL ADVICE (OUTPATIENT)
Dept: CARDIOLOGY | Facility: CLINIC | Age: 34
End: 2024-02-08
Payer: COMMERCIAL

## 2024-03-19 ENCOUNTER — MYC MEDICAL ADVICE (OUTPATIENT)
Dept: CARDIOLOGY | Facility: CLINIC | Age: 34
End: 2024-03-19
Payer: COMMERCIAL

## 2024-03-19 DIAGNOSIS — Q24.9 ADULT CONGENITAL HEART DISEASE: ICD-10-CM

## 2024-03-19 DIAGNOSIS — Q21.3 TETRALOGY OF FALLOT: Primary | ICD-10-CM

## 2024-03-19 DIAGNOSIS — I51.7 ENLARGED RV (RIGHT VENTRICLE): ICD-10-CM

## 2024-03-19 DIAGNOSIS — D50.9 IRON DEFICIENCY ANEMIA, UNSPECIFIED IRON DEFICIENCY ANEMIA TYPE: ICD-10-CM

## 2024-04-05 ENCOUNTER — ALLIED HEALTH/NURSE VISIT (OUTPATIENT)
Dept: CARDIOLOGY | Facility: CLINIC | Age: 34
End: 2024-04-05
Payer: COMMERCIAL

## 2024-04-05 VITALS — DIASTOLIC BLOOD PRESSURE: 89 MMHG | SYSTOLIC BLOOD PRESSURE: 134 MMHG

## 2024-04-05 DIAGNOSIS — Z01.30 BP CHECK: Primary | ICD-10-CM

## 2024-04-05 NOTE — PROGRESS NOTES
Brittani Gaviria was evaluated at [facility] on April 5, 2024 at which time her blood pressure was:    BP Readings from Last 1 Encounters:   04/05/24 134/89     No data recorded      Reviewed lifestyle modifications for blood pressure control and reduction: including making healthy food choices, managing weight, getting regular exercise, smoking cessation, reducing alcohol consumption, monitoring blood pressure regularly.     Symptoms: None    BP Goal:< 140/90 mmHg    BP Assessment:  BP at goal    Potential Reasons for BP too high: NA - Not applicable    BP Follow-Up Plan: Recheck BP in 6 months at pharmacy    Recommendation to Provider: No recommendations at the moment. BP at goal.    Note completed by: Dee De Los Santos RPH

## 2024-04-10 NOTE — TELEPHONE ENCOUNTER
Date: 4/10/2024    Time of Call: 3:28 PM     Diagnosis:  TOF     [ TORB ] Ordering provider: .Neil Wilkins MD  Order: CBC, iron panel, echo     Order received by: Sadie Hurd RN      Follow-up/additional notes: updated patient

## 2024-04-11 ENCOUNTER — TELEPHONE (OUTPATIENT)
Dept: PEDIATRIC CARDIOLOGY | Facility: CLINIC | Age: 34
End: 2024-04-11
Payer: COMMERCIAL

## 2024-04-11 DIAGNOSIS — Q21.3 TETRALOGY OF FALLOT: Primary | ICD-10-CM

## 2024-04-11 DIAGNOSIS — I51.7 ENLARGED RV (RIGHT VENTRICLE): ICD-10-CM

## 2024-04-11 DIAGNOSIS — I37.1 PULMONARY VALVE INSUFFICIENCY: ICD-10-CM

## 2024-05-16 ENCOUNTER — MYC MEDICAL ADVICE (OUTPATIENT)
Dept: CARDIOLOGY | Facility: CLINIC | Age: 34
End: 2024-05-16
Payer: COMMERCIAL

## 2024-05-23 ENCOUNTER — HOSPITAL ENCOUNTER (OUTPATIENT)
Dept: CARDIOLOGY | Facility: CLINIC | Age: 34
Discharge: HOME | End: 2024-05-23
Payer: COMMERCIAL

## 2024-05-23 ENCOUNTER — LAB (OUTPATIENT)
Dept: LAB | Facility: CLINIC | Age: 34
End: 2024-05-23
Payer: COMMERCIAL

## 2024-05-23 DIAGNOSIS — I51.7 ENLARGED RV (RIGHT VENTRICLE): ICD-10-CM

## 2024-05-23 DIAGNOSIS — D50.9 IRON DEFICIENCY ANEMIA, UNSPECIFIED IRON DEFICIENCY ANEMIA TYPE: ICD-10-CM

## 2024-05-23 DIAGNOSIS — Q21.3 TETRALOGY OF FALLOT: ICD-10-CM

## 2024-05-23 DIAGNOSIS — I37.1 PULMONARY VALVE INSUFFICIENCY: ICD-10-CM

## 2024-05-23 DIAGNOSIS — Q24.9 ADULT CONGENITAL HEART DISEASE: ICD-10-CM

## 2024-05-23 LAB
BASOPHILS # BLD AUTO: 0 10E3/UL (ref 0–0.2)
BASOPHILS NFR BLD AUTO: 0 %
EOSINOPHIL # BLD AUTO: 0.2 10E3/UL (ref 0–0.7)
EOSINOPHIL NFR BLD AUTO: 2 %
ERYTHROCYTE [DISTWIDTH] IN BLOOD BY AUTOMATED COUNT: 14.4 % (ref 10–15)
FERRITIN SERPL-MCNC: 164 NG/ML (ref 6–175)
HCT VFR BLD AUTO: 41.7 % (ref 35–47)
HGB BLD-MCNC: 12.9 G/DL (ref 11.7–15.7)
IMM GRANULOCYTES # BLD: 0 10E3/UL
IMM GRANULOCYTES NFR BLD: 0 %
IRON BINDING CAPACITY (ROCHE): 336 UG/DL (ref 240–430)
IRON SATN MFR SERPL: 21 % (ref 15–46)
IRON SERPL-MCNC: 71 UG/DL (ref 37–145)
LYMPHOCYTES # BLD AUTO: 1.7 10E3/UL (ref 0.8–5.3)
LYMPHOCYTES NFR BLD AUTO: 22 %
MCH RBC QN AUTO: 27.6 PG (ref 26.5–33)
MCHC RBC AUTO-ENTMCNC: 30.9 G/DL (ref 31.5–36.5)
MCV RBC AUTO: 89 FL (ref 78–100)
MONOCYTES # BLD AUTO: 0.5 10E3/UL (ref 0–1.3)
MONOCYTES NFR BLD AUTO: 6 %
NEUTROPHILS # BLD AUTO: 5.4 10E3/UL (ref 1.6–8.3)
NEUTROPHILS NFR BLD AUTO: 70 %
NRBC # BLD AUTO: 0 10E3/UL
NRBC BLD AUTO-RTO: 0 /100
PLATELET # BLD AUTO: 289 10E3/UL (ref 150–450)
RBC # BLD AUTO: 4.67 10E6/UL (ref 3.8–5.2)
WBC # BLD AUTO: 7.8 10E3/UL (ref 4–11)

## 2024-05-23 PROCEDURE — 36415 COLL VENOUS BLD VENIPUNCTURE: CPT

## 2024-05-23 PROCEDURE — 83550 IRON BINDING TEST: CPT

## 2024-05-23 PROCEDURE — 93325 DOPPLER ECHO COLOR FLOW MAPG: CPT | Mod: 26 | Performed by: PEDIATRICS

## 2024-05-23 PROCEDURE — 85025 COMPLETE CBC W/AUTO DIFF WBC: CPT

## 2024-05-23 PROCEDURE — 93320 DOPPLER ECHO COMPLETE: CPT | Mod: 26 | Performed by: PEDIATRICS

## 2024-05-23 PROCEDURE — 82728 ASSAY OF FERRITIN: CPT

## 2024-05-23 PROCEDURE — 93303 ECHO TRANSTHORACIC: CPT | Mod: 26 | Performed by: PEDIATRICS

## 2024-05-23 PROCEDURE — 93325 DOPPLER ECHO COLOR FLOW MAPG: CPT

## 2024-05-28 DIAGNOSIS — Q21.3 TETRALOGY OF FALLOT: Primary | ICD-10-CM

## 2024-05-28 DIAGNOSIS — I51.7 ENLARGED RV (RIGHT VENTRICLE): ICD-10-CM

## 2024-05-28 DIAGNOSIS — I37.1 PULMONARY VALVE INSUFFICIENCY, UNSPECIFIED ETIOLOGY: ICD-10-CM

## 2024-05-28 NOTE — PROGRESS NOTES
Patient Name: Brittani Gaviria  YOB: 1990  MRN: [id number]    Date of Request: May 28, 2024    Requesting cardiologist: Dr. Neil Wilkins    Diagnosis:   TOF  S/P TAP at 11 months of age  S/P 25 mm RV-PA homograft  ([facility] in [place], [place] , Dr Yue Johnson and Dr. Johnson) at 10 yrs of age (7/31/2001) (surgical report scanned on 4/26/24)  Hyperparathyroidism sp resection  S/P lap cholecystectomy 5/16/2014    INDICATIONS: Increase in symptoms, abnormal CXR with 11% decrease in functional capacity, CMR RVEDV 112.8 and RVESV 51.7 ml/m2, RVEF 54%, PI 39%. No significant stenosis PG 12 mmHg by echo    Procedure: [facility], coronary and RVOT angiography, TPVR    Cath to be scheduled with: ROSENDO and [facility] or [facility] assisting    Length of procedure: 4 hrs    Echo: None If Yes, reason:      Surgical Backup:In house    Admission Type:Other 10A    Other imaging/procedures needed at time of cath: Yes  If Yes: Chest CTA (valve protocol)    Meds to be stopped/replacement meds/restart meds: ASA 1 week prior to TPVR    Date/time options to offer family:   Routine    Request pre procedure clinic visit with cathing physician:  Yes. Please schedule clinic visit after obtaining CTA    Other orders/comments:

## 2024-06-07 ENCOUNTER — MYC MEDICAL ADVICE (OUTPATIENT)
Dept: CARDIOLOGY | Facility: CLINIC | Age: 34
End: 2024-06-07
Payer: COMMERCIAL

## 2024-06-10 ENCOUNTER — TELEPHONE (OUTPATIENT)
Dept: CARDIOLOGY | Facility: CLINIC | Age: 34
End: 2024-06-10
Payer: COMMERCIAL

## 2024-06-11 ENCOUNTER — OFFICE VISIT (OUTPATIENT)
Dept: CARDIOLOGY | Facility: CLINIC | Age: 34
End: 2024-06-11
Attending: PHYSICAL MEDICINE & REHABILITATION
Payer: COMMERCIAL

## 2024-06-11 VITALS
OXYGEN SATURATION: 99 % | WEIGHT: 194 LBS | SYSTOLIC BLOOD PRESSURE: 139 MMHG | DIASTOLIC BLOOD PRESSURE: 92 MMHG | HEART RATE: 76 BPM | BODY MASS INDEX: 31.33 KG/M2

## 2024-06-11 DIAGNOSIS — Q24.9 ADULT CONGENITAL HEART DISEASE: ICD-10-CM

## 2024-06-11 DIAGNOSIS — Q21.3 TOF (TETRALOGY OF FALLOT): Primary | ICD-10-CM

## 2024-06-11 DIAGNOSIS — I47.10 NONSUSTAINED SUPRAVENTRICULAR TACHYCARDIA (H): ICD-10-CM

## 2024-06-11 DIAGNOSIS — I51.7 ENLARGED RV (RIGHT VENTRICLE): ICD-10-CM

## 2024-06-11 PROCEDURE — 99215 OFFICE O/P EST HI 40 MIN: CPT | Performed by: INTERNAL MEDICINE

## 2024-06-11 PROCEDURE — G0463 HOSPITAL OUTPT CLINIC VISIT: HCPCS | Performed by: INTERNAL MEDICINE

## 2024-06-11 PROCEDURE — 93005 ELECTROCARDIOGRAM TRACING: CPT

## 2024-06-11 PROCEDURE — 93010 ELECTROCARDIOGRAM REPORT: CPT | Performed by: INTERNAL MEDICINE

## 2024-06-11 ASSESSMENT — PAIN SCALES - GENERAL: PAINLEVEL: MODERATE PAIN (4)

## 2024-06-11 NOTE — PROGRESS NOTES
[facility]    Assessment/Recommendations   Assessment/Plan:    Ms. Gaviria is a 33 year old female who has a past medical history significant for TOF s/p surgical repair at 11 months, s/p pulmonary valve repair at 7 years, s/p pulmonary valve replacement at age 13 years, and palpitations.       [facility] team is planning transcatheter PVR. As part of this evaluation will plan for pre-procedure EPS.   Given the patient has had worsening PV insufficiency with prior interventions many years ago, will need to evaluate her for ventricular arrhythmias arising from the side of the surgical repair which is also the area that is going to be covered by the stent of the prosthetic pulmonic vein.  New data is showing that an EP study before such procedure might be beneficial and decreasing the chances of ventricular arrhythmias and/or intervening on a possible circuit before it is covered by the pulmonic valve stent, and would be reasonable for risk stratification since up to 19% of these patients would have the ventricular arrhythmia.  We discussed the risks of the procedure with the patient and answered all questions. The follow-up will depend on results.      Follow-up in one year with [facility] or sooner if positive EPS.            History of Present Illness/Subjective    Ms. Brittani Gaviria is a 33 year old female who comes in today for EP consultation of palpitations.    Ms. Gaviria is a 33 year old female who has a past medical history significant for TOF s/p surgical repair at 11 months, s/p pulmonary valve repair at 7 years, s/p pulmonary valve replacement at age 13 years, and palpitations.     She was born with TOF and underwent repair at 11 months. She required pulmonary valve repair at age 7 and the a pulmonary valve replacement at 13 years of age. Her last CMR at OSH showed normal biventricular function, severe RV dilation, moderate LV dilation, and moderate pulmonary valve regurgitation. She  began developing palpitations. She had an episode in 11/2021 with palpitations and dizziness and had to sit down. She was evaluated at OSH and ECG and echo were stable and no intervention was done. Her symptoms continued and she was seen again. A zio patch from OSH in 1/2022 reported to show 1 NSVT 6 beats, 45 PSVT up to 7.2 seconds, frequent PACs 14.4%, and rare isolated PVCs. 56 symptom activations reportedly corresponded to PAC/PVCs. She was started on Metoprolol. She had some weird hallucinations and metoprolol was lowered and this subsided. She started having some chest pains and metoprolol was decreased further but symptoms continued. Metoprolol was stopped in 4/5/22 due to this.     EP Visit 5/6/22: She reports feeling OK. She has noted her COOPER has progressed over last year. She continues to have palpitations many times a day for a minute or less. She denies chest discomfort, palpitations, abdominal fullness/bloating or peripheral edema, shortness of breath, paroxysmal nocturnal dyspnea, orthopnea, lightheadedness, dizziness, pre-syncope, or syncope. Presenting 12 lead ECG shows SR with PACs IVCD Vent Rate 73 bpm,  ms,  ms, QTc 464 ms. No current cardiac medications. She was started on Diltiazem at this visit.     EP Visit 6/3/22: She presents today for follow up. She reports feeling improved symptoms control with diltiazem. She still has some residual palpitations but much improved. She denies chest discomfort, abdominal fullness/bloating or peripheral edema, shortness of breath, paroxysmal nocturnal dyspnea, orthopnea, lightheadedness, dizziness, pre-syncope, or syncope. Current cardiac medication: Diltiazem.     EP Visit 3/24/23: She presents today for follow up. She reports feeling improved since we last saw her. She feels symptoms >80% improved. She still has some episodes about 4-5 times a month of short palpitations with chest pressure and lightheadedness. She states she cut out caffeine,  "lost weight, increased exercise and with the diltiazem feels her symptoms have improved. She is getting her IUD out to see if this will help improve some of her remaining symptoms. She denies chest discomfort, palpitations, abdominal fullness/bloating or peripheral edema, shortness of breath, paroxysmal nocturnal dyspnea, orthopnea, lightheadedness, dizziness, pre-syncope, or syncope. Presenting 12 lead ECG shows NSR Vent Rate 67 bpm,  ms,  ms, QTc 452 ms. Current cardiac medication: Diltiazem and Lisinopril.     EP Visit 12/1/23: She presents today for follow up. Zio patch monitor still in progress. She reports feeling Ok. She continues to have some \"anxiousness\" in her chest and some palpitations. She denies chest discomfort, abdominal fullness/bloating or peripheral edema, shortness of breath, paroxysmal nocturnal dyspnea, orthopnea, lightheadedness, dizziness, pre-syncope, or syncope. Presenting 12 lead ECG shows SR with PVCs Vent Rate 80 bpm,  ms,  ms, QTc 477 ms. Current cardiac medication: Diltiazem and Lisinopril.     She presents today for follow up. She reports feeling at baseline. She continues to have some palpitations. She is going to have percutaneous TPVR.  She denies chest discomfort, abdominal fullness/bloating or peripheral edema, shortness of breath, paroxysmal nocturnal dyspnea, orthopnea, lightheadedness, dizziness, pre-syncope, or syncope. An echo from 5/2024 showed moderate/severe PI, no residual ventricular level shunting, mild/moderate RV enlargement, normal biventricular function, and dilation of aortic root at sinuses of Valsalva. Presenting 12 lead ECG shows SR IVCD Vent Rate 68 bpm,  ms,  ms, QTc 455 ms. Current cardiac medications include: Diltiazem and Lisinopril. .       I have reviewed and updated the patient's Past Medical History, Social History, Family History and Medication List.     Cardiographics (Personally Reviewed) :   5/23/24 " Echo:  ##### CONCLUSIONS #####  Tetralogy of Fallot s/p surgical repair with transannular patch at 11 months  of age, s/p pulmonary valve repair at 7yrs of age, s/p pulmonary valve  replacement at 13yrs of age     Moderate to severe pulmonary valve insufficiency. No pulmonary valve stenosis,  peak gradient 12 mmHg. There is no residual ventricular level shunt. Trivial  tricuspid and mitral valve insufficiency. There is mild to moderate right  ventricular enlargement with normal right ventricular systolic function.  Normal left ventricular size and systolic function. The calculated single  plane left ventricular ejection fraction from the 4 chamber view is 68%. There  is dilation of the aortic root at the level of the sinuses of Valsalva. No  aortic valve insufficiency.    11/2021 Echo (OSH Report):  CONCLUSIONS   Patient is status post repair of tetralogy of Fallot.   1. Left ventricular ejection fraction is visually estimated at 65%.   Abnormal septal motion consistent with prior open heart surgery.   2. Moderate right ventricular dilation is present. Global right   ventricular systolic function is moderately reduced.   3. Mixed pulmonary valve disease. Pulmonic stenosis. Peak velocity   through the pulmonary valve is 2.5 m/sec and a peak gradient 27 mm of   Hg..   Probably severe pulmonic regurgitation.   4. Proximal ascending aorta measures at 3.9 cm.   Compared to the prior study dated June 11, 2019 , no significant   change was noted.     2/10/17 Echo:      CONCLUSIONS  Patient after transannular patch repair for tetralogy of Fallot. There is no  residual ventricular level shunt. There is mild to moderate right ventricular  enlargement. Normal right ventricular systolic function. Normal left  ventricular systolic function with a calculated biplane left ventricular  ejection fraction of 60-65%. Trivial tricuspid valve insufficiency. Estimated  right ventricular systolic pressure is 25-30 mmHg plus right atrial  pressure.  There is mild flow acceleration across the pulmonary valve with a peak  gradient across the pulmonary valve of 15-20 mmHg. Moderate to severe (3-4+)  pulmonary valve insufficiency. There is mild dilation of the aortic root at  the level of the sinuses of Valsalva.    10/30/20 CMR (OSH Report):  Impression:   1. Normal left and right ventricular function, EF 55.6 and 46.9% respectively   2. Moderate left ventricular enlargement with severe right ventricular enlargement.   3. Overriding aortic root with mildly dilated sinus of Valsalva at 41.9 mm.   4. Moderate pulmonary regurgitation with a regurgitant volume of 44.2 mL, regurgitation fraction of 32.3%.   5. No residual VSD          Physical Examination   BP (!) 139/92 (BP Location: Left arm, Patient Position: Chair, Cuff Size: Adult Regular)   Pulse 76   Wt 88 kg (194 lb)   SpO2 99%   BMI 31.33 kg/m    Wt Readings from Last 3 Encounters:   12/08/23 88.3 kg (194 lb 9.6 oz)   12/01/23 88.8 kg (195 lb 11.2 oz)   11/16/23 86.9 kg (191 lb 9.3 oz)     General Appearance:   Alert, well-appearing and in no acute distress.   HEENT: Atraumatic, normocephalic. PERRL.  MMM.   Chest/Lungs:   Respirations unlabored.  Lungs are clear to auscultation.   Cardiovascular:   Regular. Murmur present.    Abdomen:  Soft, nontender, nondistended.   Extremities: No cyanosis or clubbing. No edema.    Musculoskeletal: Moves all extremities.  Gait normal.   Skin: Warm, dry, intact.    Neurologic: Mood and affect are appropriate.  Alert and oriented to person, place, time, and situation.          Medications  Allergies   Current Outpatient Medications   Medication Sig Dispense Refill    acetaminophen (TYLENOL) 325 MG tablet Take 325-650 mg by mouth      amoxicillin (AMOXIL) 500 MG capsule TAKE FOUR CAPSULES BY MOUTH 1 HOUR BEFORE DENTAL PROCEDURE 12 capsule 3    benzoyl peroxide 5 % external liquid       Blood Pressure Monitoring (BLOOD PRESSURE CUFF) MISC Please check blood  pressures as needed. 1 each 0    cholecalciferol (VITAMIN D3) 125 mcg (5000 units) capsule Take 1 capsule by mouth daily      clindamycin (CLEOCIN T) 1 % external solution       cyclobenzaprine (FLEXERIL) 5 MG tablet Take 5-10 mg by mouth      diltiazem (CARDIZEM) 60 MG tablet Take 1 tablet (60 mg) by mouth 3 times daily as needed (palpitations) 90 tablet 1    diltiazem ER COATED BEADS (CARDIZEM CD/CARTIA XT) 120 MG 24 hr capsule TAKE ONE CAPSULE BY MOUTH ONCE DAILY. 90 capsule 3    fluticasone (FLONASE) 50 MCG/ACT nasal spray 2 sprays      losartan (COZAAR) 50 MG tablet Take 1 tablet (50 mg) by mouth daily 90 tablet 3    naratriptan (AMERGE) 2.5 MG tablet 1 tab at the onset of migraine. May repeat in 4 hrs. Max 2 tabs/24 hrs, 2-3 day/week, 9 days/month.      ondansetron (ZOFRAN ODT) 4 MG ODT tab 1 to 2 tabs by mouth up to twice a day as needed for nausea symptoms related to migraine. Max 9 days per month.      RETIN-A 0.025 % external cream       spironolactone (ALDACTONE) 25 MG tablet Take 1 tablet (25 mg) by mouth daily 90 tablet 3    VENTOLIN  (90 Base) MCG/ACT inhaler  (Patient not taking: Reported on 12/1/2023)      vitamin D2 (ERGOCALCIFEROL) 53709 units (1250 mcg) capsule  (Patient not taking: Reported on 12/1/2023)      Allergies   Allergen Reactions    Blood Transfusion Related (Informational Only) Other (See Comments)     Patient has a history of a clinically significant antibody against RBC antigens.  A delay in compatible RBCs may occur.    Ketorolac Hives, Itching and Unknown     Tolerates ibuprofen without reaction      Ferumoxytol Difficulty breathing, Cramps, Hives, Itching and Muscle Pain (Myalgia)         Lab Results (Personally Reviewed)    Chemistry/lipid CBC Cardiac Enzymes/BNP/TSH/INR   Lab Results   Component Value Date    BUN 7.7 12/27/2023     12/27/2023    CO2 23 12/27/2023     Creatinine   Date Value Ref Range Status   12/27/2023 0.86 0.51 - 0.95 mg/dL Final   07/20/2016 0.74  0.52 - 1.04 mg/dL Final       Lab Results   Component Value Date    CHOL 171 05/06/2022    HDL 48 (L) 05/06/2022     (H) 05/06/2022      Lab Results   Component Value Date    WBC 7.8 05/23/2024    HGB 12.9 05/23/2024    HCT 41.7 05/23/2024    MCV 89 05/23/2024     05/23/2024    Lab Results   Component Value Date    TSH 1.27 05/06/2022        The patient states understanding and is agreeable with the plan.   Casper Valdes MD [facility]  Cardiology - Electrophysiology    Total time spent on patient visit, reviewing notes, imaging, labs, orders, and completing necessary documentation: 45 minutes.  >50% of visit spent on counseling patient and/or coordination of care.

## 2024-06-11 NOTE — NURSING NOTE
Chief Complaint   Patient presents with    Follow Up     33 yr old female with PMH significant for TOF and mechanical valve presenting for evaluation.       Vitals were taken, medications reconciled and EKG performed.    Paige Thurner, Facilitator  8:23 AM

## 2024-06-11 NOTE — LETTER
6/11/2024      RE: Brittani Gaviria  [address]  [place] [address]       Dear Colleague,    Thank you for the opportunity to participate in the care of your patient, Brittani Gaviria, at the [facility] [place] [facility]. Please see a copy of my visit note below.        [facility] ELECTROPHYSIOLOGY CLINIC VISIT    Assessment/Recommendations   Assessment/Plan:    Ms. Gaviria is a 33 year old female who has a past medical history significant for TOF s/p surgical repair at 11 months, s/p pulmonary valve repair at 7 years, s/p pulmonary valve replacement at age 13 years, and palpitations.       [facility]HD team is planning transcatheter PVR. As part of this evaluation will plan for pre-procedure EPS.   Given the patient has had worsening PV insufficiency with prior interventions many years ago, will need to evaluate her for ventricular arrhythmias arising from the side of the surgical repair which is also the area that is going to be covered by the stent of the prosthetic pulmonic vein.  New data is showing that an EP study before such procedure might be beneficial and decreasing the chances of ventricular arrhythmias and/or intervening on a possible circuit before it is covered by the pulmonic valve stent, and would be reasonable for risk stratification since up to 19% of these patients would have the ventricular arrhythmia.  We discussed the risks of the procedure with the patient and answered all questions. The follow-up will depend on results.      Follow-up in one year with ZIo or sooner if positive EPS.            History of Present Illness/Subjective    Ms. Brittani Gaviria is a 33 year old female who comes in today for EP consultation of palpitations.    Ms. Gaviria is a 33 year old female who has a past medical history significant for TOF s/p surgical repair at 11 months, s/p pulmonary valve repair at 7 years, s/p pulmonary valve  replacement at age 13 years, and palpitations.     She was born with TOF and underwent repair at 11 months. She required pulmonary valve repair at age 7 and the a pulmonary valve replacement at 13 years of age. Her last CMR at OSH showed normal biventricular function, severe RV dilation, moderate LV dilation, and moderate pulmonary valve regurgitation. She began developing palpitations. She had an episode in 11/2021 with palpitations and dizziness and had to sit down. She was evaluated at OSH and ECG and echo were stable and no intervention was done. Her symptoms continued and she was seen again. A zio patch from OSH in 1/2022 reported to show 1 NSVT 6 beats, 45 PSVT up to 7.2 seconds, frequent PACs 14.4%, and rare isolated PVCs. 56 symptom activations reportedly corresponded to PAC/PVCs. She was started on Metoprolol. She had some weird hallucinations and metoprolol was lowered and this subsided. She started having some chest pains and metoprolol was decreased further but symptoms continued. Metoprolol was stopped in 4/5/22 due to this.     EP Visit 5/6/22: She reports feeling OK. She has noted her COOPER has progressed over last year. She continues to have palpitations many times a day for a minute or less. She denies chest discomfort, palpitations, abdominal fullness/bloating or peripheral edema, shortness of breath, paroxysmal nocturnal dyspnea, orthopnea, lightheadedness, dizziness, pre-syncope, or syncope. Presenting 12 lead ECG shows SR with PACs IVCD Vent Rate 73 bpm,  ms,  ms, QTc 464 ms. No current cardiac medications. She was started on Diltiazem at this visit.     EP Visit 6/3/22: She presents today for follow up. She reports feeling improved symptoms control with diltiazem. She still has some residual palpitations but much improved. She denies chest discomfort, abdominal fullness/bloating or peripheral edema, shortness of breath, paroxysmal nocturnal dyspnea, orthopnea, lightheadedness,  "dizziness, pre-syncope, or syncope. Current cardiac medication: Diltiazem.     EP Visit 3/24/23: She presents today for follow up. She reports feeling improved since we last saw her. She feels symptoms >80% improved. She still has some episodes about 4-5 times a month of short palpitations with chest pressure and lightheadedness. She states she cut out caffeine, lost weight, increased exercise and with the diltiazem feels her symptoms have improved. She is getting her IUD out to see if this will help improve some of her remaining symptoms. She denies chest discomfort, palpitations, abdominal fullness/bloating or peripheral edema, shortness of breath, paroxysmal nocturnal dyspnea, orthopnea, lightheadedness, dizziness, pre-syncope, or syncope. Presenting 12 lead ECG shows NSR Vent Rate 67 bpm,  ms,  ms, QTc 452 ms. Current cardiac medication: Diltiazem and Lisinopril.     EP Visit 12/1/23: She presents today for follow up. Zio patch monitor still in progress. She reports feeling Ok. She continues to have some \"anxiousness\" in her chest and some palpitations. She denies chest discomfort, abdominal fullness/bloating or peripheral edema, shortness of breath, paroxysmal nocturnal dyspnea, orthopnea, lightheadedness, dizziness, pre-syncope, or syncope. Presenting 12 lead ECG shows SR with PVCs Vent Rate 80 bpm,  ms,  ms, QTc 477 ms. Current cardiac medication: Diltiazem and Lisinopril.     She presents today for follow up. She reports feeling at baseline. She continues to have some palpitations. She is going to have percutaneous TPVR.  She denies chest discomfort, abdominal fullness/bloating or peripheral edema, shortness of breath, paroxysmal nocturnal dyspnea, orthopnea, lightheadedness, dizziness, pre-syncope, or syncope. An echo from 5/2024 showed moderate/severe PI, no residual ventricular level shunting, mild/moderate RV enlargement, normal biventricular function, and dilation of aortic " root at sinuses of Valsalva. Presenting 12 lead ECG shows SR IVCD Vent Rate 68 bpm,  ms,  ms, QTc 455 ms. Current cardiac medications include: Diltiazem and Lisinopril. .       I have reviewed and updated the patient's Past Medical History, Social History, Family History and Medication List.     Cardiographics (Personally Reviewed) :   5/23/24 Echo:  ##### CONCLUSIONS #####  Tetralogy of Fallot s/p surgical repair with transannular patch at 11 months  of age, s/p pulmonary valve repair at 7yrs of age, s/p pulmonary valve  replacement at 13yrs of age     Moderate to severe pulmonary valve insufficiency. No pulmonary valve stenosis,  peak gradient 12 mmHg. There is no residual ventricular level shunt. Trivial  tricuspid and mitral valve insufficiency. There is mild to moderate right  ventricular enlargement with normal right ventricular systolic function.  Normal left ventricular size and systolic function. The calculated single  plane left ventricular ejection fraction from the 4 chamber view is 68%. There  is dilation of the aortic root at the level of the sinuses of Valsalva. No  aortic valve insufficiency.    11/2021 Echo (OS Report):  CONCLUSIONS   Patient is status post repair of tetralogy of Fallot.   1. Left ventricular ejection fraction is visually estimated at 65%.   Abnormal septal motion consistent with prior open heart surgery.   2. Moderate right ventricular dilation is present. Global right   ventricular systolic function is moderately reduced.   3. Mixed pulmonary valve disease. Pulmonic stenosis. Peak velocity   through the pulmonary valve is 2.5 m/sec and a peak gradient 27 mm of   Hg..   Probably severe pulmonic regurgitation.   4. Proximal ascending aorta measures at 3.9 cm.   Compared to the prior study dated June 11, 2019 , no significant   change was noted.     2/10/17 Echo:      CONCLUSIONS  Patient after transannular patch repair for tetralogy of Fallot. There is no  residual  ventricular level shunt. There is mild to moderate right ventricular  enlargement. Normal right ventricular systolic function. Normal left  ventricular systolic function with a calculated biplane left ventricular  ejection fraction of 60-65%. Trivial tricuspid valve insufficiency. Estimated  right ventricular systolic pressure is 25-30 mmHg plus right atrial pressure.  There is mild flow acceleration across the pulmonary valve with a peak  gradient across the pulmonary valve of 15-20 mmHg. Moderate to severe (3-4+)  pulmonary valve insufficiency. There is mild dilation of the aortic root at  the level of the sinuses of Valsalva.    10/30/20 CMR (OSH Report):  Impression:   1. Normal left and right ventricular function, EF 55.6 and 46.9% respectively   2. Moderate left ventricular enlargement with severe right ventricular enlargement.   3. Overriding aortic root with mildly dilated sinus of Valsalva at 41.9 mm.   4. Moderate pulmonary regurgitation with a regurgitant volume of 44.2 mL, regurgitation fraction of 32.3%.   5. No residual VSD          Physical Examination   BP (!) 139/92 (BP Location: Left arm, Patient Position: Chair, Cuff Size: Adult Regular)   Pulse 76   Wt 88 kg (194 lb)   SpO2 99%   BMI 31.33 kg/m    Wt Readings from Last 3 Encounters:   12/08/23 88.3 kg (194 lb 9.6 oz)   12/01/23 88.8 kg (195 lb 11.2 oz)   11/16/23 86.9 kg (191 lb 9.3 oz)     General Appearance:   Alert, well-appearing and in no acute distress.   HEENT: Atraumatic, normocephalic. PERRL.  MMM.   Chest/Lungs:   Respirations unlabored.  Lungs are clear to auscultation.   Cardiovascular:   Regular. Murmur present.    Abdomen:  Soft, nontender, nondistended.   Extremities: No cyanosis or clubbing. No edema.    Musculoskeletal: Moves all extremities.  Gait normal.   Skin: Warm, dry, intact.    Neurologic: Mood and affect are appropriate.  Alert and oriented to person, place, time, and situation.          Medications  Allergies    Current Outpatient Medications   Medication Sig Dispense Refill    acetaminophen (TYLENOL) 325 MG tablet Take 325-650 mg by mouth      amoxicillin (AMOXIL) 500 MG capsule TAKE FOUR CAPSULES BY MOUTH 1 HOUR BEFORE DENTAL PROCEDURE 12 capsule 3    benzoyl peroxide 5 % external liquid       Blood Pressure Monitoring (BLOOD PRESSURE CUFF) MISC Please check blood pressures as needed. 1 each 0    cholecalciferol (VITAMIN D3) 125 mcg (5000 units) capsule Take 1 capsule by mouth daily      clindamycin (CLEOCIN T) 1 % external solution       cyclobenzaprine (FLEXERIL) 5 MG tablet Take 5-10 mg by mouth      diltiazem (CARDIZEM) 60 MG tablet Take 1 tablet (60 mg) by mouth 3 times daily as needed (palpitations) 90 tablet 1    diltiazem ER COATED BEADS (CARDIZEM CD/CARTIA XT) 120 MG 24 hr capsule TAKE ONE CAPSULE BY MOUTH ONCE DAILY. 90 capsule 3    fluticasone (FLONASE) 50 MCG/ACT nasal spray 2 sprays      losartan (COZAAR) 50 MG tablet Take 1 tablet (50 mg) by mouth daily 90 tablet 3    naratriptan (AMERGE) 2.5 MG tablet 1 tab at the onset of migraine. May repeat in 4 hrs. Max 2 tabs/24 hrs, 2-3 day/week, 9 days/month.      ondansetron (ZOFRAN ODT) 4 MG ODT tab 1 to 2 tabs by mouth up to twice a day as needed for nausea symptoms related to migraine. Max 9 days per month.      RETIN-A 0.025 % external cream       spironolactone (ALDACTONE) 25 MG tablet Take 1 tablet (25 mg) by mouth daily 90 tablet 3    VENTOLIN  (90 Base) MCG/ACT inhaler  (Patient not taking: Reported on 12/1/2023)      vitamin D2 (ERGOCALCIFEROL) 21149 units (1250 mcg) capsule  (Patient not taking: Reported on 12/1/2023)      Allergies   Allergen Reactions    Blood Transfusion Related (Informational Only) Other (See Comments)     Patient has a history of a clinically significant antibody against RBC antigens.  A delay in compatible RBCs may occur.    Ketorolac Hives, Itching and Unknown     Tolerates ibuprofen without reaction      Ferumoxytol  Difficulty breathing, Cramps, Hives, Itching and Muscle Pain (Myalgia)         Lab Results (Personally Reviewed)    Chemistry/lipid CBC Cardiac Enzymes/BNP/TSH/INR   Lab Results   Component Value Date    BUN 7.7 12/27/2023     12/27/2023    CO2 23 12/27/2023     Creatinine   Date Value Ref Range Status   12/27/2023 0.86 0.51 - 0.95 mg/dL Final   07/20/2016 0.74 0.52 - 1.04 mg/dL Final       Lab Results   Component Value Date    CHOL 171 05/06/2022    HDL 48 (L) 05/06/2022     (H) 05/06/2022      Lab Results   Component Value Date    WBC 7.8 05/23/2024    HGB 12.9 05/23/2024    HCT 41.7 05/23/2024    MCV 89 05/23/2024     05/23/2024    Lab Results   Component Value Date    TSH 1.27 05/06/2022        The patient states understanding and is agreeable with the plan.   Casper Valdes MD [facility]  Cardiology - Electrophysiology    Total time spent on patient visit, reviewing notes, imaging, labs, orders, and completing necessary documentation: 45 minutes.  >50% of visit spent on counseling patient and/or coordination of care.               Please do not hesitate to contact me if you have any questions/concerns.     Sincerely,     Casper Valdes MD

## 2024-06-11 NOTE — PATIENT INSTRUCTIONS
Thank you for visiting the Adult Congenital and Cardiovascular Genetics Clinic at the [facility].    Cardiology Providers you saw during your visit:  Casper Valdes MD    Diagnosis:  TOF    Results:  Casper Valdes MD reviewed the results of your EKG testing today in clinic.    ______________________________________________________________________________    Recommendations from your Cardiology Provider:    Complete an EP study      General Cardiac Recommendations:  Continue to eat a heart healthy, low salt diet.  Continue to get 20-30 minutes of aerobic activity, 4-5 days per week.  Examples of aerobic activity include walking, running, swimming, cycling, etc.  Continue to observe good oral hygiene, with regular dental visits.    ____________________________________________________________________________________________________    SBE prophylaxis:   Yes_X___  No____    SBE prophylaxis applies to patients with certain heart conditions who are recommended to take antibiotics before dental appointments and other specific procedures. These antibiotics are to help prevent an infection of the heart (endocarditis) that certain patients are at higher risk of developing. The guidelines used come from the American Heart Association and are periodically updated.    If YES is checked, follow the recommendations outlined below:  Take antibiotic(s) prior to recommended dental procedures and procedures involving the respiratory tract or procedures involving infections of the skin, muscle or bones.   SBE prophylaxis is not needed for routine gastrointestinal and genitourinary procedures (ie. Colonoscopy or vaginal delivery)  Observe good oral hygiene daily, as advised by your dentist. Get regular professional dental care.  Keep cuts and other open injuries clean.  All infections should be treated as soon as possible.  Symptoms of Infective Endocarditis could include: fever lasting more than 4-5 days or a recurrent  fever that initially resolves but returns within 1-2 days). Call us a [phone] if you are experiencing these symptoms.  ____________________________________________________________________________________________________    FASTING CHOLESTEROL was checked in the last 5 years YES__X__  NO____ (2023)  If no, please follow up with your primary care physician. You should have a cholesterol screening every 5 years at minimum, and every year if taking a medication for your cholesterol levels.     ____________________________________________________________________________________________________    Follow-up Plan:  Follow up with Dr Valdes in 1 year after EP study    ____________________________________________________________________________________________________    If you have questions or concerns, please call us at [phone] or contact us through MyChart.    Sadie Hurd RN, BSN    Kirstie Griffith & Yareli Hough (Scheduling)  Nurse Care Coordinator     Clinic   Adult Congenital and CV Genetics   Adult Congenital and CV Genetic  [facility]   [facility]  (P) [phone]     (P) [phone]  (F) [phone]     (F) [phone]      For after hours urgent needs, call [phone] and ask to speak to the Adult Congenital Physician on call.  Mention Job Code 0401.    For emergencies call 911.    [facility]  [facility]  Mail Code [id number]  [address] [id number]

## 2024-06-12 ENCOUNTER — MYC MEDICAL ADVICE (OUTPATIENT)
Dept: CARDIOLOGY | Facility: CLINIC | Age: 34
End: 2024-06-12
Payer: COMMERCIAL

## 2024-06-12 DIAGNOSIS — Q24.9 ADULT CONGENITAL HEART DISEASE: Primary | ICD-10-CM

## 2024-06-12 DIAGNOSIS — Q21.3 TOF (TETRALOGY OF FALLOT): ICD-10-CM

## 2024-06-12 DIAGNOSIS — I51.7 ENLARGED RV (RIGHT VENTRICLE): ICD-10-CM

## 2024-06-12 DIAGNOSIS — I47.10 NONSUSTAINED SUPRAVENTRICULAR TACHYCARDIA (H): ICD-10-CM

## 2024-06-12 DIAGNOSIS — Q22.2 CONGENITAL PULMONARY VALVE INSUFFICIENCY: ICD-10-CM

## 2024-06-12 LAB
ATRIAL RATE - MUSE: 68 BPM
DIASTOLIC BLOOD PRESSURE - MUSE: NORMAL MMHG
INTERPRETATION ECG - MUSE: NORMAL
P AXIS - MUSE: 43 DEGREES
PR INTERVAL - MUSE: 126 MS
QRS DURATION - MUSE: 126 MS
QT - MUSE: 428 MS
QTC - MUSE: 455 MS
R AXIS - MUSE: 88 DEGREES
SYSTOLIC BLOOD PRESSURE - MUSE: NORMAL MMHG
T AXIS - MUSE: 55 DEGREES
VENTRICULAR RATE- MUSE: 68 BPM

## 2024-06-12 RX ORDER — SODIUM CHLORIDE 9 MG/ML
INJECTION, SOLUTION INTRAVENOUS CONTINUOUS
Status: CANCELLED | OUTPATIENT
Start: 2024-06-12

## 2024-06-12 RX ORDER — LIDOCAINE 40 MG/G
CREAM TOPICAL
Status: CANCELLED | OUTPATIENT
Start: 2024-06-12

## 2024-06-14 ENCOUNTER — MYC MEDICAL ADVICE (OUTPATIENT)
Dept: CARDIOLOGY | Facility: CLINIC | Age: 34
End: 2024-06-14
Payer: COMMERCIAL

## 2024-06-26 ENCOUNTER — HOSPITAL ENCOUNTER (OUTPATIENT)
Dept: CT IMAGING | Facility: CLINIC | Age: 34
Discharge: HOME | End: 2024-06-26
Payer: COMMERCIAL

## 2024-06-26 VITALS — SYSTOLIC BLOOD PRESSURE: 139 MMHG | DIASTOLIC BLOOD PRESSURE: 97 MMHG | HEART RATE: 76 BPM | RESPIRATION RATE: 16 BRPM

## 2024-06-26 DIAGNOSIS — E83.52 HYPERCALCEMIA: ICD-10-CM

## 2024-06-26 DIAGNOSIS — I37.1 NONRHEUMATIC PULMONARY VALVE INSUFFICIENCY: ICD-10-CM

## 2024-06-26 DIAGNOSIS — Q22.2 CONGENITAL PULMONARY VALVE INSUFFICIENCY: ICD-10-CM

## 2024-06-26 DIAGNOSIS — R06.02 SHORTNESS OF BREATH: ICD-10-CM

## 2024-06-26 DIAGNOSIS — I51.7 ENLARGED RV (RIGHT VENTRICLE): ICD-10-CM

## 2024-06-26 DIAGNOSIS — Q24.9 ADULT CONGENITAL HEART DISEASE: ICD-10-CM

## 2024-06-26 DIAGNOSIS — Q21.3 TETRALOGY OF FALLOT: ICD-10-CM

## 2024-06-26 PROCEDURE — 250N000011 HC RX IP 250 OP 636: Performed by: STUDENT IN AN ORGANIZED HEALTH CARE EDUCATION/TRAINING PROGRAM

## 2024-06-26 PROCEDURE — 75573 CT HRT C+ STRUX CGEN HRT DS: CPT | Mod: 26 | Performed by: STUDENT IN AN ORGANIZED HEALTH CARE EDUCATION/TRAINING PROGRAM

## 2024-06-26 PROCEDURE — 75573 CT HRT C+ STRUX CGEN HRT DS: CPT

## 2024-06-26 PROCEDURE — 75574 CT ANGIO HRT W/3D IMAGE: CPT

## 2024-06-26 RX ORDER — DIPHENHYDRAMINE HCL 25 MG
25 CAPSULE ORAL
Status: DISCONTINUED | OUTPATIENT
Start: 2024-06-26 | End: 2024-06-27 | Stop reason: HOSPADM

## 2024-06-26 RX ORDER — IOPAMIDOL 755 MG/ML
120 INJECTION, SOLUTION INTRAVASCULAR ONCE
Status: COMPLETED | OUTPATIENT
Start: 2024-06-26 | End: 2024-06-26

## 2024-06-26 RX ORDER — DIPHENHYDRAMINE HYDROCHLORIDE 50 MG/ML
25-50 INJECTION INTRAMUSCULAR; INTRAVENOUS
Status: DISCONTINUED | OUTPATIENT
Start: 2024-06-26 | End: 2024-06-27 | Stop reason: HOSPADM

## 2024-06-26 RX ORDER — DILTIAZEM HYDROCHLORIDE 120 MG/1
120 TABLET, FILM COATED ORAL
Status: DISCONTINUED | OUTPATIENT
Start: 2024-06-26 | End: 2024-06-27 | Stop reason: HOSPADM

## 2024-06-26 RX ORDER — LIDOCAINE 40 MG/G
CREAM TOPICAL
Status: CANCELLED | OUTPATIENT
Start: 2024-06-26

## 2024-06-26 RX ORDER — METHYLPREDNISOLONE SODIUM SUCCINATE 125 MG/2ML
125 INJECTION, POWDER, LYOPHILIZED, FOR SOLUTION INTRAMUSCULAR; INTRAVENOUS
Status: DISCONTINUED | OUTPATIENT
Start: 2024-06-26 | End: 2024-06-27 | Stop reason: HOSPADM

## 2024-06-26 RX ORDER — DILTIAZEM HYDROCHLORIDE 5 MG/ML
10-15 INJECTION INTRAVENOUS
Status: DISCONTINUED | OUTPATIENT
Start: 2024-06-26 | End: 2024-06-27 | Stop reason: HOSPADM

## 2024-06-26 RX ORDER — METOPROLOL TARTRATE 1 MG/ML
5-15 INJECTION, SOLUTION INTRAVENOUS
Status: DISCONTINUED | OUTPATIENT
Start: 2024-06-26 | End: 2024-06-27 | Stop reason: HOSPADM

## 2024-06-26 RX ORDER — METOPROLOL TARTRATE 25 MG/1
25-100 TABLET, FILM COATED ORAL
Status: DISCONTINUED | OUTPATIENT
Start: 2024-06-26 | End: 2024-06-27 | Stop reason: HOSPADM

## 2024-06-26 RX ORDER — ONDANSETRON 2 MG/ML
4 INJECTION INTRAMUSCULAR; INTRAVENOUS
Status: DISCONTINUED | OUTPATIENT
Start: 2024-06-26 | End: 2024-06-27 | Stop reason: HOSPADM

## 2024-06-26 RX ORDER — IVABRADINE 5 MG/1
5-15 TABLET, FILM COATED ORAL
Status: DISCONTINUED | OUTPATIENT
Start: 2024-06-26 | End: 2024-06-27 | Stop reason: HOSPADM

## 2024-06-26 RX ADMIN — IOPAMIDOL 120 ML: 755 INJECTION, SOLUTION INTRAVENOUS at 10:28

## 2024-06-27 ENCOUNTER — MYC MEDICAL ADVICE (OUTPATIENT)
Dept: CARDIOLOGY | Facility: CLINIC | Age: 34
End: 2024-06-27
Payer: COMMERCIAL

## 2024-06-27 NOTE — LETTER
2024      RE: Brittani Gaviria  [address]  [place] [address]         To whom it may concern,    Brittani Gaviria ( 1990) is followed in the cardiology clinic at [facility]. Brittani is scheduled for a cardiac procedure on 2024. Please allow her to be off for the procedure and the 2 days following. She may return to work on 2024 with restrictions for 10 days after the procedure (2024). Please see workability form for full restrictions. Brittani needs to not lift more than 10 lbs for 10 days.     Please reach out if there are additional questions or concerns.         Casper Valdes MD [facility]  Cardiology - Electrophysiology                            [facility]                      Phone: [phone]    Fax: [phone]

## 2024-06-30 NOTE — H&P
H&P from Dr. Valdes's Office Visit on 6/11/24 reviewed. Following today's exam there are no interval changes.     Patient presents today for EPS/possible ablation pre-TPVR.   The procedural risk of EP study and ablation were discussed in detail. Risks discussed include: vascular complications, CVA, AVB, pericardial effusion and tamponade,. She states understanding and is agreeable to proceed.       KATE Nathan CNP  Electrophysiology Consult Service  Securely message with Telera   Text page via [facility] Paging/Directory

## 2024-07-01 ENCOUNTER — APPOINTMENT (OUTPATIENT)
Dept: MEDSURG UNIT | Facility: CLINIC | Age: 34
End: 2024-07-01
Attending: INTERNAL MEDICINE
Payer: COMMERCIAL

## 2024-07-01 ENCOUNTER — APPOINTMENT (OUTPATIENT)
Dept: LAB | Facility: CLINIC | Age: 34
End: 2024-07-01
Attending: INTERNAL MEDICINE
Payer: COMMERCIAL

## 2024-07-01 ENCOUNTER — HOSPITAL ENCOUNTER (OUTPATIENT)
Facility: CLINIC | Age: 34
Discharge: HOME | End: 2024-07-01
Attending: INTERNAL MEDICINE | Admitting: INTERNAL MEDICINE
Payer: COMMERCIAL

## 2024-07-01 VITALS
RESPIRATION RATE: 31 BRPM | DIASTOLIC BLOOD PRESSURE: 78 MMHG | BODY MASS INDEX: 30.94 KG/M2 | WEIGHT: 191.58 LBS | TEMPERATURE: 98.4 F | OXYGEN SATURATION: 100 % | SYSTOLIC BLOOD PRESSURE: 131 MMHG | HEART RATE: 90 BPM

## 2024-07-01 DIAGNOSIS — Q22.2 CONGENITAL PULMONARY VALVE INSUFFICIENCY: ICD-10-CM

## 2024-07-01 DIAGNOSIS — I47.10 NONSUSTAINED SUPRAVENTRICULAR TACHYCARDIA (H): ICD-10-CM

## 2024-07-01 DIAGNOSIS — I51.7 ENLARGED RV (RIGHT VENTRICLE): ICD-10-CM

## 2024-07-01 DIAGNOSIS — Q21.3 TOF (TETRALOGY OF FALLOT): ICD-10-CM

## 2024-07-01 DIAGNOSIS — Q24.9 ADULT CONGENITAL HEART DISEASE: ICD-10-CM

## 2024-07-01 LAB
ANION GAP SERPL CALCULATED.3IONS-SCNC: 9 MMOL/L (ref 7–15)
BUN SERPL-MCNC: 8.6 MG/DL (ref 6–20)
CALCIUM SERPL-MCNC: 10.2 MG/DL (ref 8.6–10)
CHLORIDE SERPL-SCNC: 108 MMOL/L (ref 98–107)
CREAT SERPL-MCNC: 0.86 MG/DL (ref 0.51–0.95)
DEPRECATED HCO3 PLAS-SCNC: 25 MMOL/L (ref 22–29)
EGFRCR SERPLBLD CKD-EPI 2021: 90 ML/MIN/1.73M2
ERYTHROCYTE [DISTWIDTH] IN BLOOD BY AUTOMATED COUNT: 14.1 % (ref 10–15)
GLUCOSE SERPL-MCNC: 77 MG/DL (ref 70–99)
HCG INTACT+B SERPL-ACNC: <1 MIU/ML
HCT VFR BLD AUTO: 42.4 % (ref 35–47)
HGB BLD-MCNC: 13.5 G/DL (ref 11.7–15.7)
MCH RBC QN AUTO: 28.4 PG (ref 26.5–33)
MCHC RBC AUTO-ENTMCNC: 31.8 G/DL (ref 31.5–36.5)
MCV RBC AUTO: 89 FL (ref 78–100)
PLATELET # BLD AUTO: 245 10E3/UL (ref 150–450)
POTASSIUM SERPL-SCNC: 4 MMOL/L (ref 3.4–5.3)
RBC # BLD AUTO: 4.75 10E6/UL (ref 3.8–5.2)
SODIUM SERPL-SCNC: 142 MMOL/L (ref 135–145)
WBC # BLD AUTO: 7.8 10E3/UL (ref 4–11)

## 2024-07-01 PROCEDURE — 999N000142 HC STATISTIC PROCEDURE PREP ONLY

## 2024-07-01 PROCEDURE — C1732 CATH, EP, DIAG/ABL, 3D/VECT: HCPCS | Performed by: INTERNAL MEDICINE

## 2024-07-01 PROCEDURE — 99152 MOD SED SAME PHYS/QHP 5/>YRS: CPT | Performed by: INTERNAL MEDICINE

## 2024-07-01 PROCEDURE — 93010 ELECTROCARDIOGRAM REPORT: CPT | Performed by: INTERNAL MEDICINE

## 2024-07-01 PROCEDURE — 99152 MOD SED SAME PHYS/QHP 5/>YRS: CPT | Mod: GC | Performed by: INTERNAL MEDICINE

## 2024-07-01 PROCEDURE — 85027 COMPLETE CBC AUTOMATED: CPT | Performed by: INTERNAL MEDICINE

## 2024-07-01 PROCEDURE — 82374 ASSAY BLOOD CARBON DIOXIDE: CPT | Performed by: INTERNAL MEDICINE

## 2024-07-01 PROCEDURE — 250N000009 HC RX 250: Performed by: INTERNAL MEDICINE

## 2024-07-01 PROCEDURE — 99153 MOD SED SAME PHYS/QHP EA: CPT | Performed by: INTERNAL MEDICINE

## 2024-07-01 PROCEDURE — 250N000011 HC RX IP 250 OP 636: Performed by: INTERNAL MEDICINE

## 2024-07-01 PROCEDURE — 36415 COLL VENOUS BLD VENIPUNCTURE: CPT | Performed by: INTERNAL MEDICINE

## 2024-07-01 PROCEDURE — C1894 INTRO/SHEATH, NON-LASER: HCPCS | Performed by: INTERNAL MEDICINE

## 2024-07-01 PROCEDURE — 93005 ELECTROCARDIOGRAM TRACING: CPT

## 2024-07-01 PROCEDURE — 999N000134 HC STATISTIC PP CARE STAGE 3

## 2024-07-01 PROCEDURE — 999N000054 HC STATISTIC EKG NON-CHARGEABLE

## 2024-07-01 PROCEDURE — 93613 INTRACARDIAC EPHYS 3D MAPG: CPT | Mod: GC | Performed by: INTERNAL MEDICINE

## 2024-07-01 PROCEDURE — 93620 COMP EP EVL R AT VEN PAC&REC: CPT | Performed by: INTERNAL MEDICINE

## 2024-07-01 PROCEDURE — 272N000001 HC OR GENERAL SUPPLY STERILE: Performed by: INTERNAL MEDICINE

## 2024-07-01 PROCEDURE — 258N000003 HC RX IP 258 OP 636: Performed by: INTERNAL MEDICINE

## 2024-07-01 PROCEDURE — 93620 COMP EP EVL R AT VEN PAC&REC: CPT | Mod: 26 | Performed by: INTERNAL MEDICINE

## 2024-07-01 PROCEDURE — 84702 CHORIONIC GONADOTROPIN TEST: CPT | Performed by: INTERNAL MEDICINE

## 2024-07-01 PROCEDURE — 93613 INTRACARDIAC EPHYS 3D MAPG: CPT | Performed by: INTERNAL MEDICINE

## 2024-07-01 PROCEDURE — C1887 CATHETER, GUIDING: HCPCS | Performed by: INTERNAL MEDICINE

## 2024-07-01 RX ORDER — MIDAZOLAM HCL IN 0.9 % NACL/PF 1 MG/ML
PLASTIC BAG, INJECTION (ML) INTRAVENOUS CONTINUOUS PRN
Status: DISCONTINUED | OUTPATIENT
Start: 2024-07-01 | End: 2024-07-01

## 2024-07-01 RX ORDER — ONDANSETRON 2 MG/ML
INJECTION INTRAMUSCULAR; INTRAVENOUS
Status: DISCONTINUED | OUTPATIENT
Start: 2024-07-01 | End: 2024-07-01 | Stop reason: HOSPADM

## 2024-07-01 RX ORDER — NALOXONE HYDROCHLORIDE 0.4 MG/ML
0.4 INJECTION, SOLUTION INTRAMUSCULAR; INTRAVENOUS; SUBCUTANEOUS
Status: DISCONTINUED | OUTPATIENT
Start: 2024-07-01 | End: 2024-07-01 | Stop reason: HOSPADM

## 2024-07-01 RX ORDER — OXYCODONE AND ACETAMINOPHEN 5; 325 MG/1; MG/1
1 TABLET ORAL EVERY 4 HOURS PRN
Status: DISCONTINUED | OUTPATIENT
Start: 2024-07-01 | End: 2024-07-01 | Stop reason: HOSPADM

## 2024-07-01 RX ORDER — SODIUM CHLORIDE 9 MG/ML
INJECTION, SOLUTION INTRAVENOUS CONTINUOUS
Status: DISCONTINUED | OUTPATIENT
Start: 2024-07-01 | End: 2024-07-01

## 2024-07-01 RX ORDER — LIDOCAINE 40 MG/G
CREAM TOPICAL
Status: DISCONTINUED | OUTPATIENT
Start: 2024-07-01 | End: 2024-07-01

## 2024-07-01 RX ORDER — ASPIRIN 81 MG/1
81 TABLET ORAL DAILY
COMMUNITY

## 2024-07-01 RX ORDER — DIPHENHYDRAMINE HYDROCHLORIDE 50 MG/ML
INJECTION INTRAMUSCULAR; INTRAVENOUS
Status: DISCONTINUED | OUTPATIENT
Start: 2024-07-01 | End: 2024-07-01

## 2024-07-01 RX ORDER — NALOXONE HYDROCHLORIDE 0.4 MG/ML
0.2 INJECTION, SOLUTION INTRAMUSCULAR; INTRAVENOUS; SUBCUTANEOUS
Status: DISCONTINUED | OUTPATIENT
Start: 2024-07-01 | End: 2024-07-01 | Stop reason: HOSPADM

## 2024-07-01 RX ORDER — FENTANYL CITRATE 50 UG/ML
INJECTION, SOLUTION INTRAMUSCULAR; INTRAVENOUS
Status: DISCONTINUED | OUTPATIENT
Start: 2024-07-01 | End: 2024-07-01

## 2024-07-01 RX ORDER — CEFAZOLIN SODIUM 2 G/100ML
INJECTION, SOLUTION INTRAVENOUS CONTINUOUS PRN
Status: DISCONTINUED | OUTPATIENT
Start: 2024-07-01 | End: 2024-07-01

## 2024-07-01 RX ADMIN — SODIUM CHLORIDE: 9 INJECTION, SOLUTION INTRAVENOUS at 13:56

## 2024-07-01 ASSESSMENT — ACTIVITIES OF DAILY LIVING (ADL)
ADLS_ACUITY_SCORE: 35

## 2024-07-01 NOTE — PROGRESS NOTES
Patient prepped for comprehensive EP study. VSS, EKG complete. Consent completed. PIV placed in left AC. Groin clipped, pedal pulses marked. Appropriately NPO. Labs WNL. Neuro's intact.  Pascual will pick patient up, he would like update via phone from provider after procedure [phone].

## 2024-07-01 NOTE — Clinical Note
dry, intact, no bleeding and no hematoma. 8.5 fr sheath removed from right femoral vein. Figure 8 suture deployed. Light manual pressure held. Hemostasis achieved. No dressing applied.

## 2024-07-01 NOTE — DISCHARGE INSTRUCTIONS
Post Comprehensive EP Study  Discharge Instructions    Care of groin site:        Remove the Band-Aid after 24 hours. If there is minor oozing, apply another Band-aid and remove it after 12 hours.         Do NOT take a bath, use a hot tub, pool, or submerse in water for at least 3 days You may shower.         It is normal to have a small bruise or lump at the site.        Do not scrub the site.        Do not use lotion or powder near the puncture site for 3 days.        For the first 2 days: Do not stoop or squat. When you cough, sneeze or move your bowels, hold your hand over the puncture site and press gently.        Do not lift more than 10 pounds or exertional activity for 10 days.      If you start bleeding from the site in your groin:  Lie down flat and press firmly on the site.  Call your physician immediately, or, come to the emergency room.    Call 911 right away if you have bleeding that is heavy or does not stop.     Call your doctor/provider if:        You have a large or growing hard lump around the site.        The site is red, swollen, hot or tender.        Blood or fluid is draining from the site.        You have chills or a fever greater than 101 F (38 C).        Your leg or arm turns bluish, feels numb or cool.        You have hives, a rash or unusual itching.       Your Care Team:EP Cardiology    [phone]    After business hours: [phone], select option 4, and ask for EP Fellow on-call to be paged.       As always, Thank you for trusting us with your health care needs

## 2024-07-01 NOTE — PROGRESS NOTES
Patient off bedrest, figure 8 suture removed from right groin site. Site flat, no bleeding or swelling, covered with primapore. Patient ambulated, voided, eating and drinking without issue. VSS. Heart rhythm on monitor sinus rhythm with bundle branch block and occasional PVCs. This is not new for patient, Deyanira Edwards NP and Dr. Mills and Dr. Valdes at bedside post procedure were verbally told by RN. Discharge instructions gone over with patient.  picking patient up.

## 2024-07-01 NOTE — PROGRESS NOTES
EP PROCEDURE NOTE  ?  Procedures:  1. Electrophysiological Study  2. 3D Mapping using Carto3.  ?  Electrophysiologist: Casper Valdes MD   EP Fellow: Jimenez Mills MD PhD  ?  Pre-operative Diagnosis: Tetralogy of Fallot with Plans to do Percutaneous Pulmonary Valve Replacement.  Post-operative diagnosis: No inducible VT  Complications: None.  Fluoroscopy time/dose: See Procedure Log    Clinical Profile:   Ms. Gaviria is a 33 year old female who has a past medical history significant for TOF s/p surgical repair at 11 months, s/p pulmonary valve repair at 7 years, s/p pulmonary valve replacement at age 13 years, and palpitations. [facility] team is planning transcatheter PVR. As part of this evaluation will plan for pre-procedure EPS. Given the patient has had worsening PV insufficiency with prior interventions many years ago, will need to evaluate her for ventricular arrhythmias arising from the side of the surgical repair which is also the area that is going to be covered by the stent of the prosthetic pulmonic vein. She presents for EP study today.    PROCEDURE  The risks and benefits of the procedure were explained to the patient in full. The risks include, but are not limited to: pain, bleeding,blood transfusion reactions, arrhythmia, dissection of vessels,cardiac perforation, pericardial effusion, stroke, and death. Informed Consent was obtained. The patient was brought to the EP lab in a fasting and hemodynamically stable condition. The patient was prepped and draped in a sterile fashion.    Sheaths and Catheters:  After local anesthesia with 2% lidocaine, vascular access was obtained using the modified Seldinger technique for the following access points:    Right Femoral Vein:  - 8Fr Locking Sheath: A decanav catheter was positioned into the RV with specific attention paid to the RVOT.  ?  EP study results (in milliseconds):  Pre-ablation: In Sinus rhythm, AA= 742, VV= 742, MA= 137, CFL=721, QT= 446.  ?  Procedure  Description:  After the above sheaths were in place, the catheters were positioned under fluoroscopic guidance.    Induction:  Pacing was perfomed form : RVOT; including anterior, lateral and septal areas.    After doing a 3D map of the RV / RVOT in Carto3 with FAM; we demonstrated mostly healthy RV tissue with scar in the area of the pulmonary conduit. We the attempted pacing from the three areas in RVOT at areas.     We paced the following sequences after verifying capture:  Singles--up to 240 ms  PES--up to 400/240/240/240     We did this both on and off of isopreterenol at 3 micrograms/min.    VT/Arrhythmias induced:  - None    ASSESSMENT:  1. Tetralogy of fallot status post surgical repair at 11 months, pulmonary valve repair at 7 years, and pulmonary valve replacement at 14 years.  2. Successful EP study with no inducible VT.  ?  PLAN:  1. 2 hours of bedrest  2. Anticipate discharge later today per protocol  3. Follow up with [facility] team for transcatheter PVR planning.    Procedure was supervised by Dr. Valdes.    Jimenez Mills MD PhD  Cardiac Electrophysiology Fellow  P: Text Page

## 2024-07-01 NOTE — PROGRESS NOTES
Patient returned s/p EP comprehensive study. VSS , patient is still sleepy but responds to voice. Right groin site intact with figure 8 suture, no bleeding or swelling. Bedrest until 1830. Neuros intact. Sinus Rhythm with frequent PAC's on monitor, 12 lead EKG post procedure completed. Deyanira Edwards paged and aware, no new orders. updated by provider.

## 2024-07-01 NOTE — Clinical Note
Called to 2A JOSSIE Weeks. All questions answered. All belongings sent with patient. Patient transported to 2A via stretcher with CCL RN.

## 2024-07-01 NOTE — PRE-PROCEDURE
GENERAL PRE-PROCEDURE:     Written consent obtained?: Yes    Risks and benefits: Risks, benefits and alternatives were discussed    Consent given by:  Patient  Patient states understanding of procedure being performed: Yes    Patient's understanding of procedure matches consent: Yes    Procedure consent matches procedure scheduled: Yes    Expected level of sedation:  Moderate  Appropriately NPO:  Yes  ASA Class:  3  Mallampati  :  Grade 1- soft palate, uvula, tonsillar pillars, and posterior pharyngeal wall visible  Lungs:  Lungs clear with good breath sounds bilaterally  Heart:  Normal heart sounds and rate  History & Physical reviewed:  History and physical reviewed and no updates needed  Statement of review:  I have reviewed the lab findings, diagnostic data, medications, and the plan for sedation

## 2024-07-02 ENCOUNTER — MYC MEDICAL ADVICE (OUTPATIENT)
Dept: CARDIOLOGY | Facility: CLINIC | Age: 34
End: 2024-07-02
Payer: COMMERCIAL

## 2024-07-02 DIAGNOSIS — Z95.4 S/P TRANSCATHETER REPLACEMENT OF PULMONARY VALVE: ICD-10-CM

## 2024-07-02 DIAGNOSIS — Q24.9 ADULT CONGENITAL HEART DISEASE: Primary | ICD-10-CM

## 2024-07-02 DIAGNOSIS — Q21.3 TETRALOGY OF FALLOT: ICD-10-CM

## 2024-07-02 DIAGNOSIS — Q22.2 CONGENITAL PULMONARY VALVE INSUFFICIENCY: ICD-10-CM

## 2024-07-02 DIAGNOSIS — I37.1 NONRHEUMATIC PULMONARY VALVE INSUFFICIENCY: ICD-10-CM

## 2024-07-02 DIAGNOSIS — Q21.3 TOF (TETRALOGY OF FALLOT): ICD-10-CM

## 2024-07-03 LAB
ATRIAL RATE - MUSE: 70 BPM
DIASTOLIC BLOOD PRESSURE - MUSE: NORMAL MMHG
INTERPRETATION ECG - MUSE: NORMAL
P AXIS - MUSE: 1 DEGREES
PR INTERVAL - MUSE: 142 MS
QRS DURATION - MUSE: 142 MS
QT - MUSE: 432 MS
QTC - MUSE: 466 MS
R AXIS - MUSE: 88 DEGREES
SYSTOLIC BLOOD PRESSURE - MUSE: NORMAL MMHG
T AXIS - MUSE: 59 DEGREES
VENTRICULAR RATE- MUSE: 70 BPM

## 2024-07-07 ENCOUNTER — HEALTH MAINTENANCE LETTER (OUTPATIENT)
Age: 34
End: 2024-07-07

## 2024-07-08 LAB
ATRIAL RATE - MUSE: 70 BPM
DIASTOLIC BLOOD PRESSURE - MUSE: NORMAL MMHG
INTERPRETATION ECG - MUSE: NORMAL
P AXIS - MUSE: 2 DEGREES
PR INTERVAL - MUSE: 132 MS
QRS DURATION - MUSE: 134 MS
QT - MUSE: 428 MS
QTC - MUSE: 462 MS
R AXIS - MUSE: 89 DEGREES
SYSTOLIC BLOOD PRESSURE - MUSE: NORMAL MMHG
T AXIS - MUSE: 52 DEGREES
VENTRICULAR RATE- MUSE: 70 BPM

## 2024-07-12 NOTE — TELEPHONE ENCOUNTER
Date: 7/12/2024    Time of Call: 2:25 PM     Diagnosis:  work up for TPVR     [ TORB ] Ordering provider: Neil Wilkins MD  Order: upper and lower US access      Order received by: Sadie Hurd RN     Follow-up/additional notes:

## 2024-07-16 ENCOUNTER — MYC MEDICAL ADVICE (OUTPATIENT)
Dept: CARDIOLOGY | Facility: CLINIC | Age: 34
End: 2024-07-16
Payer: COMMERCIAL

## 2024-07-17 DIAGNOSIS — Z29.8 * * * SBE PROPHYLAXIS * * *: ICD-10-CM

## 2024-07-18 ENCOUNTER — HOSPITAL ENCOUNTER (OUTPATIENT)
Dept: ULTRASOUND IMAGING | Facility: CLINIC | Age: 34
Discharge: HOME | End: 2024-07-18
Payer: COMMERCIAL

## 2024-07-18 ENCOUNTER — OFFICE VISIT (OUTPATIENT)
Dept: PEDIATRIC CARDIOLOGY | Facility: CLINIC | Age: 34
End: 2024-07-18
Attending: FAMILY MEDICINE
Payer: COMMERCIAL

## 2024-07-18 VITALS
WEIGHT: 195.11 LBS | DIASTOLIC BLOOD PRESSURE: 101 MMHG | SYSTOLIC BLOOD PRESSURE: 147 MMHG | HEART RATE: 71 BPM | OXYGEN SATURATION: 99 % | RESPIRATION RATE: 16 BRPM | HEIGHT: 66 IN | BODY MASS INDEX: 31.36 KG/M2

## 2024-07-18 DIAGNOSIS — Q22.2 CONGENITAL PULMONARY VALVE INSUFFICIENCY: ICD-10-CM

## 2024-07-18 DIAGNOSIS — Q21.3 TETRALOGY OF FALLOT: ICD-10-CM

## 2024-07-18 DIAGNOSIS — Q21.3 TOF (TETRALOGY OF FALLOT): Primary | ICD-10-CM

## 2024-07-18 DIAGNOSIS — Q24.9 ADULT CONGENITAL HEART DISEASE: ICD-10-CM

## 2024-07-18 DIAGNOSIS — Q21.3 TOF (TETRALOGY OF FALLOT): ICD-10-CM

## 2024-07-18 DIAGNOSIS — Z95.4 S/P TRANSCATHETER REPLACEMENT OF PULMONARY VALVE: ICD-10-CM

## 2024-07-18 PROCEDURE — 93971 EXTREMITY STUDY: CPT | Mod: XS

## 2024-07-18 PROCEDURE — 93971 EXTREMITY STUDY: CPT | Mod: 26 | Performed by: RADIOLOGY

## 2024-07-18 PROCEDURE — G0463 HOSPITAL OUTPT CLINIC VISIT: HCPCS | Mod: 25 | Performed by: PEDIATRICS

## 2024-07-18 PROCEDURE — 99215 OFFICE O/P EST HI 40 MIN: CPT | Performed by: PEDIATRICS

## 2024-07-18 PROCEDURE — 99417 PROLNG OP E/M EACH 15 MIN: CPT | Performed by: PEDIATRICS

## 2024-07-18 PROCEDURE — 93882 EXTRACRANIAL UNI/LTD STUDY: CPT | Mod: 26 | Performed by: RADIOLOGY

## 2024-07-18 PROCEDURE — 93971 EXTREMITY STUDY: CPT

## 2024-07-18 PROCEDURE — 93926 LOWER EXTREMITY STUDY: CPT | Mod: 26 | Performed by: RADIOLOGY

## 2024-07-18 RX ORDER — SUMATRIPTAN 50 MG/1
50 TABLET, FILM COATED ORAL
COMMUNITY
Start: 2024-07-03

## 2024-07-18 NOTE — NURSING NOTE
"Chief Complaint   Patient presents with    Pre-Op Exam       Vitals:    07/18/24 0801   BP: (!) 147/101   BP Location: Right arm   Patient Position: Sitting   Cuff Size: Adult Large   Pulse: 71   Resp: 16   SpO2: 99%   Weight: 195 lb 1.7 oz (88.5 kg)   Height: 5' 6.42\" (168.7 cm)     Patient MyChart Active? Yes  If no, would they like to sign up? N/A    Vinay Hodge  July 18, 2024  "

## 2024-07-18 NOTE — PATIENT INSTRUCTIONS
[facility]  [address]  [address]  [place] [facility] [id number] [phone]      Cardiology Clinic   RN Care Coordinators: Юлия Lafleur, Beryl Soria  or Nya Rosa [phone]  Dr. Smith RN Care Coordinators  [phone]    Pediatric Cardiology Scheduling  [phone]    After Hours and Emergency Contact Number  [phone]  * Ask for the pediatric cardiologist on call         Prescription Renewals  The pharmacy must fax requests to [phone]  * Please allow 3-4 days for prescriptions to be authorized   Pediatric Call Center/ General Scheduling  [phone]    Imaging Scheduling for Peds Cardiology  [phone]  THEY WILL REACH OUT TO YOU TO SCHEDULE ANY IMAGING NEEDS THAT WERE ORDERED.    Your feedback is very important to us. If you receive a survey about your visit today, please take the time to fill this out so we can continue to improve.    We have several different opportunities for cardiology patients that include:    [email or web address]  [email or web address]  [email or web address]

## 2024-07-18 NOTE — LETTER
"7/18/2024      RE: Brittani Gaviria  [address]  [place] [address]     Dear Colleague,    Thank you for the opportunity to participate in the care of your patient, Brittani Gaviria, at the [facility] at [facility]. Please see a copy of my visit note below.                                                Pediatric Cardiology Clinic Note    Patient:  Brittani Gaviria MRN:  [id number]   YOB: 1990 Age:  33 year old   Date of Visit:  Jul 18, 2024 PCP:  Leila Tay DO     Dear Leila Harris DO:    I had the pleasure of seeing your patient Brittani Gaviria at the [facility] for a consultation on Jul 18, 2024 for evaluation of TOF.     History of Present Illness:     Brittani is a 33 year old with     TOF  S/P TAP at 11 months of age  SP \"valve repair\" at age 7 yrs ([facility] in [place], [place])  S/P 25 mm RV-PA homograft  ([facility] in [place], [place] , Dr Yue Johnson and Dr. Johnson) at 10 yrs of age (7/31/2001) (surgical report scanned on 4/26/24)  Hyperparathyroidism sp resection 7/2018 at [facility]  Sp C section x1 for the fourth pregnancy  SP ventral hernia repair, Oct 2022  Biliary dyskinesia S/P lap cholecystectomy 5/16/2014  SP EP study with no inducible VT, 7/1/2024 ([facility])    This is the first time I meet Brittani. She is currently being followed by Dr. Neil Wilkins and is referring her for TPVR candidacy. She recently underwent a CMR and CTA. She reports that mainly over the past 1-2 years she has become significantly short of breath and less energetic compared to previous years. She has 4 children and it has become harder to keep up with them.     Her most recent CXR is worse compared to 2022 and given worsening symptoms she is being referred for TPVR.     She last saw her dentist 1 month ago and is " "schedule for caries extraction next week.     Past Medical History:     PMH/Birth Hx:  The past medical history was reviewed with the patient and family today and updated    Past surgical Hx: As above    No recent ER visits or hospitalizations. No history of asthma.   Immunizations UTD per parents.   She has a current medication list which includes the following prescription(s): acetaminophen, amoxicillin, aspirin, benzoyl peroxide, blood pressure cuff, cholecalciferol, clindamycin, cyclobenzaprine, diltiazem, diltiazem er coated beads, fluticasone, losartan, ondansetron, retin-a, spironolactone, sumatriptan, naratriptan, and ventolin hfa.     Sheis allergic to blood transfusion related (informational only), ketorolac, and ferumoxytol.      Family and Social History:     The family history was reviewed and updated today. No significant changes were noted.   Mom/Parents report that there is no family history of congenital heart disease, early/unexplained sudden deaths, persons needing pacemakers/defibrillators at a young age.    Mom/Parents report that there is no family history of WPW syndrome, Brugada syndrome, or long QT syndrome.      Lives at home with with  and 4 children age 14, 12, 7 and 5 yrs. 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     Review of Systems: A comprehensive review of systems was performed and is negative, except as noted in the HPI and PMH    Physical exam:  Her height is 1.687 m (5' 6.42\") and weight is 88.5 kg (195 lb 1.7 oz). Her blood pressure is 147/101 (abnormal) and her pulse is 71. Her respiration is 16 and oxygen saturation is 99%.   Her body mass index is 31.1 kg/m .  Her body surface area is 2.04 meters squared.  There is no central or peripheral cyanosis. Pupils are reactive and sclera are not jaundiced. There is no conjunctival injection or discharge. EOMI. Mucous membranes are moist and pink.   Lungs are clear to ausculation bilaterally with no wheezes, rales or rhonchi. " "There is no increased work of breathing, retractions or nasal flaring. Precordium is quiet with a normally placed apical impulse. On auscultation, regular with occasional ectopy, +click, +systolic murmur best heard at LUSB, JVD negative when sitting upright .  Abdomen is soft and non-tender without masses or hepatomegaly. Femoral pulses are normal with no brachial femoral delay.Skin is without rashes, lesions, or significant bruising. Extremities are warm and well-perfused with no cyanosis, clubbing or edema. Peripheral pulses are normal and there is < 2 sec capillary refill. Patient is alert and oriented and moves all extremities equally with normal tone.     Vitals:    07/18/24 0801   BP: (!) 147/101   BP Location: Right arm   Patient Position: Sitting   Cuff Size: Adult Large   Pulse: 71   Resp: 16   SpO2: 99%   Weight: 88.5 kg (195 lb 1.7 oz)   Height: 1.687 m (5' 6.42\")            Investigations and lab work:     Previous Investigations:  I personally reviewed the results of the patients previous investigations listed below.  Chest MRA/cMRI:  11/2023  RDEVI 113ml/m2  cMRI  Normal biventricular function, LVEF 63% and RVEF 54%  Right ventricular end-diastolic volume index = 113 mL/m2, RVESV 51.7 mL/m2  Moderate pulmonary insufficiency (regurgitant volume 47 mL, regurgitant faction 39%).        TTE:  3/2023  Patient after transannular patch repair for tetralogy of Fallot. There is no residual ventricular level shunt. There is mild to moderate right ventricular enlargement. Normal right ventricular systolic function. Qualitatively normal left ventricular systolic function. Trivial tricuspid valve insufficiency.   There is mild flow acceleration across the pulmonary valve with a peak gradient across the pulmonary valve of 22 mmHg. Moderate to severe (3-4+) pulmonary valve insufficiency. There is mild dilation of the aortic root at the level of the sinuses of Valsalva.        CPX 11/16/2023  Peak VO2 (ml/kg/min) " VE/VCO2  [place] RER   17.90        30.54        1.16             Predicted VO2 (ml/kg/min) Predicted VO2 %   32.50        55             Resting Supine BP (mmHg) Resting Standing BP (mmHg) Final Stress BP (mmHg)   148/96        138/90        208/96          Resting Supine HR (bpm) Resting Standing HR (bpm)     72        80               Max HR (bpm) Max Predicted HR (bpm) Max Perdicted HR %   167        186        90             Exercise time (min) Estimated workload (METS)   7        5.1              A cardiopulmonary stress test was performed following a Mac ramp protocol with the patient exercising for 7 minutes under the supervision of Dr. Becerril.  Heart rate demonstrated a normal response to exercise.  Blood pressure demonstrated a hypertensive response to exercise.    The stress electrocardiogram is negative for inducible ischemic EKG changes.    The patient's exercise capacity is severely impaired.    A prior study was conducted on 11/16/2022. When compared to the previous test, this represents an 11% decrease in functional capacity.     The patient achieved a severely impaired, class II functional capacity with a cardiac limitation to exercise. A cardiac limitation is suggested given the low peak VO2 and oxygen pulse as well as frequent ectopy, including a 6 beat run of ventricular tachycardia.     ECHO 5/23/2024: Moderate to severe pulmonary valve insufficiency. No pulmonary valve stenosis, peak gradient 12 mmHg. There is no residual ventricular level shunt. Trivial tricuspid and mitral valve insufficiency. There is mild to moderate right ventricular enlargement with normal right ventricular systolic function.    Normal left ventricular size and systolic function. The calculated single plane left ventricular ejection fraction from the 4 chamber view is 68%. There is dilation of the aortic root at the level of the sinuses of Valsalva. No aortic valve insufficiency.    CTA 6/26/2024        The left  coronary arises from a posterior sinus and courses inferiorly to the distal MPA        RCA is clockwise rotated and arises from left kumari sinus. Has a very short interarterial course.              Assessment and Plan:     In summary, Brittani is a 33 year old with TOF sp 25 mm RV-PA homograft with increase in symptoms, abnormal CXT and mild abnormal CMR. We discussed today in great detail her anatomy using a heart diagram and the most recent CTA.     I think Brittani Gaviria will benefit from cardiac catheterisation and TPVR given worsening in symptoms, abnormal CXR and altough not significant also abnormal RVEDV, RVEDV>x2 RVESV. I discussed with Previous in detail about the options including cardiac catheterisation and surgery. I explained to her the details of cardiac catheterization including the risks and benefits of the procedure. I showed her the CTA images and relayed my concern of her RCA being close to her conduit, the need to do compression tenting before moving forward with the TPVR. The goal will be to place a balloon expandable valve, Myriam S3 valve, across it.  She voiced understanding of the risks of the procedure and would like to go ahead with cardiac catheterisation which will be scheduled in 8/16/2024.     She is scheduled for dental caries extraction next week. Assuming her dentition are free from caries afterwards we will move forward with the TPVR for now. She should continue taking ASA.     Thank you for the opportunity to participate in the care of Brittani Gaviria . Please do not hesitate to call with questions or concerns.    Sincerely,    Johnathon Hernandez MD  Pediatric Cardiology      60 min spent on the date of the encounter in chart review, patient visit, review of tests, documentation and/or discussion with other providers about the issues documented above.     CC  Patient Care Team:  Leila Tay DO as PCP - General        [Note: Chart documentation done in part with  [employer] Voice Recognition software. Although reviewed after completion, some word and grammatical errors may remain.]

## 2024-07-18 NOTE — PROGRESS NOTES
"                                                           Pediatric Cardiology Clinic Note    Patient:  Brittani Gaviria MRN:  [id number]   YOB: 1990 Age:  33 year old   Date of Visit:  Jul 18, 2024 PCP:  Leila Tay DO     Dear Leila Harris DO:    I had the pleasure of seeing your patient Brittani Gaviria at the [facility] for a consultation on Jul 18, 2024 for evaluation of TOF.     History of Present Illness:     Brittani is a 33 year old with     TOF  S/P TAP at 11 months of age  SP \"valve repair\" at age 7 yrs ([facility] in [place], [place])  S/P 25 mm RV-PA homograft  ([facility] in [place], [place] , Dr Yue Johnson and Dr. Johnson) at 10 yrs of age (7/31[birth year]2001) (surgical report scanned on 4/26/24)  Hyperparathyroidism sp resection 7/2018 at [facility]  Sp C section x1 for the fourth pregnancy  SP ventral hernia repair, Oct 2022  Biliary dyskinesia S/P lap cholecystectomy 5/16/2014  SP EP study with no inducible VT, 7/1/2024 ([facility])    This is the first time I meet Brittani. She is currently being followed by Dr. Neil Wilkins and is referring her for TPVR candidacy. She recently underwent a CMR and CTA. She reports that mainly over the past 1-2 years she has become significantly short of breath and less energetic compared to previous years. She has 4 children and it has become harder to keep up with them.     Her most recent CXR is worse compared to 2022 and given worsening symptoms she is being referred for TPVR.     She last saw her dentist 1 month ago and is schedule for caries extraction next week.     Past Medical History:     PMH/Birth Hx:  The past medical history was reviewed with the patient and family today and updated    Past surgical Hx: As above    No recent ER visits or hospitalizations. No history of asthma.   Immunizations UTD per parents.   She has a current medication list which " "includes the following prescription(s): acetaminophen, amoxicillin, aspirin, benzoyl peroxide, blood pressure cuff, cholecalciferol, clindamycin, cyclobenzaprine, diltiazem, diltiazem er coated beads, fluticasone, losartan, ondansetron, retin-a, spironolactone, sumatriptan, naratriptan, and ventolin hfa.     Sheis allergic to blood transfusion related (informational only), ketorolac, and ferumoxytol.      Family and Social History:     The family history was reviewed and updated today. No significant changes were noted.   Mom/Parents report that there is no family history of congenital heart disease, early/unexplained sudden deaths, persons needing pacemakers/defibrillators at a young age.    Mom/Parents report that there is no family history of WPW syndrome, Brugada syndrome, or long QT syndrome.      Lives at home with with  and 4 children age 14, 12, 7 and 5 yrs. 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     Review of Systems: A comprehensive review of systems was performed and is negative, except as noted in the HPI and PMH    Physical exam:  Her height is 1.687 m (5' 6.42\") and weight is 88.5 kg (195 lb 1.7 oz). Her blood pressure is 147/101 (abnormal) and her pulse is 71. Her respiration is 16 and oxygen saturation is 99%.   Her body mass index is 31.1 kg/m .  Her body surface area is 2.04 meters squared.  There is no central or peripheral cyanosis. Pupils are reactive and sclera are not jaundiced. There is no conjunctival injection or discharge. EOMI. Mucous membranes are moist and pink.   Lungs are clear to ausculation bilaterally with no wheezes, rales or rhonchi. There is no increased work of breathing, retractions or nasal flaring. Precordium is quiet with a normally placed apical impulse. On auscultation, regular with occasional ectopy, +click, +systolic murmur best heard at LUSB, JVD negative when sitting upright .  Abdomen is soft and non-tender without masses or hepatomegaly. Femoral pulses are " "normal with no brachial femoral delay.Skin is without rashes, lesions, or significant bruising. Extremities are warm and well-perfused with no cyanosis, clubbing or edema. Peripheral pulses are normal and there is < 2 sec capillary refill. Patient is alert and oriented and moves all extremities equally with normal tone.     Vitals:    07/18/24 0801   BP: (!) 147/101   BP Location: Right arm   Patient Position: Sitting   Cuff Size: Adult Large   Pulse: 71   Resp: 16   SpO2: 99%   Weight: 88.5 kg (195 lb 1.7 oz)   Height: 1.687 m (5' 6.42\")            Investigations and lab work:     Previous Investigations:  I personally reviewed the results of the patients previous investigations listed below.  Chest MRA/cMRI:  11/2023  RDEVI 113ml/m2  cMRI  Normal biventricular function, LVEF 63% and RVEF 54%  Right ventricular end-diastolic volume index = 113 mL/m2, RVESV 51.7 mL/m2  Moderate pulmonary insufficiency (regurgitant volume 47 mL, regurgitant faction 39%).        TTE:  3/2023  Patient after transannular patch repair for tetralogy of Fallot. There is no residual ventricular level shunt. There is mild to moderate right ventricular enlargement. Normal right ventricular systolic function. Qualitatively normal left ventricular systolic function. Trivial tricuspid valve insufficiency.   There is mild flow acceleration across the pulmonary valve with a peak gradient across the pulmonary valve of 22 mmHg. Moderate to severe (3-4+) pulmonary valve insufficiency. There is mild dilation of the aortic root at the level of the sinuses of Valsalva.        CPX 11/16/2023  Peak VO2 (ml/kg/min) VE/VCO2  [place] RER   17.90        30.54        1.16             Predicted VO2 (ml/kg/min) Predicted VO2 %   32.50        55             Resting Supine BP (mmHg) Resting Standing BP (mmHg) Final Stress BP (mmHg)   148/96        138/90        208/96          Resting Supine HR (bpm) Resting Standing HR (bpm)     72        80               Max HR " (bpm) Max Predicted HR (bpm) Max Perdicted HR %   167        186        90             Exercise time (min) Estimated workload (METS)   7        5.1              A cardiopulmonary stress test was performed following a Mac ramp protocol with the patient exercising for 7 minutes under the supervision of Dr. Becerril.  Heart rate demonstrated a normal response to exercise.  Blood pressure demonstrated a hypertensive response to exercise.    The stress electrocardiogram is negative for inducible ischemic EKG changes.    The patient's exercise capacity is severely impaired.    A prior study was conducted on 11/16/2022. When compared to the previous test, this represents an 11% decrease in functional capacity.     The patient achieved a severely impaired, class II functional capacity with a cardiac limitation to exercise. A cardiac limitation is suggested given the low peak VO2 and oxygen pulse as well as frequent ectopy, including a 6 beat run of ventricular tachycardia.     ECHO 5/23/2024: Moderate to severe pulmonary valve insufficiency. No pulmonary valve stenosis, peak gradient 12 mmHg. There is no residual ventricular level shunt. Trivial tricuspid and mitral valve insufficiency. There is mild to moderate right ventricular enlargement with normal right ventricular systolic function.    Normal left ventricular size and systolic function. The calculated single plane left ventricular ejection fraction from the 4 chamber view is 68%. There is dilation of the aortic root at the level of the sinuses of Valsalva. No aortic valve insufficiency.    CTA 6/26/2024        The left coronary arises from a posterior sinus and courses inferiorly to the distal MPA        RCA is clockwise rotated and arises from left kumari sinus. Has a very short interarterial course.              Assessment and Plan:     In summary, Brittani is a 33 year old with TOF sp 25 mm RV-PA homograft with increase in symptoms, abnormal CXT and mild  abnormal CMR. We discussed today in great detail her anatomy using a heart diagram and the most recent CTA.     I think Brittani Gaviria will benefit from cardiac catheterisation and TPVR given worsening in symptoms, abnormal CXR and altough not significant also abnormal RVEDV, RVEDV>x2 RVESV. I discussed with Previous in detail about the options including cardiac catheterisation and surgery. I explained to her the details of cardiac catheterization including the risks and benefits of the procedure. I showed her the CTA images and relayed my concern of her RCA being close to her conduit, the need to do compression tenting before moving forward with the TPVR. Her CTA has been sent also for [place] TPV 22 analysis which is pending.  She voiced understanding of the risks of the procedure and would like to go ahead with cardiac catheterisation which will be scheduled in 8/16/2024.     She is scheduled for dental caries extraction next week. Assuming her dentition are free from caries afterwards we will move forward with the TPVR for now. She should continue taking ASA.     Thank you for the opportunity to participate in the care of Brittani Gaviria . Please do not hesitate to call with questions or concerns.    Sincerely,    Johnathon Hernandez MD  Pediatric Cardiology      60 min spent on the date of the encounter in chart review, patient visit, review of tests, documentation and/or discussion with other providers about the issues documented above.       CC:    1. Leila Tay    2.  CC  Patient Care Team:  Leila Tay DO as PCP - Deyanira Gipson APRN CNP as Assigned Heart and Vascular Provider  Cogan, Jacob, MD as Physician (Hematology & Oncology)  Neil Wilkins MD as MD (Pediatric Cardiology)  JOHNATHON PALENCIA        [Note: Chart documentation done in part with Dragon Voice Recognition software. Although reviewed after completion, some word and grammatical errors may  remain.]

## 2024-07-19 ENCOUNTER — MYC MEDICAL ADVICE (OUTPATIENT)
Dept: CARDIOLOGY | Facility: CLINIC | Age: 34
End: 2024-07-19
Payer: COMMERCIAL

## 2024-07-19 NOTE — TELEPHONE ENCOUNTER
amoxicillin (AMOXIL) 500 MG capsule 12 capsule 3 12/11/2023       Last Office Visit: 6/11/24  Future Office visit:   12/10/24      Routing refill request to provider for review/approval because:  Drug not on the [facility], [facility] or [facility] refill protocol     Jessi Dodd RN  [facility] Red Flag Triage/MRT

## 2024-07-22 ENCOUNTER — TELEPHONE (OUTPATIENT)
Dept: CARDIOLOGY | Facility: CLINIC | Age: 34
End: 2024-07-22
Payer: COMMERCIAL

## 2024-07-22 DIAGNOSIS — Z95.4 S/P TRANSCATHETER REPLACEMENT OF PULMONARY VALVE: ICD-10-CM

## 2024-07-22 DIAGNOSIS — Q21.3 TETRALOGY OF FALLOT: ICD-10-CM

## 2024-07-22 DIAGNOSIS — Q24.9 ADULT CONGENITAL HEART DISEASE: Primary | ICD-10-CM

## 2024-07-22 DIAGNOSIS — I37.1 PULMONARY VALVE INSUFFICIENCY, UNSPECIFIED ETIOLOGY: ICD-10-CM

## 2024-07-22 NOTE — TELEPHONE ENCOUNTER
Date: 7/22/2024    Time of Call: 5:31 PM     Diagnosis:  s/[ TPVR     [ TORB ] Ordering provider: Olga Parks MD  Order: echo and appt 1 month post TPVR     Order received by: Sadie Hurd RN     Follow-up/additional notes: sent to scheduling

## 2024-07-23 RX ORDER — AMOXICILLIN 500 MG/1
CAPSULE ORAL
Qty: 12 CAPSULE | Refills: 3 | Status: SHIPPED | OUTPATIENT
Start: 2024-07-23

## 2024-07-31 ENCOUNTER — TRANSFERRED RECORDS (OUTPATIENT)
Dept: HEALTH INFORMATION MANAGEMENT | Facility: CLINIC | Age: 34
End: 2024-07-31
Payer: COMMERCIAL

## 2024-08-06 DIAGNOSIS — I10 HTN (HYPERTENSION): ICD-10-CM

## 2024-08-06 DIAGNOSIS — Q21.3 TETRALOGY OF FALLOT: ICD-10-CM

## 2024-08-08 ENCOUNTER — TELEPHONE (OUTPATIENT)
Dept: CARDIOLOGY | Facility: CLINIC | Age: 34
End: 2024-08-08
Payer: COMMERCIAL

## 2024-08-09 ENCOUNTER — TELEPHONE (OUTPATIENT)
Facility: CLINIC | Age: 34
End: 2024-08-09
Payer: COMMERCIAL

## 2024-08-09 NOTE — TELEPHONE ENCOUNTER
Contacted Brittani to discuss procedure scheduled on 8/16/2024.     The patient has not been ill. She denies, fever, runny nose, cough, vomiting, diarrhea, or rash.    Confirmed patient has been taking Aspirin 81 mg daily and told her to continue. Patient reports she had all her dental work completed and alerted the dentist she will be having this procedure done.      Discussed:  Arrival time: per PAN  NPO times: per PAN  History & Physical : Completed and in chart 7/18/24 with Dr. Hernandez   Medications: Take ASA morning of procedure and hold all other medications. No longer taking Diltiazem daily. Will follow up with patient on plan for Losartan after discussion with Dr. Hernandez and anesthesia team.   Required hcg testing for all females >10 years old discussed as applicable   No special soap is needed prior to the procedure   PAN will be calling the family as well     All patient's questions were answered. Encouraged patient to call us back with any questions or concerns prior to the procedure.

## 2024-08-10 NOTE — TELEPHONE ENCOUNTER
Medication Requested:  losartan (COZAAR) 50 MG tablet 90 tablet 3 8/31/2023 -- No   Sig - Route: Take 1 tablet (50 mg) by mouth daily - Oral     ----------------------  Last Office Visit : 6/11/2024  [facility] Office visit:    9/18/2024 9:45 AM (60 min)  Preet    Arrive by:  9:30 AM   [facility]   Rfl Status: Pending Review   CV ([facility])   Hallie Parks MD     ----------------------      Refill decision:   Drug interaction Warning:  Very High  Lactation Warning: losartan  Not recommended for Lactation  Patient lactation status is unknown    BP elevated >140/90, pended for 3 mos.     Pass/Fail Protocol Criteria:  Angiotensin-II Receptors Cvouew42/10/2024 09:42 AM   Protocol Details Last blood pressure under 140/90 in past 12 months     BP Readings from Last 3 Encounters:   07/18/24 (!) 147/101   07/01/24 131/78   06/26/24 (!) 139/97

## 2024-08-12 ENCOUNTER — ANESTHESIA EVENT (OUTPATIENT)
Dept: CARDIOLOGY | Facility: CLINIC | Age: 34
DRG: 267 | End: 2024-08-12
Payer: COMMERCIAL

## 2024-08-13 RX ORDER — LOSARTAN POTASSIUM 50 MG/1
50 TABLET ORAL DAILY
Qty: 90 TABLET | Refills: 3 | Status: SHIPPED | OUTPATIENT
Start: 2024-08-13

## 2024-08-14 ENCOUNTER — TELEPHONE (OUTPATIENT)
Dept: PEDIATRIC CARDIOLOGY | Facility: CLINIC | Age: 34
End: 2024-08-14
Payer: COMMERCIAL

## 2024-08-14 ENCOUNTER — MYC MEDICAL ADVICE (OUTPATIENT)
Dept: PEDIATRIC CARDIOLOGY | Facility: CLINIC | Age: 34
End: 2024-08-14
Payer: COMMERCIAL

## 2024-08-14 NOTE — TELEPHONE ENCOUNTER
LVM reaching out per Dr. Hernandez to reschedule TPVR scheduled on 8/16 due to PS out of office, I have new dates identified, please call back to review.

## 2024-08-16 ENCOUNTER — ANESTHESIA (OUTPATIENT)
Dept: CARDIOLOGY | Facility: CLINIC | Age: 34
DRG: 267 | End: 2024-08-16
Payer: COMMERCIAL

## 2024-08-24 ENCOUNTER — ALLIED HEALTH/NURSE VISIT (OUTPATIENT)
Dept: PHARMACY | Facility: CLINIC | Age: 34
End: 2024-08-24
Payer: COMMERCIAL

## 2024-08-24 VITALS — DIASTOLIC BLOOD PRESSURE: 92 MMHG | SYSTOLIC BLOOD PRESSURE: 165 MMHG

## 2024-08-24 DIAGNOSIS — Z01.30 BP CHECK: Primary | ICD-10-CM

## 2024-08-24 NOTE — PROGRESS NOTES
Brittani Gaviria was evaluated at [facility] on August 24, 2024 at which time her blood pressure was:    BP Readings from Last 1 Encounters:   08/23/24 (!) 165/92     No data recorded      Reviewed lifestyle modifications for blood pressure control and reduction: including making healthy food choices, managing weight, getting regular exercise, smoking cessation, reducing alcohol consumption, monitoring blood pressure regularly.     Symptoms: None    BP Goal:< 140/90 mmHg    BP Assessment:  BP too high    Potential Reasons for BP too high: NA - Not applicable    BP Follow-Up Plan: Referral to PCP    Recommendation to Provider: Will have patient f/unit(s) with  their PCP    Note completed by: Dee De Los Santos RPH

## 2024-09-03 ENCOUNTER — TRANSFERRED RECORDS (OUTPATIENT)
Dept: MULTI SPECIALTY CLINIC | Facility: CLINIC | Age: 34
End: 2024-09-03
Payer: COMMERCIAL

## 2024-09-03 LAB
CREATININE (EXTERNAL): 0.78 MG/DL (ref 0.55–1.02)
GFR ESTIMATED (EXTERNAL): >60 ML/MIN/1.73M2
POTASSIUM (EXTERNAL): 4.1 MMOL/L (ref 3.5–5.1)

## 2024-09-18 ENCOUNTER — TELEPHONE (OUTPATIENT)
Dept: CARDIOLOGY | Facility: CLINIC | Age: 34
End: 2024-09-18

## 2024-09-18 NOTE — TELEPHONE ENCOUNTER
Contacted patient at [phone] to discuss procedure scheduled on 9/23/24 at 0730.     The patient has not been ill. Family denies, fever, runny nose, cough, vomiting, diarrhea, or rash, including diaper. She notes that her children have been ill, but she continues to do well. Instructed her to call if she does become ill, she endorses understanding.     Discussed:  Arrival time: per PAN  NPO times: per PAN  History & Physical : Completed and in chart  Medications: Patient/family instructed to take aspirin with a small sip of water prior to procedure and to not take any other medications morning of procedure  Instructed to hold losartan the day before and day of surgery. Instructed to hold diltiazem and spironolactone the morning of the procedure  Required hcg testing for all females >10 years old discussed as applicable   No special soap is needed prior to the procedure   PAN will be calling the family as well     Discussed history of blood transfusion reaction. Recommend patient get a type and screen drawn on Friday, in order to ensure blood available on Monday for procedure. She endorses understanding, and can get the lab drawn on the [place] on Friday. Will call to check in on Friday.  Addendum: ABO/Type and Screen completed, called blood bank and they have all orders they need to prepare blood for Monday. Called and left voicemail to inform Brittani that everything has been completed.    All patient's questions were answered. Encouraged family to call us back at the Pediatric Cardiology department with any questions or concerns prior to the procedure.     Cathy Mcmillan PA-C (Jenna)  Pediatric Cardiology  [facility]

## 2024-09-18 NOTE — PROGRESS NOTES
Type and Screen and prepare 1 unit RBCs ordered due to history of clinically significant antibody against RBC antigens. Patient scheduled for surgery 9/23/24, will get type and screen drawn on Friday 9/20/24 in order to ensure blood available for procedure.     Cathy Mcmillan PA-C (Jenna)  Pediatric Cardiology  [facility]

## 2024-09-19 LAB
ABO/RH(D): ABNORMAL
ANTIBODY SCREEN: POSITIVE
SPECIMEN EXPIRATION DATE: ABNORMAL

## 2024-09-20 ENCOUNTER — APPOINTMENT (OUTPATIENT)
Dept: LAB | Facility: CLINIC | Age: 34
End: 2024-09-20
Payer: COMMERCIAL

## 2024-09-20 ENCOUNTER — LAB (OUTPATIENT)
Dept: LAB | Facility: CLINIC | Age: 34
End: 2024-09-20
Payer: COMMERCIAL

## 2024-09-20 DIAGNOSIS — Z01.818 PRE-OP TESTING: ICD-10-CM

## 2024-09-20 DIAGNOSIS — Q21.3 TETRALOGY OF FALLOT: ICD-10-CM

## 2024-09-20 DIAGNOSIS — Q24.9 ADULT CONGENITAL HEART DISEASE: ICD-10-CM

## 2024-09-20 DIAGNOSIS — Q24.9 ADULT CONGENITAL HEART DISEASE: Primary | ICD-10-CM

## 2024-09-20 LAB
ANTIBODY ID: NORMAL
BLD PROD TYP BPU: NORMAL
BLOOD COMPONENT TYPE: NORMAL
C AG RBC QL: NEGATIVE
CODING SYSTEM: NORMAL
CROSSMATCH: NORMAL
ISSUE DATE AND TIME: NORMAL
SPECIMEN EXPIRATION DATE: NORMAL
SPECIMEN EXPIRATION DATE: NORMAL
UNIT ABO/RH: NORMAL
UNIT NUMBER: NORMAL
UNIT STATUS: NORMAL
UNIT TYPE ISBT: 9500

## 2024-09-20 PROCEDURE — 86900 BLOOD TYPING SEROLOGIC ABO: CPT

## 2024-09-20 PROCEDURE — 36415 COLL VENOUS BLD VENIPUNCTURE: CPT | Performed by: PATHOLOGY

## 2024-09-20 PROCEDURE — 86870 RBC ANTIBODY IDENTIFICATION: CPT

## 2024-09-20 PROCEDURE — 86922 COMPATIBILITY TEST ANTIGLOB: CPT | Performed by: PHYSICIAN ASSISTANT

## 2024-09-20 PROCEDURE — 86905 BLOOD TYPING RBC ANTIGENS: CPT

## 2024-09-20 NOTE — BRIEF OP NOTE
"[facility]  BRIEF POST-PROCEDURE NOTE    Pre Cath CRISA category 3/4    Pre-procedure diagnosis Tetralogy of Fallot  S/P transannular patch (11 mo)  S/P \"valve repair\" (6 yo, [place])  S/P 25 mm RV-PA homograft (9 yo, [place])  Moderate-severe PI  RV enlargement  Palpitations from NSVT and SVT on diltiazem  HTN on losartan  Exercise intolerance, worsening SOB and energy levels   Post-procedure diagnosis same   Procedure ultrasound guided vascular access  right and retrograde left heart cath  angiography  Transcatheter pulmonary valve replacement with 26mm Myriam 3 valve  Perclose x2 of RFV, angioseal of RFA   Staff Dr. Dolly Hernandez   Assistant(s) Cathy Mcmillan PA-C   Anesthesia general anesthesia   Access 24F RFV, 5F RFA   Specimens N/A   IV contrast 262 mL   Heparinized Yes   Blood loss 10 mL   Complications None     Preliminary findings:    At baseline  Normal cardiac output of 6.97 L/min, normal PVR of 1.43 [place]. Qp:Qs 1.00:1.  Slightly elevated right sided filling pressure s(10-11), RV <1/2 systemic  Pulmonary insufficiency  Normal right and left coronary angiography. RCA is clockwise rotated in very close proximity to conduit.   Coronary compression testing did not demonstrate compression or significant distortion  Post-Intervention  Gradient of 8 mmHg from RV to MPA  Stable valve placement  No pulmonary insufficiency following valve placement  No coronary compression or distortion s/p 26mm Myriam valve.    Post-Intervention AP/Lateral          Plan:  Transfer to PACU and admit to [facility]  for post-procedure monitoring and recovery and cardiac monitoring  Bedrest for 4 hours  Monitor cath site for bleeding, swelling, redness, discharge, or change in color/temperature/sensation.  Abx: x24h  Imaging: CXR today and Echo tomorrow  ASA 81 mg daily for at least 6 months and SBE prophylaxis x6 months  VerifyNow in AM  Follow up with primary Cardiologist Dr. Wilkins in 1 month  If you have any " questions or concerns, please do not hesitate to page the cath lab MARIANA/Fellow between 7AM-5PM and cardiology fellow on call after 5 pm    GISELE Ugarte (Jenna)C  Pediatric Cardiology  [facility]

## 2024-09-22 ASSESSMENT — ENCOUNTER SYMPTOMS: DYSRHYTHMIAS: 1

## 2024-09-22 NOTE — ANESTHESIA PREPROCEDURE EVALUATION
Anesthesia Pre-Procedure Evaluation    Patient: Brittani Gaviria   MRN: [id number] : 1990        Procedure : Procedure(s):  Heart Catheterization, Transcatheter pulmonary valve replacement          Past Medical History:   Diagnosis Date    Carpal tunnel syndrome     right, conservative management     Chlamydia 2011    Cholelithiasis     resolved    Hyperparathyroidism (H24) 2016    Iron deficiency anemia     Kidney stones     Migraines     Other and unspecified ovarian cyst     Rh sensitization 2016    Tetralogy of Fallot     surgical correction      Past Surgical History:   Procedure Laterality Date    CARDIAC SURGERY      x 3 @ ages , 11 months, 13 years     SECTION      x 2    CHOLECYSTECTOMY, LAPOROSCOPIC  2014    Cholecystectomy, Laparoscopic    EP COMPREHENSIVE EP STUDY N/A 2024    Procedure: EP Comprehensive EP Study;  Surgeon: Casper Valdes MD;  Location:  HEART CARDIAC CATH LAB      Allergies   Allergen Reactions    Blood Transfusion Related (Informational Only) Other (See Comments)     Patient has a history of a clinically significant antibody against RBC antigens.  A delay in compatible RBCs may occur.    Ketorolac Hives, Itching and Unknown     Tolerates ibuprofen without reaction      Ferumoxytol Difficulty breathing, Cramps, Hives, Itching and Muscle Pain (Myalgia)      Social History     Tobacco Use    Smoking status: Never     Passive exposure: Never    Smokeless tobacco: Never   Substance Use Topics    Alcohol use: No     Alcohol/week: 0.0 standard drinks of alcohol      Wt Readings from Last 1 Encounters:   24 88.5 kg (195 lb 1.7 oz)        Anesthesia Evaluation   Pt has had prior anesthetic. Type: General.    No history of anesthetic complications       ROS/MED HX  ENT/Pulmonary:       Neurologic:     (+)      migraines,                          Cardiovascular: Comment:   Cardiac Diagnoses:  1. Tetrology of Fallot with 25 mm RV-PA conduit  2.  "Moderate-severe PI, 39% RF  3. RV enlargement, RVEDVi 112 ml/m2  4. Hypertension  5. Hx of NSVT and VT      Cardiac Surgeries:  1. Tetrology of Fallot        a. S/P transannular patch at 11 months of age       b. SP \"valve repair\" at age 7 yrs ([facility] in [place], [place])       c. S/P 25 mm RV-PA homograft  ([facility] in [place], [place] , Dr Yue Johnson and Dr. Johnson) at 10 yrs of age (7/31/2001) (surgical report scanned on 4/26/24)    EP study with no inducible VT, 7/1/2024    Chest MRA/cMRI:  11/2023  RDEVI 113ml/m2  cMRI  Normal biventricular function, LVEF 63% and RVEF 54%  Right ventricular end-diastolic volume index = 113 mL/m2, RVESV 51.7 mL/m2  Moderate pulmonary insufficiency (regurgitant volume 47 mL, regurgitant faction 39%).      (+)  hypertension- -   -  - -                        dysrhythmias (NSVT and VT), Other,  valvular problems/murmurs (Pulmonary insufficiency)   congenital heart disease (Tetralogy of Fallot)   Previous cardiac testing   Echo: Date: 5/23/24 Results:    Tetralogy of Fallot s/p surgical repair with transannular patch at 11 months  of age, s/p pulmonary valve repair at 7yrs of age, s/p pulmonary valve  replacement at 13yrs of age     Moderate to severe pulmonary valve insufficiency. No pulmonary valve stenosis,  peak gradient 12 mmHg. There is no residual ventricular level shunt. Trivial  tricuspid and mitral valve insufficiency. There is mild to moderate right  ventricular enlargement with normal right ventricular systolic function.  Normal left ventricular size and systolic function. The calculated single  plane left ventricular ejection fraction from the 4 chamber view is 68%. There  is dilation of the aortic root at the level of the sinuses of Valsalva. No  aortic valve insufficiency.    Stress Test:  Date: 11/16/23 Results:       A cardiopulmonary stress test was performed following a Mac ramp protocol with the patient exercising for 7 minutes under the " supervision of Dr. Becerril.  Heart rate demonstrated a normal response to exercise.  Blood pressure demonstrated a hypertensive response to exercise.     The stress electrocardiogram is negative for inducible ischemic EKG changes.     The patient's exercise capacity is severely impaired.     A prior study was conducted on 11/16/2022. When compared to the previous test, this represents an 11% decrease in functional capacity.     The patient achieved a severely impaired, class II functional capacity with a cardiac limitation to exercise. A cardiac limitation is suggested given the low peak VO2 and oxygen pulse as well as frequent ectopy, including a 6 beat run of ventricular tachycardia.    ECG Reviewed:  Date: 11/16/23 Results:    Baseline electrocardiogram demonstrates sinus rhythm and intraventricular conduction delay (RBBB). There were premature atrial contractions and premature ventricular contractions, .   The stress electrocardiogram is negative for inducible ischemic EKG changes. Arrhythmias during stress: Ventricular tachycardia at a rate of 200 bpm for 6 beats. Supraventricular tachycardia at a rate of 200 bpm for 4 beats. Frequent PACs. PVCs. Arrhythmias during recovery: Polymorphic PVCs.     Cath:  Date: Results:      METS/Exercise Tolerance:     Hematologic: Comments:   - History of RBC antibody      Musculoskeletal:       GI/Hepatic: Comment:   - s/p ventral hernia repair  - s/p cholecystectomy    (+) GERD (Takes omeprazole PRN; knows her triggers.), Other,                  Renal/Genitourinary:     (+)       Nephrolithiasis ,       Endo: Comment:   - Hyperparathyroidism sp resection 7/2018 at [facility] [place] [place]        Psychiatric/Substance Use:       Infectious Disease:       Malignancy:       Other:            Physical Exam    Airway  airway exam normal      Mallampati: I   TM distance: > 3 FB   Neck ROM: full   Mouth opening: > 3 cm    Respiratory Devices and Support         Dental       (+) Completely normal  "teeth      Cardiovascular   cardiovascular exam normal       Rhythm and rate: regular and normal   (+) murmur       Pulmonary   pulmonary exam normal        breath sounds clear to auscultation           OUTSIDE LABS:  CBC:   Lab Results   Component Value Date    WBC 7.8 07/01/2024    WBC 7.8 05/23/2024    HGB 13.5 07/01/2024    HGB 12.9 05/23/2024    HCT 42.4 07/01/2024    HCT 41.7 05/23/2024     07/01/2024     05/23/2024     BMP:   Lab Results   Component Value Date     07/01/2024     12/27/2023    POTASSIUM 4.0 07/01/2024    POTASSIUM 3.6 12/27/2023    CHLORIDE 108 (H) 07/01/2024    CHLORIDE 108 (H) 12/27/2023    CO2 25 07/01/2024    CO2 23 12/27/2023    BUN 8.6 07/01/2024    BUN 7.7 12/27/2023    CR 0.86 07/01/2024    CR 0.86 12/27/2023    GLC 77 07/01/2024    GLC 82 12/27/2023     COAGS: No results found for: \"PTT\", \"INR\", \"FIBR\"  POC: No results found for: \"BGM\", \"HCG\", \"HCGS\"  HEPATIC:   Lab Results   Component Value Date    ALBUMIN 4.4 12/27/2023    PROTTOTAL 8.2 12/27/2023    ALT 14 12/27/2023    AST 24 12/27/2023    ALKPHOS 44 12/27/2023    BILITOTAL 0.8 12/27/2023     OTHER:   Lab Results   Component Value Date    LISBET 10.2 (H) 07/01/2024    LIPASE 169 07/09/2015    AMYLASE 82 07/09/2015    TSH 1.27 05/06/2022       Anesthesia Plan    ASA Status:  3    NPO Status:  NPO Appropriate    Anesthesia Type: General.     - Airway: ETT   Induction: Intravenous.   Maintenance: Balanced.   Techniques and Equipment:     - Lines/Monitors: 2nd IV, Arterial Line, NIRS, BIS     - Blood: Blood in Room, PRBC     - Drips/Meds: Epinephrine     Consents    Anesthesia Plan(s) and associated risks, benefits, and realistic alternatives discussed. Questions answered and patient/representative(s) expressed understanding.     - Discussed:     - Discussed with:  Patient      - Extended Intubation/Ventilatory Support Discussed: No.      - Patient is DNR/DNI Status: No     Use of blood products discussed: No . "     Postoperative Care    Pain management: IV analgesics.   PONV prophylaxis: Ondansetron (or other 5HT-3), Dexamethasone or Solumedrol     Comments:    Other Comments: - Relevant risks, benefits, alternatives and the anesthetic plan were discussed with patient/family or family representative.  All questions were answered and there was agreement to proceed.             Mansi Franco MD    I have reviewed the pertinent notes and labs in the chart from the past 30 days and (re)examined the patient.  Any updates or changes from those notes are reflected in this note.

## 2024-09-23 ENCOUNTER — APPOINTMENT (OUTPATIENT)
Dept: GENERAL RADIOLOGY | Facility: CLINIC | Age: 34
DRG: 267 | End: 2024-09-23
Attending: PHYSICIAN ASSISTANT
Payer: COMMERCIAL

## 2024-09-23 ENCOUNTER — HOSPITAL ENCOUNTER (INPATIENT)
Facility: CLINIC | Age: 34
LOS: 1 days | Discharge: HOME | DRG: 267 | End: 2024-09-24
Attending: PEDIATRICS | Admitting: PEDIATRICS
Payer: COMMERCIAL

## 2024-09-23 DIAGNOSIS — Q21.3 TETRALOGY OF FALLOT: ICD-10-CM

## 2024-09-23 DIAGNOSIS — Q24.9 ADULT CONGENITAL HEART DISEASE: Primary | ICD-10-CM

## 2024-09-23 DIAGNOSIS — I45.10 RBBB: ICD-10-CM

## 2024-09-23 LAB
ACT BLD: 207 SECONDS (ref 74–150)
ACT BLD: 207 SECONDS (ref 74–150)
ACT BLD: 211 SECONDS (ref 74–150)
ACT BLD: 211 SECONDS (ref 74–150)
ACT BLD: 216 SECONDS (ref 74–150)
ACT BLD: 220 SECONDS (ref 74–150)
ACT BLD: 232 SECONDS (ref 74–150)
ACT BLD: 236 SECONDS (ref 74–150)
ACT BLD: NORMAL S
ALBUMIN SERPL BCG-MCNC: 3.9 G/DL (ref 3.5–5.2)
ALP SERPL-CCNC: 40 U/L (ref 40–150)
ALT SERPL W P-5'-P-CCNC: 18 U/L (ref 0–50)
ANION GAP SERPL CALCULATED.3IONS-SCNC: 12 MMOL/L (ref 7–15)
AST SERPL W P-5'-P-CCNC: 19 U/L (ref 0–45)
BASE EXCESS BLDA CALC-SCNC: -1.1 MMOL/L (ref -3–3)
BASE EXCESS BLDA CALC-SCNC: -2.6 MMOL/L (ref -3–3)
BASE EXCESS BLDA CALC-SCNC: -3.2 MMOL/L (ref -3–3)
BILIRUB SERPL-MCNC: 1.2 MG/DL
BUN SERPL-MCNC: 9.5 MG/DL (ref 6–20)
CA-I BLD-MCNC: 4.4 MG/DL (ref 4.4–5.2)
CA-I BLD-MCNC: 5 MG/DL (ref 4.4–5.2)
CA-I BLD-MCNC: 5.3 MG/DL (ref 4.4–5.2)
CALCIUM SERPL-MCNC: 10.4 MG/DL (ref 8.8–10.4)
CHLORIDE SERPL-SCNC: 105 MMOL/L (ref 98–107)
CREAT SERPL-MCNC: 0.99 MG/DL (ref 0.51–0.95)
EGFRCR SERPLBLD CKD-EPI 2021: 76 ML/MIN/1.73M2
GLUCOSE BLD-MCNC: 84 MG/DL (ref 70–99)
GLUCOSE BLD-MCNC: 86 MG/DL (ref 70–99)
GLUCOSE BLD-MCNC: 88 MG/DL (ref 70–99)
GLUCOSE BLDC GLUCOMTR-MCNC: 101 MG/DL (ref 70–99)
GLUCOSE BLDC GLUCOMTR-MCNC: 116 MG/DL (ref 70–99)
GLUCOSE SERPL-MCNC: 135 MG/DL (ref 70–99)
HCG UR QL: NEGATIVE
HCO3 BLDA-SCNC: 20 MMOL/L (ref 21–28)
HCO3 BLDA-SCNC: 22 MMOL/L (ref 21–28)
HCO3 BLDA-SCNC: 24 MMOL/L (ref 21–28)
HCO3 SERPL-SCNC: 23 MMOL/L (ref 22–29)
HGB BLD-MCNC: 11.3 G/DL (ref 11.7–15.7)
HGB BLD-MCNC: 12.1 G/DL (ref 11.7–15.7)
HGB BLD-MCNC: 13.2 G/DL (ref 11.7–15.7)
HOLD SPECIMEN: NORMAL
LACTATE BLD-SCNC: 0.4 MMOL/L (ref 0.7–2)
LACTATE BLD-SCNC: 0.4 MMOL/L (ref 0.7–2)
LACTATE BLD-SCNC: 0.5 MMOL/L (ref 0.7–2)
O2/TOTAL GAS SETTING VFR VENT: 21 %
O2/TOTAL GAS SETTING VFR VENT: 45 %
O2/TOTAL GAS SETTING VFR VENT: 60 %
OXYHGB MFR BLDA: 94 % (ref 92–100)
OXYHGB MFR BLDA: 98 % (ref 92–100)
OXYHGB MFR BLDA: 98 % (ref 92–100)
PCO2 BLDA: 31 MM HG (ref 35–45)
PCO2 BLDA: 37 MM HG (ref 35–45)
PCO2 BLDA: 40 MM HG (ref 35–45)
PH BLDA: 7.38 [PH] (ref 7.35–7.45)
PH BLDA: 7.38 [PH] (ref 7.35–7.45)
PH BLDA: 7.43 [PH] (ref 7.35–7.45)
PO2 BLDA: 257 MM HG (ref 80–105)
PO2 BLDA: 277 MM HG (ref 80–105)
PO2 BLDA: 73 MM HG (ref 80–105)
POTASSIUM BLD-SCNC: 2.9 MMOL/L (ref 3.4–5.3)
POTASSIUM BLD-SCNC: 3.1 MMOL/L (ref 3.4–5.3)
POTASSIUM BLD-SCNC: 3.2 MMOL/L (ref 3.4–5.3)
POTASSIUM SERPL-SCNC: 4 MMOL/L (ref 3.4–5.3)
PROT SERPL-MCNC: 7.5 G/DL (ref 6.4–8.3)
SAO2 % BLDA: 100 % (ref 96–97)
SAO2 % BLDA: 100 % (ref 96–97)
SAO2 % BLDA: 96 % (ref 96–97)
SODIUM BLD-SCNC: 144 MMOL/L (ref 135–145)
SODIUM BLD-SCNC: 144 MMOL/L (ref 135–145)
SODIUM BLD-SCNC: 145 MMOL/L (ref 135–145)
SODIUM SERPL-SCNC: 140 MMOL/L (ref 135–145)

## 2024-09-23 PROCEDURE — 84132 ASSAY OF SERUM POTASSIUM: CPT | Performed by: PEDIATRICS

## 2024-09-23 PROCEDURE — 250N000025 HC SEVOFLURANE, PER MIN: Performed by: PEDIATRICS

## 2024-09-23 PROCEDURE — 93010 ELECTROCARDIOGRAM REPORT: CPT | Mod: XU | Performed by: INTERNAL MEDICINE

## 2024-09-23 PROCEDURE — 250N000009 HC RX 250: Performed by: NURSE ANESTHETIST, CERTIFIED REGISTERED

## 2024-09-23 PROCEDURE — 93596 R&L HRT CATH CHD NML NT CNJ: CPT | Performed by: NURSE ANESTHETIST, CERTIFIED REGISTERED

## 2024-09-23 PROCEDURE — 250N000013 HC RX MED GY IP 250 OP 250 PS 637

## 2024-09-23 PROCEDURE — 93569 NJX CTH SLCT P-ART ANGRP UNI: CPT | Performed by: PEDIATRICS

## 2024-09-23 PROCEDURE — 250N000011 HC RX IP 250 OP 636: Performed by: PEDIATRICS

## 2024-09-23 PROCEDURE — 250N000009 HC RX 250: Performed by: PEDIATRICS

## 2024-09-23 PROCEDURE — 370N000017 HC ANESTHESIA TECHNICAL FEE, PER MIN: Performed by: PEDIATRICS

## 2024-09-23 PROCEDURE — 99221 1ST HOSP IP/OBS SF/LOW 40: CPT | Performed by: INTERNAL MEDICINE

## 2024-09-23 PROCEDURE — 258N000003 HC RX IP 258 OP 636: Performed by: NURSE ANESTHETIST, CERTIFIED REGISTERED

## 2024-09-23 PROCEDURE — 250N000024 HC ISOFLURANE, PER MIN: Performed by: PEDIATRICS

## 2024-09-23 PROCEDURE — B31T1ZZ FLUOROSCOPY OF LEFT PULMONARY ARTERY USING LOW OSMOLAR CONTRAST: ICD-10-PCS | Performed by: PEDIATRICS

## 2024-09-23 PROCEDURE — 94681 O2 UPTK CO2 OUTP % O2 XTRC: CPT | Performed by: PEDIATRICS

## 2024-09-23 PROCEDURE — C1769 GUIDE WIRE: HCPCS | Performed by: PEDIATRICS

## 2024-09-23 PROCEDURE — 93005 ELECTROCARDIOGRAM TRACING: CPT

## 2024-09-23 PROCEDURE — C1889 IMPLANT/INSERT DEVICE, NOC: HCPCS | Performed by: PEDIATRICS

## 2024-09-23 PROCEDURE — 250N000011 HC RX IP 250 OP 636: Performed by: PHYSICIAN ASSISTANT

## 2024-09-23 PROCEDURE — C1894 INTRO/SHEATH, NON-LASER: HCPCS | Performed by: PEDIATRICS

## 2024-09-23 PROCEDURE — C1725 CATH, TRANSLUMIN NON-LASER: HCPCS | Performed by: PEDIATRICS

## 2024-09-23 PROCEDURE — 81025 URINE PREGNANCY TEST: CPT | Performed by: PHYSICIAN ASSISTANT

## 2024-09-23 PROCEDURE — 71045 X-RAY EXAM CHEST 1 VIEW: CPT | Mod: 26 | Performed by: RADIOLOGY

## 2024-09-23 PROCEDURE — 93596 R&L HRT CATH CHD NML NT CNJ: CPT | Performed by: ANESTHESIOLOGY

## 2024-09-23 PROCEDURE — 85347 COAGULATION TIME ACTIVATED: CPT

## 2024-09-23 PROCEDURE — 93566 NJX CAR CTH SLCTV RV/RA ANG: CPT | Performed by: PEDIATRICS

## 2024-09-23 PROCEDURE — B2151ZZ FLUOROSCOPY OF LEFT HEART USING LOW OSMOLAR CONTRAST: ICD-10-PCS | Performed by: PEDIATRICS

## 2024-09-23 PROCEDURE — 272N000001 HC OR GENERAL SUPPLY STERILE: Performed by: PEDIATRICS

## 2024-09-23 PROCEDURE — 255N000002 HC RX 255 OP 636: Performed by: PEDIATRICS

## 2024-09-23 PROCEDURE — 200N000002 HC R&B ICU UMMC

## 2024-09-23 PROCEDURE — 250N000011 HC RX IP 250 OP 636: Performed by: NURSE ANESTHETIST, CERTIFIED REGISTERED

## 2024-09-23 PROCEDURE — 33477 IMPLANT TCAT PULM VLV PERQ: CPT | Performed by: PEDIATRICS

## 2024-09-23 PROCEDURE — 93567 NJX CAR CTH SPRVLV AORTGRPHY: CPT | Performed by: PEDIATRICS

## 2024-09-23 PROCEDURE — C1893 INTRO/SHEATH, FIXED,NON-PEEL: HCPCS | Performed by: PEDIATRICS

## 2024-09-23 PROCEDURE — P9016 RBC LEUKOCYTES REDUCED: HCPCS | Performed by: PHYSICIAN ASSISTANT

## 2024-09-23 PROCEDURE — 4A023N8 MEASUREMENT OF CARDIAC SAMPLING AND PRESSURE, BILATERAL, PERCUTANEOUS APPROACH: ICD-10-PCS | Performed by: PEDIATRICS

## 2024-09-23 PROCEDURE — 02RH38L REPLACEMENT OF PULMONARY VALVE WITH ZOOPLASTIC TISSUE, IN EXISTING CONDUIT, PERCUTANEOUS APPROACH: ICD-10-PCS | Performed by: PEDIATRICS

## 2024-09-23 PROCEDURE — 82330 ASSAY OF CALCIUM: CPT

## 2024-09-23 PROCEDURE — C1887 CATHETER, GUIDING: HCPCS | Performed by: PEDIATRICS

## 2024-09-23 PROCEDURE — 71045 X-RAY EXAM CHEST 1 VIEW: CPT

## 2024-09-23 PROCEDURE — 36415 COLL VENOUS BLD VENIPUNCTURE: CPT | Performed by: PEDIATRICS

## 2024-09-23 PROCEDURE — 99238 HOSP IP/OBS DSCHRG MGMT 30/<: CPT | Performed by: PHYSICIAN ASSISTANT

## 2024-09-23 PROCEDURE — 93597 R&L HRT CATH CHD ABNL NT CNJ: CPT | Performed by: PEDIATRICS

## 2024-09-23 PROCEDURE — 93563 NJX CGEN CAR CTH SLCTV C ANG: CPT | Performed by: PEDIATRICS

## 2024-09-23 PROCEDURE — B2111ZZ FLUOROSCOPY OF MULTIPLE CORONARY ARTERIES USING LOW OSMOLAR CONTRAST: ICD-10-PCS | Performed by: PEDIATRICS

## 2024-09-23 PROCEDURE — C1760 CLOSURE DEV, VASC: HCPCS | Performed by: PEDIATRICS

## 2024-09-23 DEVICE — VALVE HEART SAPIEN 3 26MM 9600TFX26A: Type: IMPLANTABLE DEVICE | Status: FUNCTIONAL

## 2024-09-23 DEVICE — CLOSURE ANGIOSEAL 6FR 610130: Type: IMPLANTABLE DEVICE | Status: FUNCTIONAL

## 2024-09-23 RX ORDER — ACETAMINOPHEN 650 MG/1
650 SUPPOSITORY RECTAL EVERY 4 HOURS PRN
Status: DISCONTINUED | OUTPATIENT
Start: 2024-09-23 | End: 2024-09-24 | Stop reason: HOSPADM

## 2024-09-23 RX ORDER — NICOTINE POLACRILEX 4 MG
15-30 LOZENGE BUCCAL
Status: DISCONTINUED | OUTPATIENT
Start: 2024-09-23 | End: 2024-09-24 | Stop reason: HOSPADM

## 2024-09-23 RX ORDER — CEFAZOLIN SODIUM/WATER 2 G/20 ML
2 SYRINGE (ML) INTRAVENOUS SEE ADMIN INSTRUCTIONS
Status: DISCONTINUED | OUTPATIENT
Start: 2024-09-23 | End: 2024-09-23 | Stop reason: HOSPADM

## 2024-09-23 RX ORDER — LOSARTAN POTASSIUM 25 MG/1
50 TABLET ORAL DAILY
Status: DISCONTINUED | OUTPATIENT
Start: 2024-09-24 | End: 2024-09-24 | Stop reason: HOSPADM

## 2024-09-23 RX ORDER — PROPOFOL 10 MG/ML
INJECTION, EMULSION INTRAVENOUS PRN
Status: DISCONTINUED | OUTPATIENT
Start: 2024-09-23 | End: 2024-09-23

## 2024-09-23 RX ORDER — ONDANSETRON 2 MG/ML
INJECTION INTRAMUSCULAR; INTRAVENOUS PRN
Status: DISCONTINUED | OUTPATIENT
Start: 2024-09-23 | End: 2024-09-23

## 2024-09-23 RX ORDER — SODIUM CHLORIDE, SODIUM LACTATE, POTASSIUM CHLORIDE, CALCIUM CHLORIDE 600; 310; 30; 20 MG/100ML; MG/100ML; MG/100ML; MG/100ML
INJECTION, SOLUTION INTRAVENOUS CONTINUOUS PRN
Status: DISCONTINUED | OUTPATIENT
Start: 2024-09-23 | End: 2024-09-23

## 2024-09-23 RX ORDER — LIDOCAINE 40 MG/G
CREAM TOPICAL
Status: DISCONTINUED | OUTPATIENT
Start: 2024-09-23 | End: 2024-09-23 | Stop reason: HOSPADM

## 2024-09-23 RX ORDER — ACETAMINOPHEN 325 MG/1
650 TABLET ORAL EVERY 4 HOURS PRN
Status: DISCONTINUED | OUTPATIENT
Start: 2024-09-23 | End: 2024-09-24 | Stop reason: HOSPADM

## 2024-09-23 RX ORDER — FENTANYL CITRATE 50 UG/ML
25 INJECTION, SOLUTION INTRAMUSCULAR; INTRAVENOUS EVERY 5 MIN PRN
Status: DISCONTINUED | OUTPATIENT
Start: 2024-09-23 | End: 2024-09-23 | Stop reason: HOSPADM

## 2024-09-23 RX ORDER — NALOXONE HYDROCHLORIDE 0.4 MG/ML
0.1 INJECTION, SOLUTION INTRAMUSCULAR; INTRAVENOUS; SUBCUTANEOUS
Status: DISCONTINUED | OUTPATIENT
Start: 2024-09-23 | End: 2024-09-23 | Stop reason: HOSPADM

## 2024-09-23 RX ORDER — IODIXANOL 320 MG/ML
INJECTION, SOLUTION INTRAVASCULAR
Status: DISCONTINUED | OUTPATIENT
Start: 2024-09-23 | End: 2024-09-23 | Stop reason: HOSPADM

## 2024-09-23 RX ORDER — CEFAZOLIN SODIUM 1 G/3ML
1000 INJECTION, POWDER, FOR SOLUTION INTRAMUSCULAR; INTRAVENOUS EVERY 8 HOURS
Status: COMPLETED | OUTPATIENT
Start: 2024-09-23 | End: 2024-09-24

## 2024-09-23 RX ORDER — SPIRONOLACTONE 25 MG/1
25 TABLET ORAL DAILY
Status: DISCONTINUED | OUTPATIENT
Start: 2024-09-24 | End: 2024-09-24 | Stop reason: HOSPADM

## 2024-09-23 RX ORDER — CEFAZOLIN SODIUM/WATER 2 G/20 ML
2 SYRINGE (ML) INTRAVENOUS
Status: COMPLETED | OUTPATIENT
Start: 2024-09-23 | End: 2024-09-23

## 2024-09-23 RX ORDER — HEPARIN SODIUM 1000 [USP'U]/ML
INJECTION, SOLUTION INTRAVENOUS; SUBCUTANEOUS PRN
Status: DISCONTINUED | OUTPATIENT
Start: 2024-09-23 | End: 2024-09-23

## 2024-09-23 RX ORDER — LIDOCAINE HYDROCHLORIDE 20 MG/ML
INJECTION, SOLUTION INFILTRATION; PERINEURAL PRN
Status: DISCONTINUED | OUTPATIENT
Start: 2024-09-23 | End: 2024-09-23

## 2024-09-23 RX ORDER — DILTIAZEM HYDROCHLORIDE 120 MG/1
120 CAPSULE, COATED, EXTENDED RELEASE ORAL DAILY
Status: DISCONTINUED | OUTPATIENT
Start: 2024-09-24 | End: 2024-09-23

## 2024-09-23 RX ORDER — ASPIRIN 81 MG/1
81 TABLET ORAL DAILY
Status: DISCONTINUED | OUTPATIENT
Start: 2024-09-24 | End: 2024-09-24 | Stop reason: HOSPADM

## 2024-09-23 RX ORDER — ACETAMINOPHEN 325 MG/1
975 TABLET ORAL ONCE
Status: DISCONTINUED | OUTPATIENT
Start: 2024-09-23 | End: 2024-09-23 | Stop reason: HOSPADM

## 2024-09-23 RX ORDER — DEXTROSE MONOHYDRATE 25 G/50ML
25-50 INJECTION, SOLUTION INTRAVENOUS
Status: DISCONTINUED | OUTPATIENT
Start: 2024-09-23 | End: 2024-09-24 | Stop reason: HOSPADM

## 2024-09-23 RX ORDER — SODIUM CHLORIDE, SODIUM LACTATE, POTASSIUM CHLORIDE, CALCIUM CHLORIDE 600; 310; 30; 20 MG/100ML; MG/100ML; MG/100ML; MG/100ML
INJECTION, SOLUTION INTRAVENOUS CONTINUOUS
Status: DISCONTINUED | OUTPATIENT
Start: 2024-09-23 | End: 2024-09-23 | Stop reason: HOSPADM

## 2024-09-23 RX ORDER — DEXAMETHASONE SODIUM PHOSPHATE 4 MG/ML
INJECTION, SOLUTION INTRA-ARTICULAR; INTRALESIONAL; INTRAMUSCULAR; INTRAVENOUS; SOFT TISSUE PRN
Status: DISCONTINUED | OUTPATIENT
Start: 2024-09-23 | End: 2024-09-23

## 2024-09-23 RX ORDER — CALCIUM CHLORIDE 100 MG/ML
INJECTION INTRAVENOUS; INTRAVENTRICULAR PRN
Status: DISCONTINUED | OUTPATIENT
Start: 2024-09-23 | End: 2024-09-23

## 2024-09-23 RX ORDER — FENTANYL CITRATE 50 UG/ML
INJECTION, SOLUTION INTRAMUSCULAR; INTRAVENOUS PRN
Status: DISCONTINUED | OUTPATIENT
Start: 2024-09-23 | End: 2024-09-23

## 2024-09-23 RX ORDER — BUPIVACAINE HYDROCHLORIDE 2.5 MG/ML
INJECTION, SOLUTION EPIDURAL; INFILTRATION; INTRACAUDAL
Status: DISCONTINUED | OUTPATIENT
Start: 2024-09-23 | End: 2024-09-23 | Stop reason: HOSPADM

## 2024-09-23 RX ADMIN — PROPOFOL 100 MG: 10 INJECTION, EMULSION INTRAVENOUS at 07:50

## 2024-09-23 RX ADMIN — HEPARIN SODIUM 2000 UNITS: 1000 INJECTION INTRAVENOUS; SUBCUTANEOUS at 09:29

## 2024-09-23 RX ADMIN — CEFAZOLIN 1000 MG: 1 INJECTION, POWDER, FOR SOLUTION INTRAMUSCULAR; INTRAVENOUS at 13:09

## 2024-09-23 RX ADMIN — DEXMEDETOMIDINE HYDROCHLORIDE 8 MCG: 100 INJECTION, SOLUTION INTRAVENOUS at 12:12

## 2024-09-23 RX ADMIN — HEPARIN SODIUM 2000 UNITS: 1000 INJECTION INTRAVENOUS; SUBCUTANEOUS at 11:07

## 2024-09-23 RX ADMIN — FENTANYL CITRATE 25 MCG: 50 INJECTION INTRAMUSCULAR; INTRAVENOUS at 10:31

## 2024-09-23 RX ADMIN — CALCIUM CHLORIDE INJECTION 200 MG: 100 INJECTION, SOLUTION INTRAVENOUS at 11:11

## 2024-09-23 RX ADMIN — PROPOFOL 50 MG: 10 INJECTION, EMULSION INTRAVENOUS at 08:55

## 2024-09-23 RX ADMIN — ONDANSETRON 4 MG: 2 INJECTION INTRAMUSCULAR; INTRAVENOUS at 12:06

## 2024-09-23 RX ADMIN — ROCURONIUM BROMIDE 10 MG: 10 INJECTION, SOLUTION INTRAVENOUS at 10:31

## 2024-09-23 RX ADMIN — ROCURONIUM BROMIDE 10 MG: 10 INJECTION, SOLUTION INTRAVENOUS at 11:25

## 2024-09-23 RX ADMIN — SODIUM CHLORIDE, SODIUM LACTATE, POTASSIUM CHLORIDE, CALCIUM CHLORIDE: 600; 310; 30; 20 INJECTION, SOLUTION INTRAVENOUS at 08:05

## 2024-09-23 RX ADMIN — LIDOCAINE HYDROCHLORIDE 80 MG: 20 INJECTION, SOLUTION INFILTRATION; PERINEURAL at 07:50

## 2024-09-23 RX ADMIN — ROCURONIUM BROMIDE 50 MG: 10 INJECTION, SOLUTION INTRAVENOUS at 07:50

## 2024-09-23 RX ADMIN — ROCURONIUM BROMIDE 10 MG: 10 INJECTION, SOLUTION INTRAVENOUS at 09:29

## 2024-09-23 RX ADMIN — FENTANYL CITRATE 100 MCG: 50 INJECTION INTRAMUSCULAR; INTRAVENOUS at 07:49

## 2024-09-23 RX ADMIN — HEPARIN SODIUM 6000 UNITS: 1000 INJECTION INTRAVENOUS; SUBCUTANEOUS at 08:45

## 2024-09-23 RX ADMIN — ROCURONIUM BROMIDE 10 MG: 10 INJECTION, SOLUTION INTRAVENOUS at 11:05

## 2024-09-23 RX ADMIN — ROCURONIUM BROMIDE 10 MG: 10 INJECTION, SOLUTION INTRAVENOUS at 08:58

## 2024-09-23 RX ADMIN — CEFAZOLIN 1000 MG: 1 INJECTION, POWDER, FOR SOLUTION INTRAMUSCULAR; INTRAVENOUS at 21:00

## 2024-09-23 RX ADMIN — FENTANYL CITRATE 25 MCG: 50 INJECTION INTRAMUSCULAR; INTRAVENOUS at 08:57

## 2024-09-23 RX ADMIN — CALCIUM CHLORIDE INJECTION 200 MG: 100 INJECTION, SOLUTION INTRAVENOUS at 11:15

## 2024-09-23 RX ADMIN — ROCURONIUM BROMIDE 10 MG: 10 INJECTION, SOLUTION INTRAVENOUS at 09:47

## 2024-09-23 RX ADMIN — Medication 2 G: at 08:10

## 2024-09-23 RX ADMIN — MIDAZOLAM 2 MG: 1 INJECTION INTRAMUSCULAR; INTRAVENOUS at 07:43

## 2024-09-23 RX ADMIN — SODIUM CHLORIDE, POTASSIUM CHLORIDE, SODIUM LACTATE AND CALCIUM CHLORIDE: 600; 310; 30; 20 INJECTION, SOLUTION INTRAVENOUS at 07:43

## 2024-09-23 RX ADMIN — ACETAMINOPHEN 650 MG: 325 TABLET ORAL at 20:57

## 2024-09-23 RX ADMIN — DEXMEDETOMIDINE HYDROCHLORIDE 12 MCG: 100 INJECTION, SOLUTION INTRAVENOUS at 12:15

## 2024-09-23 RX ADMIN — SUGAMMADEX 200 MG: 100 INJECTION, SOLUTION INTRAVENOUS at 12:38

## 2024-09-23 RX ADMIN — PROPOFOL 50 MG: 10 INJECTION, EMULSION INTRAVENOUS at 08:54

## 2024-09-23 RX ADMIN — DEXAMETHASONE SODIUM PHOSPHATE 6 MG: 4 INJECTION, SOLUTION INTRAMUSCULAR; INTRAVENOUS at 08:59

## 2024-09-23 ASSESSMENT — ACTIVITIES OF DAILY LIVING (ADL)
ADLS_ACUITY_SCORE: 36
ADLS_ACUITY_SCORE: 35
ADLS_ACUITY_SCORE: 32
ADLS_ACUITY_SCORE: 35
ADLS_ACUITY_SCORE: 31
ADLS_ACUITY_SCORE: 35
ADLS_ACUITY_SCORE: 36
ADLS_ACUITY_SCORE: 35
ADLS_ACUITY_SCORE: 36
ADLS_ACUITY_SCORE: 35
ADLS_ACUITY_SCORE: 31
ADLS_ACUITY_SCORE: 35
ADLS_ACUITY_SCORE: 36
ADLS_ACUITY_SCORE: 32
ADLS_ACUITY_SCORE: 36
ADLS_ACUITY_SCORE: 35

## 2024-09-23 NOTE — Clinical Note
6 Fr ProGlide utilized for closure of site.  Manual pressure applied by scrub person; hemostasis achieved.

## 2024-09-23 NOTE — Clinical Note
The first balloon was inserted into the pulmonary artery.Max pressure = 6 mikaela. Total duration = 20 seconds.

## 2024-09-23 NOTE — PLAN OF CARE
Admitted/transferred from: PACU at 1430  Reason for admission/transfer: Post-op monitoring for transcatheter pulmonary valve replacement.   2 RN skin assessment: completed by Loren & JOSSIE Dial  Result of skin assessment and interventions/actions: bruising around catheter insertion, IV site, no major skin issue noted, will monitor   Height, weight, drug calc weight: Done  Patient belongings (see Flowsheet)  MDRO education added to care planYes    ICU End of Shift (0430 to 1930) Summary. See flowsheets for vital signs and detailed assessment.      Major Events this shift: Patient arrived on the unit, nurse to nurse hand of and assessment of procedure site completed, patient oriented, initial admission assessment completed and post op assessment continued per order. Patient then got up to use the bathroom and right away monitor started reading arrhythmia VTach while rate was below 100. Patient denied of any shortness of breath, chest pain or any other symptoms other than a brief physical pain in the affected leg. MARIANA notified, EKG and CXR completed without acute finding. Cardiology contacted by MARIANA, no major orders but to monitor vitals and patient symptoms and advise as needed.         Neuro: A&Ox4  CV: Normal sinus rhythm with PVSs.   PU: RA  GI: Regular diet, ordered dinner and ate without any nausea/vomiting or any other discomfort.    : Continent, up to bedside commode and spontaneously voiding   Skin:  R thing bruising and procedure site redness and pain on palpation.   Pain: Denies of significant pain and declined pain medications.     Lines: R PIV patent.       Plan: Echo tomorrow, labs this evening, monitor heart rate, rhythm and symptoms and report to provider as needed,  monitor patient procedure site as per current POC.      Goal Outcome Evaluation:      Plan of Care Reviewed With: patient    Overall Patient Progress: improvingOverall Patient Progress: improving

## 2024-09-23 NOTE — ANESTHESIA PROCEDURE NOTES
Airway       Patient location during procedure: OR       Procedure Start/Stop Times: 9/23/2024 7:53 AM  Staff -        CRNA: Darlin Duarte APRN CRNA       Performed By: CRNA  Consent for Airway        Urgency: elective  Indications and Patient Condition       Indications for airway management: devon-procedural       Induction type:intravenous       Mask difficulty assessment: 1 - vent by mask    Final Airway Details       Final airway type: endotracheal airway       Successful airway: ETT - single  Endotracheal Airway Details        ETT size (mm): 7.0       Successful intubation technique: direct laryngoscopy       DL Blade Type: Dee 2       Grade View of Cords: 3 (only base of arytenoids seen)       Adjucts: stylet       Position: Right       Measured from: gums/teeth       Secured at (cm): 22       Bite block used: None    Post intubation assessment        Placement verified by: capnometry, equal breath sounds and chest rise        Number of attempts at approach: 1       Secured with: tape       Ease of procedure: easy       Dentition: Intact and Unchanged    Medication(s) Administered   Medication Administration Time: 9/23/2024 7:53 AM

## 2024-09-23 NOTE — CONSULTS
[facility]  Critical Care Service  Consult Note  Date of Service (when I saw the patient): 09/23/2024  Main Plans for Today    Admit to ICU post-op  Bedrest x2 hours  Assessment & Plan   Brittani SERRANO Everette is a 34 year old female with PMH significant for TOF s/p surgical repair at 11 months, s/p pulmonary valve repair at 6yo, PVR at 14yo who was admitted to the ICU on 9/23/2024 post-operatively following a transcatheter pulmonary valve replacement.    Neuro:  No acute issues  - Monitor neurological status    CV:  # S/P Transcatheter pulmonary valve replacement  # Hx TOF s/p repair  # Palpitations from NSVT and SVT  # Hypertension   # RV enlargement  # Moderate to severe PI  Plan per pediatric CV surgery:  CXR today  ECHO tomorrow  ASA 81mg starting tomorrow   Bedrest x2 hours  Continue PTA diltiazem, losartan and spironolactone tomorrow  -- Continuous cardiac monitoring  -- Alert CV surgery team if any arrhythmias or chest pain    Respiratory:  # Shortness of breath  Patient had been noticing increasing SOB, exercise intolerance and decreased energy levels, therefore she was worked up to undergo a transcatheter pulmonary valve replacement.  -- Denies SOB post-op  -- Oxygen saturations >95% on room air    GI/Nutrition:  No acute issues  Plan:  -- Advance diet as tolerated    Renal:  No acute issues  -- monitor function and electrolytes as needed with replacement per ICU protocols.   -- follow I/O's as appropriate.  -- maintain euvolemia    ID:  # Meg-operative abx  - Cefazolin 1g Q8H x3 doses  - Monitor for s/sx infection    Endocrine:  No acute issues  -- Keep BG  <180 for optimal healing    Heme:  No acute issues    MSK:  No acute issues    Skin:  Right groin angioseal  - Monitor site    General cares:  DVT Prophylaxis: Low Risk/Ambulatory with no VTE prophylaxis indicated  GI Prophylaxis: Not indicated  Restraints: Restraints for medical healing needed: DANY Rivero  Sauvageau, NP  Time Spent on this Encounter   Billing:  I spent 35 minutes bedside and on the inpatient unit today managing the care of Brittani Gaviria in relation to the issues listed in this note.  Interval History   Patient admitted to the ICU post-op from a transcatheter pulmonary valve replacement.    Physical Exam   Temp: 98.8  F (37.1  C) Temp src: Axillary Temp  Min: 98.8  F (37.1  C)  Max: 98.8  F (37.1  C) BP: 137/83 Pulse: 78   Resp: 23 SpO2: 96 % O2 Device: None (Room air) Oxygen Delivery: 6 LPM  Vitals:    09/23/24 0544   Weight: 87 kg (191 lb 12.8 oz)     No intake/output data recorded.    GEN: Sitting in bed, awake and alert  EYES: PERRL, Anicteric sclera.   HEENT:  Normocephalic, atraumatic, trachea midline  CV: RRR, extremities warm and well perfused  PULM/CHEST: Clear breath sounds bilaterally without rhonchi, crackles or wheeze, symmetric chest rise  GI: normal bowel sounds, soft, non-tender, no rebound tenderness or guarding, no masses  : baker catheter in place, urine yellow and clear  EXTREMITIES: no peripheral edema, moving all extremities, peripheral pulses intact  NEURO: Cranial nerves II-XII grossly intact, no motor-sensory deficits noted  SKIN: No rashes, sores or ulcerations  PSYCH:  Affect: appropriate      Data   Recent Labs   Lab 09/23/24  1120 09/23/24  1059 09/23/24  0848   HGB 12.1 11.3* 13.2    144 145   POTASSIUM 3.2* 2.9* 3.1*   GLC 86 84 88

## 2024-09-23 NOTE — Clinical Note
ascending aorta Cine(s)  injectedRate (mL/sec) 35 Total Volume (mL) 23  10 Indonesian/ 28 CAU and 90 Indonesian/ 5 CRA

## 2024-09-23 NOTE — Clinical Note
The first balloon was inserted into the pulmonary artery.Max pressure = 2 mikaela. Total duration = 20 seconds.

## 2024-09-23 NOTE — Clinical Note
ascending aorta Cine(s)  injectedRate (mL/sec) 30 Total Volume (mL) 24  10 Chilean/ 28 CAU and 90 Chilean/ 5 CRA

## 2024-09-23 NOTE — PROGRESS NOTES
"PACU to Inpatient Nursing Handoff    Patient Brittani Gaviria is a 34 year old female who speaks English.   Procedure Procedure(s):  Heart Catheterization, Transcatheter pulmonary valve replacement   Surgeon(s) Primary: Johnathon Fraire MD  Assisting: Dain Alberto MD     Allergies   Allergen Reactions    Blood Transfusion Related (Informational Only) Other (See Comments)     Patient has a history of a clinically significant antibody against RBC antigens.  A delay in compatible RBCs may occur.    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    Anesthesia General   Dermatome Level     Preop Meds Not applicable   Nerve block Not applicable   Intraop Meds dexamethasone (Decadron)  dexmedetomidine (Precedex): 20 mcg total  fentanyl (Sublimaze): 150 mcg total  ondansetron (Zofran): last given at 1206   Local Meds Yes   Antibiotics cefazolin (Ancef) - last given at 1315     Pain Patient Currently in Pain: denies   PACU meds  Not applicable   PCA / epidural No   Capnography     Telemetry ECG Rhythm: Sinus rhythm   Inpatient Telemetry Monitor Ordered? Yes        Labs Glucose Lab Results   Component Value Date    GLC 86 09/23/2024    GLC 84 07/20/2016       Hgb Lab Results   Component Value Date    HGB 12.1 09/23/2024    HGB 13.5 07/01/2024    HGB  11/30/2016     CHANGE ORDER PER MD  CORRECTED ON 12/01 AT 0931: PREVIOUSLY REPORTED AS 8.1      HGB 8.1 11/30/2016       INR No results found for: \"INR\"   PACU Imaging Not applicable     Wound/Incision   "   CMS        Equipment Not applicable   Other LDA       IV Access Peripheral IV 09/23/24 Left;Posterior Hand (Active)   Number of days: 0       Peripheral IV 09/23/24 Right;Posterior Lower forearm (Active)   Number of days: 0      Blood Products Not applicable EBL 10 mL   Intake/Output Date 09/23/24 0700 - 09/24/24 0659   Shift 9155-7587 5001-8944 0485-6541 24 Hour Total   INTAKE   I.V. 900   900   Shift Total(mL/kg) 900(10.34)   900(10.34)   OUTPUT   Urine 400   400   Shift Total(mL/kg) 400(4.6)   400(4.6)   Weight (kg) 87 87 87 87      Drains / Jackson     Time of void PreOp Time of Void Prior to Procedure: 0619 (09/23/24 0613)    PostOp      Diapered? No   Bladder Scan     PO    tolerating sips     Vitals    B/P: (!) 140/83  T: 98.8  F (37.1  C)    Temp src: Axillary  P:  Pulse: 70 (09/23/24 1315)          R: 26  O2:  SpO2: 98 %    O2 Device: None (Room air) (09/23/24 1315)    Oxygen Delivery: 6 LPM (09/23/24 1255)         Family/support present -   Patient belongings     Patient transported on bed   DC meds/scripts (obs/outpt) Not applicable   Inpatient Pain Meds Released? Yes       Special needs/considerations None   Tasks needing completion None     David Torres

## 2024-09-23 NOTE — Clinical Note
RCA Cine(s)  injectedRate (mL/sec) 3 Total Volume (mL) 5  10 Nigerian/ 28 CAU and 90 Nigerian/ 5 CRA

## 2024-09-23 NOTE — Clinical Note
dry, intact, no bleeding, no hematoma and slight oozing. Perclose right femoral vein, angio-seal right femoral artery, intermittent site bleeding, but okay after holding pressure.

## 2024-09-23 NOTE — Clinical Note
right ventricle Cine(s)  injectedRate (mL/sec) 22 Total Volume (mL) 23  10 Palestinian/ 28 CRA and 90 Palestinian/ 5 CRA

## 2024-09-23 NOTE — DISCHARGE SUMMARY
"                               [facility]  Cardiac Catheterization & Electrophysiology Laboratory  Discharge Summary    Brittani Gaviria MRN# [id number]   YOB: 1990 Age: 34 year old     Date of Admission:  9/23/2024  Date of Discharge:  9/24/2024  Physician:   Johnathon Llanos MD  Primary Care Provider: Leila Tay           Diagnoses:   Tetralogy of Fallot  S/P transannular patch (11 mo)  S/P \"valve repair\" (6 yo, [place])  S/P 25 mm RV-PA homograft (11 yo, [place])  Moderate-severe PI  RV enlargement  Palpitations from NSVT and SVT on diltiazem  HTN on losartan  Exercise intolerance, worsening SOB and energy levels          Hospital Course, Procedures, Findings, Outcomes:   Brittani Gaviria is a 34 year old female with a past medical history of tetralogy of fallot s/p transannular patch (11 mo.), a \"valve repair\" (6 yo, [place]), and 25mm RV-PA homograft (11 yo, [place]). She has moderate-severe pulmonary insufficiency, RV enlargement, NSVT/SVT on diltiazem, HTN on losartan. She has been noting worsening exercise intolerance, SOB, and decreased energy levels, therefore she underwent the below listed procedures with Dr. Dolly Hernandez on 9/23/24.     Procedures:  ultrasound guided vascular access  right and retrograde left heart cath  angiography  Transcatheter pulmonary valve replacement with 26mm Myriam 3 valve  Perclose x2 of RFV, angioseal of RFA    Findings/Outcomes:  At baseline  Normal cardiac output of 6.97 L/min, normal PVR of 1.43 [place]. Qp:Qs 1.00:1.  Slightly elevated right sided filling pressure s(10-11), RV <1/2 systemic  Pulmonary insufficiency  Normal right and left coronary angiography. RCA is clockwise rotated in very close proximity to conduit.   Coronary compression testing did not demonstrate compression or significant distortion  Post-Intervention  Gradient of 8 mmHg from RV to MPA  Stable valve placement  No " pulmonary insufficiency following valve placement  No coronary compression or distortion s/p 26mm Myriam valve.    Following the procedure, she was monitored overnight on 10A adult ICU. Hospitalization course was significant for intermittent wide complex slow arrhythmia noted on telemetry monitoring. With first episode noted overnight after standing up to get to the commode. She denied palpitations or symptoms at that time. Cardiology was informed, and telemetry reviewed, demonstrating a few short runs of wide complex rhythm with rates 60-70s noted. Lytes checked and K of 3.3 replaced. ~0750 9/24/24, again noted to have change in QRS morphology, narrow and wide complex rhythm, with rates 60-70s, persistent for >1 hr. Remains vitally stable with BP 130s/80s. She reports some heartburn for which she is receiving Tums, as well as 4/10 substernal chest pressure. No other palpitations or pain. No SOB, N/V (only noted immediately following antibiotic dose, now resolved), no dizziness or lightheadedness.    Telemetry and EKG reviewed with electrophysiology on call and Dr. Hernandez, consistent with intermittent RBBB, no signs of ischemia.     This again resolved spontaneously, and patient reports all symptoms have resolved, including chest pressure and heart burn.    She otherwise denies any concerns. No pain or numbness/tingling/weakness of the right lower extremity. RFA and RFV access site w/out bleeding or hematoma, right DP/PT pulses intact, sensation intact. XR 9/23/24 showed device in good position. Echo 9/24/24 AM (~10-20min after conversion to wide complex rhythm) demonstrates device in good position, good function, peak gradient across prosthetic pulmonary valve is 21 mmHg, mean 12 mmHg, mild paravalvar leak, trivial TR, RV mildly dilated with normal systolic function, LVEF 61%. Completed 24h antibiotics. Tolerating aspirin 81 mg daily.     Plan for discharge home once Ziopatch placed. Discharge instructions  "discussed with patient, who endorses understanding. The above was staffed with Dr. Hernandez, who is in agreement with the plan.          Pending Results:   N/A           Discharge Weight, Vitals, Exam:   Blood pressure 138/84, pulse 76, temperature 98.8  F (37.1  C), temperature source Axillary, resp. rate 20, height 1.687 m (5' 6.42\"), weight 87 kg (191 lb 12.8 oz), last menstrual period 01/01/2024, SpO2 99%, unknown if currently breastfeeding.  PHYSICAL EXAM:  GENERAL: Awake, alert, and in no acute distress  HEENT: Head: atraumatic, normocephalic. Eyes: Normal sclera and conjunctiva, anicteric, PERRLA, EOMi. ENT: Mucosa pink and dry, no lesions.   NECK: No lymphadenopathy.  RESPIRATORY: No respiratory distress. Lungs CTA bilaterally.   CARDIOVASCULAR: Rate 70s, regular rhythm. No audible S1, fixed split S2, 2-3/6 systolic murmur. No rubs/gallop.  GI: No pain w/ palpation. Bowel sounds intact.  SKIN: No rashes noted. No bruising.  NEURO: Awake, alert. Facial movements symmetric. Moving all extremities.   EXTREMITIES: RFA/RFV access site w/out bleeding, drainage, surrounding hematoma. Distal pulses palpable, 2+.           Recommendations, Plan:   Continue aspirin 81 mg for at least 6 months, as directed by your cardiologist   Infective endocarditis prophylaxis is indicated for the next 6 months. It is recommended that you avoid any dental procedures if possible for the next 6 months. If a procedure is necessary within this time, you will need to call your primary care provider for antibiotic prophylaxis.   Continue home losartan, spironolactone  Discontinue home scheduled diltiazem  Ziopatch x2 weeks         Follow-Up Appointments:   Dr. Hernandez in 2 weeks  Dr. Parks as scheduled 10/25/24 (1 month)         Wound Care, Activity Restrictions, Monitoring, and Other Instructions:   Watch the right groin site closely for any bleeding, swelling, redness, discharge, or change in color/temperature/sensation of the right " leg. Call immediately if these signs or symptoms are noted  For the first 6 months after a cardiac device placement, you are at higher risk of endocarditis, or an infection in your heart. For this reason, if you have a fever in the next six months, please call the cardiology department, as you may need to be evaluated at the hospital.  Keep the site clean and dry  You may leave the site uncovered; if you want to cover it with a band-aid be sure to change the band-aid any time it gets wet or dirty  Avoid vigorous activity for 48 hours to reduce the risk of bleeding from the site  Do not soak the site (bathe or swim) for 48 hours; okay to shower or sponge-bathe after 24 hours  If you have any questions about the site, either your primary care provider or your cardiologist can examine it  To reach [facility] cardiologist at any time please call [phone] (M-F 7:30 AM- 4:30 PM) or [phone] and ask for the on-call pediatric cardiologist (anytime)    Cathy Mcmillan PA-C (Jenna)  Pediatric Cardiology  [facility]    I saw this patient with the resident/fellow and agree with the resident s/fellow's findings and plan of care as documented in the note above. I have reviewed this patient's history, examined the patient and reviewed the vital signs, lab results, imaging, echocardiogram and other diagnostic testing. I have discussed the plan of care with the patients primary team and agree with the findings and recommendations outlined above.     Please feel free to reach us in case of questions or concerns.            Johnathon Hernandez MD  Pediatric Cardiology

## 2024-09-23 NOTE — Clinical Note
RCA Cine(s)  injectedRate (mL/sec) 3 Total Volume (mL) 5  10 Citizen of [place]/ 28 CAU and 90 Citizen of [place]/ 5 CRA

## 2024-09-23 NOTE — Clinical Note
The first balloon was inserted into the main pulmonary artery.Max pressure = 5 mikaela. Total duration = 20 seconds.

## 2024-09-23 NOTE — PROGRESS NOTES
Brief Follow Up Note     Brittani Gaviria was seen in the PACU following her procedure with PACU RN at bedside. No concerns per PACU RN. Brittani Gaviria denies any chest or leg pain, shortness of breath, or numbness/tingling/weakness of the right lower extremity. RFA and RFV access site w/out bleeding or hematoma, right DP/PT pulses intact, sensation intact. Vitally stable. Has completed a dose of antibiotics that was due.  Denies any further questions.     Plan for transfer to unit 10A. Plan has been discussed with patient and PACU RN, signout has been completed with 10A providers.      GISELE UgarteC  Pediatric Cardiology  [facility]

## 2024-09-23 NOTE — Clinical Note
RCA Cine(s)  injectedRate (mL/sec) 3 Total Volume (mL) 5  10 Lebanese/ 28 CRA and 90 Lebanese/ 5 CRA

## 2024-09-23 NOTE — Clinical Note
The first balloon was inserted into the main pulmonary artery.Max pressure = 2 mikaela. Total duration = 22 seconds.

## 2024-09-23 NOTE — Clinical Note
LCA Cine(s)  injectedRate (mL/sec) 4 Total Volume (mL) 9  10 Bahamian/ 28 CAU and 90 Bahamian/ 5 CRA

## 2024-09-23 NOTE — Clinical Note
RCA Cine(s)  injectedRate (mL/sec) 3 Total Volume (mL) 5  10 Marshallese/ 28 CRA and 90 Marshallese/ 5 CRA

## 2024-09-23 NOTE — Clinical Note
right ventricle Cine(s)  injectedTotal Volume (mL) 15  10 Macedonian/ 28 CRA and 90 Macedonian/ 5 CRA

## 2024-09-23 NOTE — ANESTHESIA CARE TRANSFER NOTE
Patient: Brittani Gaviria    Procedure: Procedure(s):  Heart Catheterization, Transcatheter pulmonary valve replacement       Diagnosis: decrease in functional capacity. Pulmonary insuficiency  Diagnosis Additional Information: No value filed.    Anesthesia Type:   General     Note:    Oropharynx: oropharynx clear of all foreign objects and spontaneously breathing  Level of Consciousness: drowsy  Oxygen Supplementation: face mask  Level of Supplemental Oxygen (L/min / FiO2): 8  Independent Airway: airway patency satisfactory and stable  Dentition: dentition unchanged  Vital Signs Stable: post-procedure vital signs reviewed and stable  Report to RN Given: handoff report given  Patient transferred to: PACU    Handoff Report: Identifed the Patient, Identified the Reponsible Provider, Reviewed the pertinent medical history, Discussed the surgical course, Reviewed Intra-OP anesthesia mangement and issues during anesthesia, Set expectations for post-procedure period and Allowed opportunity for questions and acknowledgement of understanding  Vitals:  Vitals Value Taken Time   /88 09/23/24 1254   Temp     Pulse 68 09/23/24 1258   Resp 24 09/23/24 1258   SpO2 100 % 09/23/24 1258   Vitals shown include unfiled device data.    Electronically Signed By: KATE Nazario CRNA  September 23, 2024  12:59 PM

## 2024-09-23 NOTE — Clinical Note
The first balloon was inserted into the main pulmonary artery.Max pressure = 6 mikaela. Total duration = 20 seconds.

## 2024-09-23 NOTE — Clinical Note
RCA Cine(s)  injectedRate (mL/sec) 3 Total Volume (mL) 5  10 Argentine/ 28 CRA and 90 Argentine/ 5 CRA

## 2024-09-24 ENCOUNTER — APPOINTMENT (OUTPATIENT)
Dept: CARDIOLOGY | Facility: CLINIC | Age: 34
DRG: 267 | End: 2024-09-24
Attending: PHYSICIAN ASSISTANT
Payer: COMMERCIAL

## 2024-09-24 ENCOUNTER — TRANSCRIBE ORDERS (OUTPATIENT)
Dept: PEDIATRIC CARDIOLOGY | Facility: CLINIC | Age: 34
End: 2024-09-24

## 2024-09-24 VITALS
SYSTOLIC BLOOD PRESSURE: 148 MMHG | WEIGHT: 191.1 LBS | BODY MASS INDEX: 30.71 KG/M2 | DIASTOLIC BLOOD PRESSURE: 85 MMHG | HEART RATE: 71 BPM | OXYGEN SATURATION: 98 % | RESPIRATION RATE: 30 BRPM | TEMPERATURE: 98 F | HEIGHT: 66 IN

## 2024-09-24 DIAGNOSIS — Q21.3 TOF (TETRALOGY OF FALLOT): Primary | ICD-10-CM

## 2024-09-24 DIAGNOSIS — I51.7 ENLARGED RV (RIGHT VENTRICLE): ICD-10-CM

## 2024-09-24 DIAGNOSIS — I37.1 PULMONARY VALVE INSUFFICIENCY, UNSPECIFIED ETIOLOGY: ICD-10-CM

## 2024-09-24 LAB
ALBUMIN SERPL BCG-MCNC: 3.6 G/DL (ref 3.5–5.2)
ALP SERPL-CCNC: 42 U/L (ref 40–150)
ALT SERPL W P-5'-P-CCNC: 14 U/L (ref 0–50)
ANION GAP SERPL CALCULATED.3IONS-SCNC: 8 MMOL/L (ref 7–15)
AST SERPL W P-5'-P-CCNC: 16 U/L (ref 0–45)
ATRIAL RATE - MUSE: 76 BPM
BILIRUB SERPL-MCNC: 0.9 MG/DL
BUN SERPL-MCNC: 8.6 MG/DL (ref 6–20)
CALCIUM SERPL-MCNC: 9.6 MG/DL (ref 8.8–10.4)
CHLORIDE SERPL-SCNC: 107 MMOL/L (ref 98–107)
CREAT SERPL-MCNC: 0.85 MG/DL (ref 0.51–0.95)
DIASTOLIC BLOOD PRESSURE - MUSE: NORMAL MMHG
EGFRCR SERPLBLD CKD-EPI 2021: >90 ML/MIN/1.73M2
ERYTHROCYTE [DISTWIDTH] IN BLOOD BY AUTOMATED COUNT: 12.6 % (ref 10–15)
GLUCOSE BLDC GLUCOMTR-MCNC: 103 MG/DL (ref 70–99)
GLUCOSE BLDC GLUCOMTR-MCNC: 103 MG/DL (ref 70–99)
GLUCOSE BLDC GLUCOMTR-MCNC: 117 MG/DL (ref 70–99)
GLUCOSE BLDC GLUCOMTR-MCNC: 122 MG/DL (ref 70–99)
GLUCOSE SERPL-MCNC: 96 MG/DL (ref 70–99)
HCO3 SERPL-SCNC: 23 MMOL/L (ref 22–29)
HCT VFR BLD AUTO: 37.9 % (ref 35–47)
HGB BLD-MCNC: 12.3 G/DL (ref 11.7–15.7)
INTERPRETATION ECG - MUSE: NORMAL
MAGNESIUM SERPL-MCNC: 1.8 MG/DL (ref 1.7–2.3)
MCH RBC QN AUTO: 28.9 PG (ref 26.5–33)
MCHC RBC AUTO-ENTMCNC: 32.5 G/DL (ref 31.5–36.5)
MCV RBC AUTO: 89 FL (ref 78–100)
P AXIS - MUSE: 0 DEGREES
PA AA BLD-ACNC: 437 ARU
PHOSPHATE SERPL-MCNC: 3 MG/DL (ref 2.5–4.5)
PLATELET # BLD AUTO: 192 10E3/UL (ref 150–450)
POTASSIUM SERPL-SCNC: 3.3 MMOL/L (ref 3.4–5.3)
POTASSIUM SERPL-SCNC: 3.4 MMOL/L (ref 3.4–5.3)
PR INTERVAL - MUSE: 128 MS
PROT SERPL-MCNC: 7.3 G/DL (ref 6.4–8.3)
QRS DURATION - MUSE: 138 MS
QT - MUSE: 434 MS
QTC - MUSE: 488 MS
R AXIS - MUSE: 89 DEGREES
RBC # BLD AUTO: 4.26 10E6/UL (ref 3.8–5.2)
SODIUM SERPL-SCNC: 138 MMOL/L (ref 135–145)
SYSTOLIC BLOOD PRESSURE - MUSE: NORMAL MMHG
T AXIS - MUSE: 90 DEGREES
VENTRICULAR RATE- MUSE: 76 BPM
WBC # BLD AUTO: 9.4 10E3/UL (ref 4–11)

## 2024-09-24 PROCEDURE — 99238 HOSP IP/OBS DSCHRG MGMT 30/<: CPT | Mod: 25 | Performed by: PEDIATRICS

## 2024-09-24 PROCEDURE — 93303 ECHO TRANSTHORACIC: CPT | Mod: 26 | Performed by: PEDIATRICS

## 2024-09-24 PROCEDURE — 93320 DOPPLER ECHO COMPLETE: CPT | Mod: 26 | Performed by: PEDIATRICS

## 2024-09-24 PROCEDURE — 93325 DOPPLER ECHO COLOR FLOW MAPG: CPT

## 2024-09-24 PROCEDURE — 83735 ASSAY OF MAGNESIUM: CPT | Performed by: INTERNAL MEDICINE

## 2024-09-24 PROCEDURE — 250N000013 HC RX MED GY IP 250 OP 250 PS 637: Performed by: PEDIATRICS

## 2024-09-24 PROCEDURE — 93005 ELECTROCARDIOGRAM TRACING: CPT

## 2024-09-24 PROCEDURE — 250N000013 HC RX MED GY IP 250 OP 250 PS 637

## 2024-09-24 PROCEDURE — 84100 ASSAY OF PHOSPHORUS: CPT | Performed by: INTERNAL MEDICINE

## 2024-09-24 PROCEDURE — 36415 COLL VENOUS BLD VENIPUNCTURE: CPT | Performed by: PEDIATRICS

## 2024-09-24 PROCEDURE — 93325 DOPPLER ECHO COLOR FLOW MAPG: CPT | Mod: 26 | Performed by: PEDIATRICS

## 2024-09-24 PROCEDURE — 80053 COMPREHEN METABOLIC PANEL: CPT | Performed by: INTERNAL MEDICINE

## 2024-09-24 PROCEDURE — 250N000011 HC RX IP 250 OP 636: Performed by: PEDIATRICS

## 2024-09-24 PROCEDURE — 85576 BLOOD PLATELET AGGREGATION: CPT

## 2024-09-24 PROCEDURE — 84132 ASSAY OF SERUM POTASSIUM: CPT | Performed by: INTERNAL MEDICINE

## 2024-09-24 PROCEDURE — 93010 ELECTROCARDIOGRAM REPORT: CPT | Mod: GC | Performed by: INTERNAL MEDICINE

## 2024-09-24 PROCEDURE — 250N000013 HC RX MED GY IP 250 OP 250 PS 637: Performed by: INTERNAL MEDICINE

## 2024-09-24 PROCEDURE — 85027 COMPLETE CBC AUTOMATED: CPT | Performed by: INTERNAL MEDICINE

## 2024-09-24 PROCEDURE — 99207 PR NO CHARGE LOS: CPT | Performed by: PEDIATRICS

## 2024-09-24 PROCEDURE — 36415 COLL VENOUS BLD VENIPUNCTURE: CPT | Performed by: INTERNAL MEDICINE

## 2024-09-24 RX ORDER — CALCIUM CARBONATE 500 MG/1
500 TABLET, CHEWABLE ORAL DAILY PRN
Status: DISCONTINUED | OUTPATIENT
Start: 2024-09-24 | End: 2024-09-24 | Stop reason: HOSPADM

## 2024-09-24 RX ORDER — POTASSIUM CHLORIDE 1500 MG/1
40 TABLET, EXTENDED RELEASE ORAL ONCE
Status: COMPLETED | OUTPATIENT
Start: 2024-09-24 | End: 2024-09-24

## 2024-09-24 RX ORDER — POTASSIUM CHLORIDE 1.5 G/1.58G
40 POWDER, FOR SOLUTION ORAL ONCE
Status: COMPLETED | OUTPATIENT
Start: 2024-09-24 | End: 2024-09-24

## 2024-09-24 RX ADMIN — ASPIRIN 81 MG: 81 TABLET, COATED ORAL at 09:09

## 2024-09-24 RX ADMIN — SPIRONOLACTONE 25 MG: 25 TABLET ORAL at 09:09

## 2024-09-24 RX ADMIN — CEFAZOLIN 1000 MG: 1 INJECTION, POWDER, FOR SOLUTION INTRAMUSCULAR; INTRAVENOUS at 05:06

## 2024-09-24 RX ADMIN — LOSARTAN POTASSIUM 50 MG: 25 TABLET, FILM COATED ORAL at 09:09

## 2024-09-24 RX ADMIN — POTASSIUM CHLORIDE 40 MEQ: 1500 TABLET, EXTENDED RELEASE ORAL at 09:09

## 2024-09-24 RX ADMIN — POTASSIUM CHLORIDE 40 MEQ: 1.5 POWDER, FOR SOLUTION ORAL at 14:44

## 2024-09-24 RX ADMIN — ACETAMINOPHEN 650 MG: 325 TABLET ORAL at 05:22

## 2024-09-24 RX ADMIN — CALCIUM CARBONATE (ANTACID) CHEW TAB 500 MG 500 MG: 500 CHEW TAB at 09:08

## 2024-09-24 ASSESSMENT — ACTIVITIES OF DAILY LIVING (ADL)
ADLS_ACUITY_SCORE: 31

## 2024-09-24 NOTE — PROGRESS NOTES
"[facility]  Critical Care Service  Progress Note  Date of Service (when I saw the patient): 09/24/2024  Main Plans for Today    Discharge home pending EP recommendations  Potassium replacement  Assessment & Plan   Brittani Gaviria is a 34 year old female with PMH significant for TOF s/p surgical repair at 11 months, s/p pulmonary valve repair at 8yo, PVR at 14yo who was admitted to the ICU on 9/23/2024 post-operatively following a transcatheter pulmonary valve replacement.    Neuro:  No acute issues  - Monitor neurological status    CV:  # S/P Transcatheter pulmonary valve replacement  # Hx TOF s/p repair  # Palpitations from NSVT and SVT  # Hypertension   # RV enlargement  # Moderate to severe PI  Plan per pediatric CV surgery:  CXR 9/23 evening: Pulmonary valve positioned correctly  ECHO today  ASA 81mg starting today  Continue PTA losartan and spironolactone tomorrow  -- Continuous cardiac monitoring  -- Alert CV surgery team if any arrhythmias or chest pain  Patient had a wide complex ventricular rhythm last evening that was provoke when she ambulated to the bathroom.  Patient was asymptomatic. Lasted for approx. 10 mins.  CXR done and confirmed placement. Dr. Hernandez notified.  This AM patient went into the Rhythm again after ambulating again.  Complaining of chest \"heaviness\" at the left sternal border but no pain.  Dedra Mcmillan called and came to bedside.  Echocardiogram done and valve is in good position.  They will consult with EP and develop a plan.  Patient stayed in this rhythm x2 hours this time.     Respiratory:  # Shortness of breath  Patient had been noticing increasing SOB, exercise intolerance and decreased energy levels, therefore she was worked up to undergo a transcatheter pulmonary valve replacement.  -- Denies SOB post-op  -- Oxygen saturations >95% on room air    GI/Nutrition:  No acute issues  Plan:  -- Advance diet as tolerated    Renal:  No " acute issues  -- monitor function and electrolytes as needed with replacement per ICU protocols.   -- follow I/O's as appropriate.  -- maintain euvolemia  -- Potassium replaced today    ID:  # Meg-operative abx  - Cefazolin 1g Q8H x3 doses  - Monitor for s/sx infection    Endocrine:  No acute issues  -- Keep BG  <180 for optimal healing    Heme:  No acute issues    MSK:  No acute issues    Skin:  Right groin angioseal  - Monitor site    General cares:  DVT Prophylaxis: Low Risk/Ambulatory with no VTE prophylaxis indicated  GI Prophylaxis: Not indicated  Restraints: Restraints for medical healing needed: NO    Josefa Cornelius NP  Time Spent on this Encounter   Billing:  I spent 35 minutes bedside and on the inpatient unit today managing the care of Brittani Gaviria in relation to the issues listed in this note.  Interval History   Patient admitted to the ICU post-op from a transcatheter pulmonary valve replacement. Pending discharge home after EP recommendations   Physical Exam   Temp: 97.9  F (36.6  C) Temp src: Oral Temp  Min: 96.8  F (36  C)  Max: 99.1  F (37.3  C) BP: 135/84 Pulse: 75   Resp: 21 SpO2: 97 % O2 Device: None (Room air) Oxygen Delivery: 6 LPM  Vitals:    09/23/24 0544 09/23/24 1430 09/24/24 0400   Weight: 87 kg (191 lb 12.8 oz) 86.9 kg (191 lb 9.3 oz) 86.7 kg (191 lb 1.6 oz)     I/O last 3 completed shifts:  In: 2308.33 [P.O.:900; I.V.:1408.33]  Out: 1475 [Urine:1475]    GEN: Sitting in bed, awake and alert  EYES: PERRL, Anicteric sclera.   HEENT:  Normocephalic, atraumatic, trachea midline  CV: RRR, extremities warm and well perfused  PULM/CHEST: Clear breath sounds bilaterally without rhonchi, crackles or wheeze, symmetric chest rise  GI: normal bowel sounds, soft, non-tender, no rebound tenderness or guarding, no masses  : baker catheter in place, urine yellow and clear  EXTREMITIES: no peripheral edema, moving all extremities, peripheral pulses intact  NEURO: Cranial nerves II-XII grossly  intact, no motor-sensory deficits noted  SKIN: No rashes, sores or ulcerations  PSYCH:  Affect: appropriate      Data   Recent Labs   Lab 09/24/24  0854 09/24/24  0617 09/24/24  0358 09/23/24 2012 09/23/24  1857 09/23/24  1438 09/23/24  1120 09/23/24  1059   WBC  --  9.4  --   --   --   --   --   --    HGB  --  12.3  --   --   --   --  12.1 11.3*   MCV  --  89  --   --   --   --   --   --    PLT  --  192  --   --   --   --   --   --    NA  --  138  --   --  140  --  144 144   POTASSIUM  --  3.3*  --   --  4.0  --  3.2* 2.9*   CHLORIDE  --  107  --   --  105  --   --   --    CO2  --  23  --   --  23  --   --   --    BUN  --  8.6  --   --  9.5  --   --   --    CR  --  0.85  --   --  0.99*  --   --   --    ANIONGAP  --  8  --   --  12  --   --   --    LISBET  --  9.6  --   --  10.4  --   --   --    * 96 103*   < > 135*   < > 86 84   ALBUMIN  --  3.6  --   --  3.9  --   --   --    PROTTOTAL  --  7.3  --   --  7.5  --   --   --    BILITOTAL  --  0.9  --   --  1.2  --   --   --    ALKPHOS  --  42  --   --  40  --   --   --    ALT  --  14  --   --  18  --   --   --    AST  --  16  --   --  19  --   --   --     < > = values in this interval not displayed.

## 2024-09-24 NOTE — PLAN OF CARE
Problem: Adult Inpatient Plan of Care  Goal: Plan of Care Review  Description: The Plan of Care Review/Shift note should be completed every shift.  The Outcome Evaluation is a brief statement about your assessment that the patient is improving, declining, or no change.  This information will be displayed automatically on your shift  note.  9/23/2024 2328 by Alonzo Duarte RN  Outcome: Progressing  Flowsheets (Taken 9/23/2024 2328)  Plan of Care Reviewed With: patient  Overall Patient Progress: improving  9/23/2024 2325 by Alonzo Duarte RN  Outcome: Progressing  Flowsheets (Taken 9/23/2024 2325)  Plan of Care Reviewed With: patient   Goal Outcome Evaluation:      Plan of Care Reviewed With: patient    Overall Patient Progress: improvingOverall Patient Progress: improving      ICU End of Shift Summary:       Neuro: Alert and oriented x4. Afebrile.  Reports of migraine headache, pain on right side of the head and right eye- relieved by PRN Tylenol.  Pulm/Resp: on room air. Clear lung sounds. No shortness of breath  CV: sinus rhythm. Denies any chest pain. -150's.  GI/: Regular  diet. No bowel movement during the shift. Continent with urine  Access: peripheral IV on right lower forearm- saline locked  Skin: no skin concerns. No bleeding, hematoma on right femoral (previous sheath site)  Gtts: none  Drains: none    Major Shift Events:   Patient had complaints of heartburn and upper back pain, with -150's, and had one episode of /94 at 5am, blood pressure rechecked- 153/90 at MARIANA notified, EKG completed, PRN Tylenol given.        Plan:  Monitor for arrhythmias  Echo in AM  Continue POC

## 2024-09-24 NOTE — DISCHARGE INSTRUCTIONS
"                               [facility]  Cardiac Catheterization & Electrophysiology Laboratory  Discharge Instructions    Brittani Gaviria MRN# [id number]   YOB: 1990 Age: 34 year old     Date of Admission:  9/23/2024  Date of Discharge:  9/24/2024  Physician:   Johnathon Llanos MD  Primary Care Provider: Leila Tay           Diagnoses:   Tetralogy of Fallot  S/P transannular patch (11 mo)  S/P \"valve repair\" (6 yo, [place])  S/P 25 mm RV-PA homograft (9 yo, [place])  Moderate-severe PI  RV enlargement  Palpitations from NSVT and SVT on diltiazem  HTN on losartan  Exercise intolerance, worsening SOB and energy levels          Procedures, Findings, Outcomes:   ultrasound guided vascular access  right and retrograde left heart cath  angiography  Transcatheter pulmonary valve replacement with 26mm Myriam 3 valve  Perclose x2 of RFV, angioseal of RFA         Pending Results:   N/A           Discharge Weight and Vitals:   Blood pressure 135/84, pulse 75, temperature 98  F (36.7  C), temperature source Oral, resp. rate 21, height 1.676 m (5' 6\"), weight 86.7 kg (191 lb 1.6 oz), last menstrual period 01/01/2024, SpO2 97%, unknown if currently breastfeeding.           Recommendations, Plan:   Continue aspirin 81 mg for at least 6 months, as directed by your cardiologist   Infective endocarditis prophylaxis is indicated for the next 6 months. It is recommended that you avoid any dental procedures if possible for the next 6 months. If a procedure is necessary within this time, you will need to call your primary care provider for antibiotic prophylaxis.   Continue home losartan, spironolactone  Discontinue home scheduled diltiazem  Ziopatch x2 weeks         Follow-Up Appointments:   Dr. Hernandez in 2 weeks  Dr. Parks as scheduled 10/25/24         Wound Care, Activity Restrictions, Monitoring, and Other Instructions:   Watch the right groin site " closely for any bleeding, swelling, redness, discharge, or change in color/temperature/sensation of the right leg. Call immediately if these signs or symptoms are noted  For the first 6 months after a cardiac device placement, you are at higher risk of endocarditis, or an infection in your heart. For this reason, if you have a fever in the next six months, please call the cardiology department, as you may need to be evaluated at the hospital.  Keep the site clean and dry  You may leave the site uncovered; if you want to cover it with a band-aid be sure to change the band-aid any time it gets wet or dirty  Avoid vigorous activity for 48 hours to reduce the risk of bleeding from the site  Do not soak the site (bathe or swim) for 48 hours; okay to shower or sponge-bathe after 24 hours  If you have any questions about the site, either your primary care provider or your cardiologist can examine it  To reach [facility] cardiologist at any time please call [phone] (M-F 7:30 AM- 4:30 PM) or [phone] and ask for the on-call pediatric cardiologist (anytime)

## 2024-09-24 NOTE — CONSULTS
Brief EP consult    Patient with a history of tetralogy of Fallot, status-post transannular patch repair during infancy. She underwent percutaneous pulmonary valve replacement yesterday for management of severe pulmonary insufficiency.     During observation, intermittent wide QRS was noticed, so I was called for rhythm interpretation. Electrocardiogram shows sinus rhythm, with right bundle branch block; there is improvement of the degree of intraventricular block after a premature supraventricular beat, suggesting the intermittent right bundle branch block is a phase 3 block. Telemetry reveals there is intermittent right bundle branch block without significant variation in the baseline rate.     The etiology of this finding is unclear. Possibilities include: chronic intermittent RBBB (strips showing similar findings on rhythm monitor from 11/24/23); trauma from catheterization. Ischemia seems less likely given no changes in AR, AV ratios, normal ST segments and lack of symptoms, but this cannot be rued out with the current available information.     Jose Diallo MD   of Pediatrics  Pediatric and Congenital Cardiac Electrophysiology  [facility]

## 2024-09-24 NOTE — PLAN OF CARE
Discharged to: Home at 1515 transported down to the front via wheelchair.    Belongings: Sent home with patient and .   AVS (After Visit Summary) discussed with: patient and family  Summary. See flowsheets for vital signs and detailed assessment.      Major Events this shift: Patient A&Ox4, cardiac arrhythmias when up to commode lasting two hours, ECG and echo completed, patient complained of heart burn/chest pressure and received Tums with relief. Potassium replaced twice this shift. VSS on RA. Procedure site free of complications. No bleeding, swelling or new pain noted.     Lines: Removed PIV. Dressing applied and sent guaze pad and tape with patient.     Plan: Discharge home with AVS  and discharge orders following current POC.        Goal Outcome Evaluation:      Plan of Care Reviewed With: patient    Overall Patient Progress: improvingOverall Patient Progress: improving

## 2024-09-25 ENCOUNTER — PATIENT OUTREACH (OUTPATIENT)
Dept: CARE COORDINATION | Facility: CLINIC | Age: 34
End: 2024-09-25
Payer: COMMERCIAL

## 2024-09-25 LAB
ATRIAL RATE - MUSE: 63 BPM
ATRIAL RATE - MUSE: 74 BPM
DIASTOLIC BLOOD PRESSURE - MUSE: NORMAL MMHG
DIASTOLIC BLOOD PRESSURE - MUSE: NORMAL MMHG
INTERPRETATION ECG - MUSE: NORMAL
INTERPRETATION ECG - MUSE: NORMAL
P AXIS - MUSE: 33 DEGREES
P AXIS - MUSE: 35 DEGREES
PR INTERVAL - MUSE: 126 MS
PR INTERVAL - MUSE: 142 MS
QRS DURATION - MUSE: 132 MS
QRS DURATION - MUSE: 188 MS
QT - MUSE: 448 MS
QT - MUSE: 450 MS
QTC - MUSE: 460 MS
QTC - MUSE: 497 MS
R AXIS - MUSE: 139 DEGREES
R AXIS - MUSE: 73 DEGREES
SYSTOLIC BLOOD PRESSURE - MUSE: NORMAL MMHG
SYSTOLIC BLOOD PRESSURE - MUSE: NORMAL MMHG
T AXIS - MUSE: 58 DEGREES
T AXIS - MUSE: 63 DEGREES
VENTRICULAR RATE- MUSE: 63 BPM
VENTRICULAR RATE- MUSE: 74 BPM

## 2024-09-25 NOTE — PROGRESS NOTES
Clinic Care Coordination Contact  Transitions of Care Outreach    Chief Complaint   Patient presents with    Clinic Care Coordination - Post Hospital       Most Recent Admission Date: 9/23/2024   Most Recent Admission Diagnosis: Tetralogy of Fallot - Q21.3     Most Recent Discharge Date: 9/24/2024   Most Recent Discharge Diagnosis: Tetralogy of Fallot - Q21.3  Adult congenital heart disease - Q24.9  RBBB - I45.10     Transitions of Care Assessment    Discharge Assessment  How are you doing now that you are home?: feeling better  How are your symptoms? (Red Flag symptoms escalate to triage hotline per guidelines): Improved  Do you know how to contact your clinic care team if you have future questions or changes to your health status? : Yes  Does the patient have their discharge instructions? : Yes  Does the patient have questions regarding their discharge instructions? : No  Were you started on any new medications or were there changes to any of your previous medications? : Yes  Does the patient have all of their medications?: Yes  Do you have questions regarding any of your medications? : No  Do you have all of your needed medical supplies or equipment (DME)?  (i.e. oxygen tank, CPAP, cane, etc.): Yes    Post-op ([facility] Only)  If the patient had a surgery or procedure, do they have any questions for a nurse?: No         Follow up Plan   Adult [facility] Follow-up and recommended labs and tests  Dr. Hernandez in 2 weeks  Dr. Wilkins in 1 month  Appointments on [facility] and/or [facility] (with [facility] or  [facility] provider or service). Call [phone] if you haven't heard  regarding these appointments within 7 days of discharge.    Future Appointments   Date Time Provider Department Center   10/10/2024  7:30 AM [email or web address] [facility]   10/10/2024  8:30 AM Johnathon Fraire MD [facility]   10/25/2024  8:00 AM [facility]   10/25/2024  9:15 AM Hallie Parks MD [facility]   12/10/2024  1:00  PM [facility]2 [facility]   12/10/2024  2:15 PM Neil Wilkins MD [facility]       Outpatient Plan as outlined on AVS reviewed with patient.      For any urgent concerns, please contact our 24 hour nurse triage line: [phone]     GRIFFIN Gonzalez  [phone]  [facility]

## 2024-09-27 ENCOUNTER — MYC MEDICAL ADVICE (OUTPATIENT)
Dept: PEDIATRIC CARDIOLOGY | Facility: CLINIC | Age: 34
End: 2024-09-27
Payer: COMMERCIAL

## 2024-09-27 DIAGNOSIS — Q24.9 ADULT CONGENITAL HEART DISEASE: ICD-10-CM

## 2024-09-27 DIAGNOSIS — I51.7 ENLARGED RV (RIGHT VENTRICLE): ICD-10-CM

## 2024-09-27 DIAGNOSIS — Q21.3 TOF (TETRALOGY OF FALLOT): Primary | ICD-10-CM

## 2024-09-27 DIAGNOSIS — I37.1 PULMONARY VALVE INSUFFICIENCY, UNSPECIFIED ETIOLOGY: ICD-10-CM

## 2024-09-27 DIAGNOSIS — I15.9 SECONDARY HYPERTENSION: Primary | ICD-10-CM

## 2024-09-27 NOTE — ANESTHESIA POSTPROCEDURE EVALUATION
Patient: Brittani Gaviria    Procedure: Procedure(s):  Heart Catheterization, Transcatheter pulmonary valve replacement       Anesthesia Type:  General    Note:  Disposition: Admission   Postop Pain Control: Uneventful            Sign Out: Well controlled pain   PONV: No   Neuro/Psych: Uneventful            Sign Out: Acceptable/Baseline neuro status   Airway/Respiratory: Uneventful            Sign Out: Acceptable/Baseline resp. status   CV/Hemodynamics: Uneventful            Sign Out: Acceptable CV status; No obvious hypovolemia; No obvious fluid overload   Other NRE: NONE   DID A NON-ROUTINE EVENT OCCUR? No    Event details/Postop Comments:  Brittani is awake and alert. Appropriate for transfer to 10th floor ICU.           Last vitals:  Vitals Value Taken Time   /88 09/23/24 1415   Temp 37.1  C (98.8  F) 09/23/24 1255   Pulse 79 09/23/24 1415   Resp 18 09/23/24 1415   SpO2 99 % 09/23/24 1415       Electronically Signed By: Mansi Franco MD  September 27, 2024  6:43 PM

## 2024-10-02 ENCOUNTER — TELEPHONE (OUTPATIENT)
Dept: PEDIATRIC CARDIOLOGY | Facility: CLINIC | Age: 34
End: 2024-10-02
Payer: COMMERCIAL

## 2024-10-02 ENCOUNTER — TRANSCRIBE ORDERS (OUTPATIENT)
Dept: PEDIATRIC CARDIOLOGY | Facility: CLINIC | Age: 34
End: 2024-10-02
Payer: COMMERCIAL

## 2024-10-02 DIAGNOSIS — Q21.3 TOF (TETRALOGY OF FALLOT): Primary | ICD-10-CM

## 2024-10-02 DIAGNOSIS — I37.1 PULMONARY VALVE INSUFFICIENCY, UNSPECIFIED ETIOLOGY: ICD-10-CM

## 2024-10-02 DIAGNOSIS — I10 HTN (HYPERTENSION): ICD-10-CM

## 2024-10-02 DIAGNOSIS — I51.7 ENLARGED RV (RIGHT VENTRICLE): ICD-10-CM

## 2024-10-02 NOTE — TELEPHONE ENCOUNTER
Outgoing call to discuss patient symptoms.     Patient did not answer/went to  as not available.     Was able to get in touch with patient via IMGuestt.     Beryl Soria RN on 10/2/2024 at 3:17 PM

## 2024-10-03 ENCOUNTER — MYC REFILL (OUTPATIENT)
Dept: CARDIOLOGY | Facility: CLINIC | Age: 34
End: 2024-10-03

## 2024-10-03 ENCOUNTER — OFFICE VISIT (OUTPATIENT)
Dept: PEDIATRIC CARDIOLOGY | Facility: CLINIC | Age: 34
End: 2024-10-03
Attending: PEDIATRICS
Payer: COMMERCIAL

## 2024-10-03 ENCOUNTER — HOSPITAL ENCOUNTER (OUTPATIENT)
Dept: CARDIOLOGY | Facility: CLINIC | Age: 34
Discharge: HOME | End: 2024-10-03
Attending: PEDIATRICS
Payer: COMMERCIAL

## 2024-10-03 VITALS
DIASTOLIC BLOOD PRESSURE: 95 MMHG | SYSTOLIC BLOOD PRESSURE: 154 MMHG | HEIGHT: 65 IN | HEART RATE: 75 BPM | WEIGHT: 194 LBS | RESPIRATION RATE: 16 BRPM | BODY MASS INDEX: 32.32 KG/M2 | OXYGEN SATURATION: 100 %

## 2024-10-03 DIAGNOSIS — Q21.3 TOF (TETRALOGY OF FALLOT): Primary | ICD-10-CM

## 2024-10-03 DIAGNOSIS — I51.7 ENLARGED RV (RIGHT VENTRICLE): ICD-10-CM

## 2024-10-03 DIAGNOSIS — I10 HYPERTENSION, UNSPECIFIED TYPE: ICD-10-CM

## 2024-10-03 DIAGNOSIS — Q21.3 TOF (TETRALOGY OF FALLOT): ICD-10-CM

## 2024-10-03 DIAGNOSIS — I37.1 PULMONARY VALVE INSUFFICIENCY, UNSPECIFIED ETIOLOGY: ICD-10-CM

## 2024-10-03 DIAGNOSIS — I47.10 NONSUSTAINED SUPRAVENTRICULAR TACHYCARDIA (H): ICD-10-CM

## 2024-10-03 DIAGNOSIS — I10 HTN (HYPERTENSION): ICD-10-CM

## 2024-10-03 LAB
ATRIAL RATE - MUSE: 67 BPM
DIASTOLIC BLOOD PRESSURE - MUSE: NORMAL MMHG
INTERPRETATION ECG - MUSE: NORMAL
P AXIS - MUSE: 1 DEGREES
PR INTERVAL - MUSE: 136 MS
QRS DURATION - MUSE: 134 MS
QT - MUSE: 436 MS
QTC - MUSE: 460 MS
R AXIS - MUSE: 88 DEGREES
SYSTOLIC BLOOD PRESSURE - MUSE: NORMAL MMHG
T AXIS - MUSE: 72 DEGREES
VENTRICULAR RATE- MUSE: 67 BPM

## 2024-10-03 PROCEDURE — 93304 ECHO TRANSTHORACIC: CPT

## 2024-10-03 PROCEDURE — 93304 ECHO TRANSTHORACIC: CPT | Mod: 26 | Performed by: PEDIATRICS

## 2024-10-03 PROCEDURE — 99215 OFFICE O/P EST HI 40 MIN: CPT | Mod: 25 | Performed by: PEDIATRICS

## 2024-10-03 PROCEDURE — 93005 ELECTROCARDIOGRAM TRACING: CPT

## 2024-10-03 PROCEDURE — 93325 DOPPLER ECHO COLOR FLOW MAPG: CPT | Mod: 26 | Performed by: PEDIATRICS

## 2024-10-03 PROCEDURE — 93321 DOPPLER ECHO F-UP/LMTD STD: CPT | Mod: 26 | Performed by: PEDIATRICS

## 2024-10-03 PROCEDURE — 93010 ELECTROCARDIOGRAM REPORT: CPT | Mod: RTG | Performed by: PEDIATRICS

## 2024-10-03 PROCEDURE — G0463 HOSPITAL OUTPT CLINIC VISIT: HCPCS | Performed by: PEDIATRICS

## 2024-10-03 PROCEDURE — 99417 PROLNG OP E/M EACH 15 MIN: CPT | Performed by: PEDIATRICS

## 2024-10-03 NOTE — PROGRESS NOTES
"                                                           Pediatric Cardiology Clinic Note    Patient:  Brittani Gaviria MRN:  [id number]   YOB: 1990 Age:  34 year old   Date of Visit:  Oct 3, 2024 PCP:  Leila Tay DO     Dear Leila Harris DO:    I had the pleasure of seeing your patient Brittani Gaviria at the [facility] for a consultation on Oct 3, 2024 for evaluation of sp TPVR.     History of Present Illness:     Brittani is a 34 year old with     TOF  S/P TAP at 11 months of age  SP \"valve repair\" at age 7 yrs ([facility] in [place], [place])  S/P 25 mm RV-PA homograft  ([facility] in [place], [place] , Dr Yue Johnson and Dr. Johnson) at 10 yrs of age (7/31/2001) (surgical report scanned on 4/26/24)  S/P TPVR 26 mm Myriam S3, 9/23/24  Hyperparathyroidism sp resection 7/2018 at [facility]  Sp C section x1 for the fourth pregnancy  SP ventral hernia repair, Oct 2022  Biliary dyskinesia S/P lap cholecystectomy 5/16/2014  SP EP study with no inducible VT, 7/1/2024 ([place])    Brittani is here today with concern of tenderness in her right groin. She has also noted some drainage from her right going wound.  This has been ongoing since the procedure but improving. Denies any redness or erythema. She has been compliant with her ASA and losartan and spironolactone. Has not been taking diltiazem. Denies any chest pain or palpitation. She endorses sharp quick pains in random parts of her chest, nothing sustained.     Still has a zio patch in place.     Otherwise moving around's and feels improve SOB when going up the stairs. No fevers or any other concerns.     Past Medical History:     PMH/Birth Hx:  The past medical history was reviewed with the patient and family today and updated    Past surgical Hx: As above    No recent ER visits or hospitalizations. No history of asthma.   Immunizations UTD per parents. " "  She has a current medication list which includes the following prescription(s): acetaminophen, amoxicillin, aspirin, benzoyl peroxide, blood pressure cuff, cholecalciferol, clindamycin, cyclobenzaprine, diltiazem, fluticasone, losartan, ondansetron, retin-a, spironolactone, sumatriptan, and ventolin hfa. Sheis allergic to blood transfusion related (informational only), ketorolac, and ferumoxytol.      Family and Social History:     The family history was reviewed and updated today. No significant changes were noted.   Mom/Parents report that there is no family history of congenital heart disease, early/unexplained sudden deaths, persons needing pacemakers/defibrillators at a young age.    Mom/Parents report that there is no family history of WPW syndrome, Brugada syndrome, or long QT syndrome.      Lives at home with  and children.     Review of Systems: A comprehensive review of systems was performed and is negative, except as noted in the HPI and PMH    Physical exam:  Her height is 1.66 m (5' 5.35\") and weight is 88 kg (194 lb 0.1 oz). Her blood pressure is 154/95 (abnormal) and her pulse is 75. Her respiration is 16 and oxygen saturation is 100%.   Her body mass index is 31.94 kg/m .  Her body surface area is 2.01 meters squared.  There is no central or peripheral cyanosis. Pupils are reactive and sclera are not jaundiced. There is no conjunctival injection or discharge. EOMI. Mucous membranes are moist and pink.   Lungs are clear to ausculation bilaterally with no wheezes, rales or rhonchi. There is no increased work of breathing, retractions or nasal flaring. Precordium is quiet with a normally placed apical impulse. On auscultation, heart sounds are regular with normal S1 and physiologically split S2. There are soft 2/6 systolic murmurs, silent diastole, no rubs or gallops.  Abdomen is soft and non-tender without masses or hepatomegaly. Femoral pulses are normal with no brachial femoral delay.Skin is " "without rashes, lesions, or significant bruising. Extremities are warm and well-perfused with no cyanosis, clubbing or edema. Peripheral pulses are normal and there is < 2 sec capillary refill. Patient is alert and oriented and moves all extremities equally with normal tone.     Right groin is tender with no erythema. There is mild induration just above skin incision. No ecchymosis. No bruising.     Vitals:    10/03/24 1039   BP: (!) 154/95   BP Location: Right arm   Patient Position: Sitting   Cuff Size: Adult Regular   Pulse: 75   Resp: 16   SpO2: 100%   Weight: 88 kg (194 lb 0.1 oz)   Height: 1.66 m (5' 5.35\")     Facility age limit for growth %rachelle is 20 years.  Facility age limit for growth %rachelle is 20 years.  Facility age limit for growth %rachelle is 20 years.  Facility age limit for growth %rachelle is 20 years.  Growth %ile SmartLinks can only be used for patients less than 20 years old.           Investigations and lab work:     Previous Investigations:  I personally reviewed the results of the patients previous investigations listed below.       Today's Investigations (October 3, 2024):  ECG:  The ECG today was ordered by me. I personally reviewed and interpreted this test.   It shows: Normal sinus rhythm, with a ventricular rate of 67bpm. Normal intervals. RBBB     Echocardiogram:  The Echocardiogram today was ordered by me. I personally reviewed this test.   It shows: There is flow acceleration across the prosthetic pulmonary valve with peak  gradient 24 mmHg and mean 12 mmHg. Mild paravalvar leak. Trivial aortic valve insufficiency. Trivial tricuspid and mitral valve insufficiency. The right ventricle is mildly dilated with normal systolic function. Normal left ventricular size and systolic function. No pericardial effusion. Trivial aortic valve insufficiency.    Bedside right groin ultrasound performed by myself. No hematomas, fluid collection, aneurysm or A-V fistulas. Subcutaneous tissue above entry site " appeared thick.         Assessment and Plan:     In summary, Brittani is a 34 year old sp most recently TPVR with a 26 mm S3. No complication intra or post op. She has residual trace-mild PVL with stable PG 24 and MG 12 mmHg across the TPVR. Her function is normal and trace AI are baseline.     Its unclear the source of her random chest pain specially since her echo and ECG and unremarkable for any concerns. Her Zio patch is still in place and I have recommended to take it off and return of analysis. She will do this today. Otherwise we discuss red flags and reason to reach out for guidance prior to her next appointment. I will plan to call back with Zio patch results.     I am very happy with today's echocardiogram and TPVR function. My hope is for the PVL to resolve with time, if it does not we could consider balloon angioplasty of TPVR, if necessary. We discuss the importance to continue taking the ASA and following SBE prophylaxis for life. The right groin incision is barely open but tender. I did a bedside echo today which was reassuring demonstrating no hematomas, fluid collection, aneurysm or A-V fistulas. The superficial skin is normal in appearance, not red. This all appears to be subcutaneous inflammation tissue secondary to having large bore sheaths and sp closure device? We discuss what to look for and return precautions were given.     She continues to be hypertensive. I have recommended for her to return in taking all her home medication including restarting diltiazem. She has a follow-up appointment with Dr. Parks at the end of the month. If his is ongoing probably she will need increase in dosage.     Thank you for the opportunity to participate in the care of Brittani Gaviria . Please do not hesitate to call with questions or concerns.    Sincerely,    Johnathon Hernandez MD  Pediatric Cardiology      60 min spent on the date of the encounter in chart review, patient visit, review of tests,  documentation and/or discussion with other providers about the issues documented above.       CC:    1. Leila Tay    2.  CC  Patient Care Team:  Leila Tay DO as PCP - Deyanira Gipson APRN CNP as Assigned Heart and Vascular Provider  Cogan, Jacob, MD as Physician (Hematology & Oncology)  Neil Wilkins MD as MD (Pediatric Cardiology)  Johnathon Fraire MD as Assigned Pediatric Specialist Provider  JOHNATHON FRAIRE        [Note: Chart documentation done in part with Dragon Voice Recognition software. Although reviewed after completion, some word and grammatical errors may remain.]

## 2024-10-03 NOTE — LETTER
"10/3/2024      RE: Brittani Gaviria  [address]  [place] [address]     Dear Colleague,    Thank you for the opportunity to participate in the care of your patient, Brittani Gaviria, at the [facility] at [facility]. Please see a copy of my visit note below.                                                               Pediatric Cardiology Clinic Note    Patient:  Brittani Gaviria MRN:  [id number]   YOB: 1990 Age:  34 year old   Date of Visit:  Oct 3, 2024 PCP:  Leila Tay DO     Dear Leila Harris DO:    I had the pleasure of seeing your patient Brittani Gaviria at the [facility] for a consultation on Oct 3, 2024 for evaluation of sp TPVR.     History of Present Illness:     Brittani is a 34 year old with     TOF  S/P TAP at 11 months of age  SP \"valve repair\" at age 7 yrs ([facility] in [place], [place])  S/P 25 mm RV-PA homograft  ([facility] in [place], [place] , Dr Yue Johnson and Dr. Johnson) at 10 yrs of age (7/31/2001) (surgical report scanned on 4/26/24)  S/P TPVR 26 mm Myriam S3, 9/23/24  Hyperparathy[place] sp [place] [facility] [place]  Sp C section x1 for the fourth pregnancy  SP ventral hernia repair, Oct 2022  Biliary dyskinesia S/P lap cholecystectomy 5/16/2014  SP EP study with no inducible VT, 7/1/2024 (Lucio)    Brittani is here today with concern of tenderness in her right groin. She has also noted some drainage from her right going wound.  This has been ongoing since the procedure but improving. Denies any redness or erythema. She has been compliant with her ASA and losartan and spironolactone. Has not been taking diltiazem. Denies any chest pain or palpitation. She endorses sharp quick pains in random parts of her chest, nothing sustained.     Still has a zio patch in place. " "    Otherwise moving around's and feels improve SOB when going up the stairs. No fevers or any other concerns.     Past Medical History:     PMH/Birth Hx:  The past medical history was reviewed with the patient and family today and updated    Past surgical Hx: As above    No recent ER visits or hospitalizations. No history of asthma.   Immunizations UTD per parents.   She has a current medication list which includes the following prescription(s): acetaminophen, amoxicillin, aspirin, benzoyl peroxide, blood pressure cuff, cholecalciferol, clindamycin, cyclobenzaprine, diltiazem, fluticasone, losartan, ondansetron, retin-a, spironolactone, sumatriptan, and ventolin hfa. Sheis allergic to blood transfusion related (informational only), ketorolac, and ferumoxytol.      Family and Social History:     The family history was reviewed and updated today. No significant changes were noted.   Mom/Parents report that there is no family history of congenital heart disease, early/unexplained sudden deaths, persons needing pacemakers/defibrillators at a young age.    Mom/Parents report that there is no family history of WPW syndrome, Brugada syndrome, or long QT syndrome.      Lives at home with  and children.     Review of Systems: A comprehensive review of systems was performed and is negative, except as noted in the HPI and PMH    Physical exam:  Her height is 1.66 m (5' 5.35\") and weight is 88 kg (194 lb 0.1 oz). Her blood pressure is 154/95 (abnormal) and her pulse is 75. Her respiration is 16 and oxygen saturation is 100%.   Her body mass index is 31.94 kg/m .  Her body surface area is 2.01 meters squared.  There is no central or peripheral cyanosis. Pupils are reactive and sclera are not jaundiced. There is no conjunctival injection or discharge. EOMI. Mucous membranes are moist and pink.   Lungs are clear to ausculation bilaterally with no wheezes, rales or rhonchi. There is no increased work of breathing, " "retractions or nasal flaring. Precordium is quiet with a normally placed apical impulse. On auscultation, heart sounds are regular with normal S1 and physiologically split S2. There are soft 2/6 systolic murmurs, silent diastole, no rubs or gallops.  Abdomen is soft and non-tender without masses or hepatomegaly. Femoral pulses are normal with no brachial femoral delay.Skin is without rashes, lesions, or significant bruising. Extremities are warm and well-perfused with no cyanosis, clubbing or edema. Peripheral pulses are normal and there is < 2 sec capillary refill. Patient is alert and oriented and moves all extremities equally with normal tone.     Right groin is tender with no erythema. There is mild induration just above skin incision. No ecchymosis. No bruising.     Vitals:    10/03/24 1039   BP: (!) 154/95   BP Location: Right arm   Patient Position: Sitting   Cuff Size: Adult Regular   Pulse: 75   Resp: 16   SpO2: 100%   Weight: 88 kg (194 lb 0.1 oz)   Height: 1.66 m (5' 5.35\")     Facility age limit for growth %rachelle is 20 years.  Facility age limit for growth %rachelle is 20 years.  Facility age limit for growth %rachelle is 20 years.  Facility age limit for growth %rachelle is 20 years.  Growth %ile SmartLinks can only be used for patients less than 20 years old.           Investigations and lab work:     Previous Investigations:  I personally reviewed the results of the patients previous investigations listed below.       Today's Investigations (October 3, 2024):  ECG:  The ECG today was ordered by me. I personally reviewed and interpreted this test.   It shows: Normal sinus rhythm, with a ventricular rate of 67bpm. Normal intervals. RBBB     Echocardiogram:  The Echocardiogram today was ordered by me. I personally reviewed this test.   It shows: There is flow acceleration across the prosthetic pulmonary valve with peak  gradient 24 mmHg and mean 12 mmHg. Mild paravalvar leak. Trivial aortic valve insufficiency. " Trivial tricuspid and mitral valve insufficiency. The right ventricle is mildly dilated with normal systolic function. Normal left ventricular size and systolic function. No pericardial effusion. Trivial aortic valve insufficiency.    Bedside right groin ultrasound performed by myself. No hematomas, fluid collection, aneurysm or A-V fistulas. Subcutaneous tissue above entry site appeared thick.         Assessment and Plan:     In summary, Brittani is a 34 year old sp most recently TPVR with a 26 mm S3. No complication intra or post op. She has residual trace-mild PVL with stable PG 24 and MG 12 mmHg across the TPVR. Her function is normal and trace AI are baseline.     Its unclear the source of her random chest pain specially since her echo and ECG and unremarkable for any concerns. Her Zio patch is still in place and I have recommended to take it off and return of analysis. She will do this today. Otherwise we discuss red flags and reason to reach out for guidance prior to her next appointment. I will plan to call back with Zio patch results.     I am very happy with today's echocardiogram and TPVR function. My hope is for the PVL to resolve with time, if it does not we could consider balloon angioplasty of TPVR, if necessary. We discuss the importance to continue taking the ASA and following SBE prophylaxis for life. The right groin incision is barely open but tender. I did a bedside echo today which was reassuring demonstrating no hematomas, fluid collection, aneurysm or A-V fistulas. The superficial skin is normal in appearance, not red. This all appears to be subcutaneous inflammation tissue secondary to having large bore sheaths and sp closure device? We discuss what to look for and return precautions were given.     She continues to be hypertensive. I have recommended for her to return in taking all her home medication including restarting diltiazem. She has a follow-up appointment with Dr. Parks at the end  of the month. If his is ongoing probably she will need increase in dosage.     Thank you for the opportunity to participate in the care of Brittani Gaviria . Please do not hesitate to call with questions or concerns.    Sincerely,    Johnathon Hernandez MD  Pediatric Cardiology      60 min spent on the date of the encounter in chart review, patient visit, review of tests, documentation and/or discussion with other providers about the issues documented above.       CC:    1. Leila Tay    2.  CC  Patient Care Team:  Leila Tay DO as PCP - Deyanira Gipson APRN CNP as Assigned Heart and Vascular Provider  Cogan, Jacob, MD as Physician (Hematology & Oncology)  Neil Wilkins MD as MD (Pediatric Cardiology)  Johnathon Fraire MD as Assigned Pediatric Specialist Provider  JOHNATHON FRAIRE        [Note: Chart documentation done in part with Dragon Voice Recognition software. Although reviewed after completion, some word and grammatical errors may remain.]       Please do not hesitate to contact me if you have any questions/concerns.     Sincerely,       Johnathon Llanos MD

## 2024-10-03 NOTE — PATIENT INSTRUCTIONS
[facility]  [address]  [address]  [place] [facility] [id number] [phone]      Cardiology Clinic   RN Care Coordinators: Юлия Lafleur, Beryl Soria  or Nya Rosa [phone]  Dr. Smith RN Care Coordinators  [phone]    Pediatric Cardiology Scheduling  [phone]     Services  [phone]    After Hours and Emergency Contact Number  [phone]  * Ask for the pediatric cardiologist on call         Prescription Renewals  The pharmacy must fax requests to [phone]  * Please allow 3-4 days for prescriptions to be authorized   Pediatric Call Center/ General Scheduling  [phone]    Imaging Scheduling for Peds Cardiology  [phone]  THEY WILL REACH OUT TO YOU TO SCHEDULE ANY IMAGING NEEDS THAT WERE ORDERED.    Your feedback is very important to us. If you receive a survey about your visit today, please take the time to fill this out so we can continue to improve.    We have several different opportunities for cardiology patients that include:    [email or web address]  [email or web address]  [email or web address]

## 2024-10-04 ENCOUNTER — MYC MEDICAL ADVICE (OUTPATIENT)
Dept: CARDIOLOGY | Facility: CLINIC | Age: 34
End: 2024-10-04
Payer: COMMERCIAL

## 2024-10-04 DIAGNOSIS — Q24.9 ADULT CONGENITAL HEART DISEASE: Primary | ICD-10-CM

## 2024-10-04 DIAGNOSIS — R00.2 PALPITATIONS: ICD-10-CM

## 2024-10-04 DIAGNOSIS — Q21.3 TOF (TETRALOGY OF FALLOT): ICD-10-CM

## 2024-10-04 RX ORDER — DILTIAZEM HCL 60 MG
60 TABLET ORAL 3 TIMES DAILY PRN
Qty: 90 TABLET | Refills: 0 | Status: SHIPPED | OUTPATIENT
Start: 2024-10-04 | End: 2024-10-10 | Stop reason: ALTCHOICE

## 2024-10-04 RX ORDER — DILTIAZEM HCL 60 MG
TABLET ORAL
Qty: 90 TABLET | Refills: 1 | OUTPATIENT
Start: 2024-10-04

## 2024-10-04 NOTE — TELEPHONE ENCOUNTER
diltiazem (CARDIZEM) 60 MG tablet   90 tablet 1 12/1/2023     Last Office Visit : 10-3-2024  Future Office visit:  10-    Calcium Channel Blockers Protocol  Slehet96/03/2024 12:17 PM   Protocol Details Blood pressure under 140/90 in past 12 months      10/3/2024  10:39 AM   Vital Signs    Systolic 154 !    Diastolic 95 (H)    Pulse 75        Creatinine   Date Value Ref Range Status   09/24/2024 0.85 0.51 - 0.95 mg/dL Final   07/20/2016 0.74 0.52 - 1.04 mg/dL Final     Lab Results   Component Value Date    ALT 14 09/24/2024    ALT 21 07/20/2016

## 2024-10-04 NOTE — PROGRESS NOTES
Date: 10/4/2024    Time of Call: 2:26 PM     Diagnosis:  s/p TPVR     [ TORB ] Ordering provider: Neil Wilkins MD    Order: 3 day zio     Order received by: Sadie Hurd RN       Follow-up/additional notes: updated pt

## 2024-10-10 RX ORDER — CYCLOBENZAPRINE HCL 5 MG
5-10 TABLET ORAL PRN
COMMUNITY
Start: 2024-10-10

## 2024-10-10 RX ORDER — LOSARTAN POTASSIUM 50 MG/1
50 TABLET ORAL 2 TIMES DAILY
Qty: 180 TABLET | Refills: 3 | Status: SHIPPED | OUTPATIENT
Start: 2024-10-10

## 2024-10-10 RX ORDER — LOSARTAN POTASSIUM 50 MG/1
50 TABLET ORAL DAILY
Qty: 90 TABLET | Refills: 3 | Status: SHIPPED | OUTPATIENT
Start: 2024-10-10 | End: 2024-10-10

## 2024-10-10 NOTE — TELEPHONE ENCOUNTER
Date: 10/10/2024    Time of Call: 12:36 PM     Diagnosis:  htn     [ TORB ] Ordering provider: Neil Wilkins MD    Order: Just talked to anh,   Travis returned Tuesday so we do not have it yet.   Please increase Losartan to 50 mg twice daily, advised  plenty of water.  DO not take diltiazem,  let us know if palpitations worsen or new symptoms.   Please keep eyes open for Holter.   Ileana or Sadie can you please put RX in for Losartan 50 mg twice daily   Neil Gill      Order received by: Sadie Hurd RN    Follow-up/additional notes: sent to pharmacy

## 2024-10-17 ENCOUNTER — MYC MEDICAL ADVICE (OUTPATIENT)
Dept: PEDIATRIC CARDIOLOGY | Facility: CLINIC | Age: 34
End: 2024-10-17
Payer: COMMERCIAL

## 2024-10-17 DIAGNOSIS — Q21.3 TOF (TETRALOGY OF FALLOT): Primary | ICD-10-CM

## 2024-10-17 DIAGNOSIS — Q21.3 TETRALOGY OF FALLOT: ICD-10-CM

## 2024-10-17 DIAGNOSIS — I10 HYPERTENSION, UNSPECIFIED TYPE: ICD-10-CM

## 2024-10-18 ENCOUNTER — MYC REFILL (OUTPATIENT)
Dept: CARDIOLOGY | Facility: CLINIC | Age: 34
End: 2024-10-18
Payer: COMMERCIAL

## 2024-10-18 DIAGNOSIS — I10 ESSENTIAL HYPERTENSION: ICD-10-CM

## 2024-10-18 DIAGNOSIS — Q21.3 TETRALOGY OF FALLOT: ICD-10-CM

## 2024-10-18 PROCEDURE — 93248 EXT ECG>7D<15D REV&INTERPJ: CPT | Performed by: PEDIATRICS

## 2024-10-21 RX ORDER — ADHESIVE BANDAGE 3/4"
BANDAGE TOPICAL
Qty: 1 EACH | Refills: 0 | Status: SHIPPED | OUTPATIENT
Start: 2024-10-21

## 2024-10-25 ENCOUNTER — OFFICE VISIT (OUTPATIENT)
Dept: CARDIOLOGY | Facility: CLINIC | Age: 34
End: 2024-10-25
Attending: STUDENT IN AN ORGANIZED HEALTH CARE EDUCATION/TRAINING PROGRAM
Payer: COMMERCIAL

## 2024-10-25 VITALS
WEIGHT: 200.5 LBS | BODY MASS INDEX: 33 KG/M2 | DIASTOLIC BLOOD PRESSURE: 91 MMHG | HEART RATE: 75 BPM | OXYGEN SATURATION: 100 % | SYSTOLIC BLOOD PRESSURE: 134 MMHG

## 2024-10-25 DIAGNOSIS — Z95.4 S/P TRANSCATHETER REPLACEMENT OF PULMONARY VALVE: ICD-10-CM

## 2024-10-25 DIAGNOSIS — I51.7 ENLARGED RV (RIGHT VENTRICLE): ICD-10-CM

## 2024-10-25 DIAGNOSIS — Q24.9 ADULT CONGENITAL HEART DISEASE: ICD-10-CM

## 2024-10-25 DIAGNOSIS — Q21.3 TETRALOGY OF FALLOT: Primary | ICD-10-CM

## 2024-10-25 DIAGNOSIS — Q21.3 TETRALOGY OF FALLOT: ICD-10-CM

## 2024-10-25 DIAGNOSIS — I47.10 NONSUSTAINED SUPRAVENTRICULAR TACHYCARDIA (H): ICD-10-CM

## 2024-10-25 DIAGNOSIS — Q22.2 CONGENITAL PULMONARY VALVE INSUFFICIENCY: ICD-10-CM

## 2024-10-25 DIAGNOSIS — I37.1 PULMONARY VALVE INSUFFICIENCY, UNSPECIFIED ETIOLOGY: ICD-10-CM

## 2024-10-25 PROCEDURE — 99214 OFFICE O/P EST MOD 30 MIN: CPT | Mod: 25 | Performed by: STUDENT IN AN ORGANIZED HEALTH CARE EDUCATION/TRAINING PROGRAM

## 2024-10-25 PROCEDURE — 93320 DOPPLER ECHO COMPLETE: CPT | Performed by: PEDIATRICS

## 2024-10-25 PROCEDURE — 93325 DOPPLER ECHO COLOR FLOW MAPG: CPT | Performed by: PEDIATRICS

## 2024-10-25 PROCEDURE — 93303 ECHO TRANSTHORACIC: CPT | Performed by: PEDIATRICS

## 2024-10-25 PROCEDURE — G0463 HOSPITAL OUTPT CLINIC VISIT: HCPCS | Performed by: STUDENT IN AN ORGANIZED HEALTH CARE EDUCATION/TRAINING PROGRAM

## 2024-10-25 PROCEDURE — 93005 ELECTROCARDIOGRAM TRACING: CPT

## 2024-10-25 NOTE — PATIENT INSTRUCTIONS
"Thank you for visiting the Adult Cong[facility]ital and Cardiovascular Genetics Clinic at the [facility].    Cardiology Providers you saw during your visit:  EFE Parks MD    Diagnosis:  s/p TPVR    Results:  EFE Parks MD reviewed the results of your EKG and echocardiogram testing today in clinic.    If you have questions or concerns, please call us at [phone] or contact us through [employer].  ______________________________________________________________________________    Recommendations from your Cardiology Provider TODAY:    Continue to monitor your blood pressures and send them in a week or 2  Cardiac rehab referral, if you run into issues let me know and I can place a physical therapy referral instead  Complete a lab draw to check electrolytes in the next week when able      ____________________________________________________________________________________________________    Follow-up Plan:  Follow up with Dr Wilkins in 6 months with an echo prior    ____________________________________________________________________________________________________    If you have questions or concerns, please call us at [phone] or contact us through [employer]. Our fax number is [phone]    Sadie Hurd RN RN, BSN   Yareli Hough (Scheduling)  Nurse Care Coordinator     Clinic   Adult Congenital and CV Genetics              Adult Congenital and CV Genetics  [facility]              [facility]        For after hours urgent needs, call [phone] and ask to speak to the \"On-Call Cardiologist.\"    For emergencies call 911.      ____________________________________________________________________________________________________    Additional Important Information for Your Heart Health      General Cardiac Recommendations:  Continue to eat a heart healthy, low salt diet.  Continue to get 20-30 minutes of aerobic activity, " 4-5 days per week.  Examples of aerobic activity include walking, running, swimming, cycling, etc.  Continue to observe good oral hygiene, with regular dental visits.        SBE prophylaxis (antibiotics needed before dental appointments):   Yes__X__  No____    SBE prophylaxis applies to patients with certain heart conditions who are recommended to take antibiotics before dental appointments and other specific procedures. These antibiotics are to help prevent an infection of the heart (endocarditis) that certain patients are at higher risk of developing. The guidelines used come from the American Heart Association and are periodically updated.    If YES is checked, follow the recommendations outlined below:  Take antibiotic(s) prior to recommended dental procedures and procedures involving the respiratory tract or procedures involving infections of the skin, muscle or bones.   SBE prophylaxis is not needed for routine gastrointestinal and genitourinary procedures (ie. Colonoscopy or vaginal delivery)  Observe good oral hygiene daily, as advised by your dentist. Get regular professional dental care.  Keep cuts and other open injuries clean.  All infections should be treated as soon as possible.  Symptoms of Infective Endocarditis could include: fever lasting more than 4-5 days or a recurrent fever that initially resolves but returns within 1-2 days). Call us a [phone] if you are experiencing these symptoms.        FASTING CHOLESTEROL was checked in the last 5 years YES__X__  NO____ (2023)  If no, please follow up with your primary care physician. You should have a cholesterol screening every 5 years at minimum, and every year if taking a medication for your cholesterol levels.

## 2024-10-25 NOTE — PROGRESS NOTES
Assessment:   In summary, Brittani Gaviria has repaired Tetralogy of Fallot and recently underwent transcatheter pulmonary valve replacement due to exercise intolerance and moderate pulmonary insufficiency. She is recovering well from the procedure. She had hypertension post-procedure and recently increased her losartan to BID. Her BP's by report are better on this dose. She will send a log of her blood pressures so we can titrate her medications. I will check a BMP to monitor her potassium.       Plan:  Anatomic class: II  Physiologic class: B for mild PS and RV dilation  AHA Guideline testing status:  CPX: Last 11/2023; Due consider 1-2 years after TPVR  Holter: Last 9/2024, Due consider in 1-2 years  MRI/CT: Last 6/2024 (pre PVR), Due TBD based on clinical course    Tetralogy of Fallot:  Repair type: Transannular patch followed by PVR  Pulmonary valve: 26mm Myriam 3 implanted in 25mm RV-PA homograft  Stenosis: mild  Regurgitation: trivial  RVOT Obstruction: no  RV size/function: normal function, mildly enlarged echo 10/25/2024   Branch PA's: no obstruction at cath  Tricuspid valve: trivial regurgitation  Aortic valve: trivial regurgitation  Aortic root: Shows expected dilation, no indication for intervention  Arrhythmia:  Atrial: SVT  Ventricular repair: Non-sustained VT  SCD risk: low  Khairy score: 2  ICD discussed: no, has seen Dr. Valdes  Plan: No changes. Continue current medications. Repeat echo in 6 months.    2. Hypertension: Continue losartan 50 BID, send BP log for medication adjustment. Check BMP today.    3. SVT: Off diltiazem. Repeat zio in next 1-2 years or PRN.    4. Non-sustained VT: negative EP study.  Repeat zio in next 1-2 years or PRN.      Cardiac Meds:  Losartan 50 mg PO BID  Spironolactone 25 mg PO daily  ASA 81 mg daily    Follow up: 6 months  Testing at time of follow up: congenital echo  Endocarditis prophylaxis indicated: yes  Activity Restrictions: No  Special notes for PCP/ER  "personnel: culture for fevers due to endocarditis risk    Dr. EFE Parks  Adult Congenital Heart Disease    I spent 35 minutes during this visit reviewing the chart, performing a history and physical examination, counseling the patient and documenting the encounter.      ____________________    CC: TOF    HPI:  I had the pleasure of seeing Brittani Gaviria  in the [facility] clinic in consultation for her history of congenital heart disease. As you know she was born with Tetralogy of Fallot.  Her complete cardiac history is listed below.  Briefly underwent an initial transannular patch repair. She then had 2 subsequent surgical pulmonary valve replacements, the last of which was a 25mm RV-PA homograft. In follow up she developed pulmonary insufficiency and exercise intolerance. She underwent transcatheter PVR with a 26mm Myriam 3 valve on 9/23/24. The procedure was uncomplicated.    She has a history of SVT and non-sustained VT. She had a negative EP study prior to transcatheter pulmonary valve replacement. She had been on diltiazem prior to the procedure but this was stopped after her TPVR.  A follow up holter showed non-sustained SVT and rare PVC's.     Brittani has a history of hypertension and was on losartan pre-procedure. Post-procedure she had SBPs in the 160's. Dr. Wilkins increased her losartan to BID and this has improved her blood pressures.     Brittani comes today for a follow up visit. She is overall doing well. She is just now beginning to return to normal activity. Her home BP's have been 120-130 on losartan BID. She has not yet taken her morning dose of losartan. She denies chest pain, palpitations, orthopnea, edema, exercise intolerance, near or nannette syncope.       Congenital Cardiac History  Initial Diagnosis: Tetralogy of Fallot  Tetralogy of Fallot  11 months: Complete repair with transannular patch  Age 7: \"Valve repair\" (Kalani)  7/31/2001: PVR with 25mm RV-PA " "homograft (Kalani, Dr. Yue Johnson and Dr. Johnson)  23: TPVR with 26mm Myriam 3  Current: Mild PS (echo 10/25/2024), trivial PI  No Branch PA stenosis  Normal RV function  Mild RV dilation echo 10/25/2024       Surgical/Transcatheter Interventions  11 months: Complete repair with transannular patch  Age 7: \"Valve repair\" (Kalani)  2001: PVR with 25mm RV-PA homograft (Kalani, Dr. Yue Johnson and Dr. Johnson)  23: TPVR with 26mm Myriam 3: Hemodynamics pre-valve: RA 11, RV 36/10, PA , RPA 20, LPA , PCWP 11, /14, PA sat 82%, PVR 1.43 (iPVR 2.8), CI: 3.5 (VO2 102, Hg 14, HR 78)    Arrhythmia issues:  NSVT  SVT: On dilitazem  24: EP study- no inducible VT    Acquired heart disease:  Coronary disease: no  Endocarditis: no but at risk due to PVR  Pulmonary Hypertension: no  Hypertension: yes, on losartan  High Cholesterol: no  Heart Failure: NYHA I, preserved EF    Other:   Hyperpar[place] s/p [place] [facility] [place]  2. 4 pregnancies with c/s for last child  3. Anemia  4. SP ventral hernia repair, Oct 2022  5. Biliary dyskinesia S/P lap cholecystectomy 2014      Past Medical History:   Diagnosis Date    Carpal tunnel syndrome     right, conservative management     Chlamydia     Cholelithiasis 2014    resolved    Hyperparathyroidism (H) 2016    Iron deficiency anemia 2015    Kidney stones     Migraines     Other and unspecified ovarian cyst     Rh sensitization 2016    Tetralogy of Fallot     surgical correction     Past Surgical History:   Procedure Laterality Date    CARDIAC SURGERY      x 3 @ ages , 11 months, 13 years     SECTION      x 2    CHOLECYSTECTOMY, LAPOROSCOPIC  2014    Cholecystectomy, Laparoscopic    EP COMPREHENSIVE EP STUDY N/A 2024    Procedure: EP Comprehensive EP Study;  Surgeon: Casper Valdes MD;  Location:  HEART CARDIAC CATH LAB    PEDS HEART CATHETERIZATION N/A 2024    Procedure: Heart " Catheterization, Transcatheter pulmonary valve replacement;  Surgeon: Johnathon Fraire MD;  Location: [facility] CARDIAC CATH LAB     Family History   Problem Relation Age of Onset    Diabetes Maternal Grandmother     Hypertension Father     Hypertension Sister     Multiple Sclerosis Mother     Cerebrovascular Disease Mother     Coronary Artery Disease No family hx of     Depression No family hx of     Anxiety Disorder No family hx of     Anesthesia Reaction No family hx of     Cancer No family hx of     Thyroid Disease No family hx of     Glaucoma No family hx of     Macular Degeneration No family hx of      Social History     Tobacco Use    Smoking status: Never     Passive exposure: Never    Smokeless tobacco: Never   Substance Use Topics    Alcohol use: No     Alcohol/week: 0.0 standard drinks of alcohol    Drug use: No         Current Outpatient Medications:     acetaminophen (TYLENOL) 325 MG tablet, Take 325-650 mg by mouth as needed., Disp: , Rfl:     amoxicillin (AMOXIL) 500 MG capsule, TAKE FOUR CAPSULES BY MOUTH 1 HOUR BEFORE DENTAL PROCEDURE, Disp: 12 capsule, Rfl: 3    aspirin 81 MG EC tablet, Take 81 mg by mouth daily, Disp: , Rfl:     benzoyl peroxide 5 % external liquid, Apply topically as needed., Disp: , Rfl:     Blood Pressure Monitoring (BLOOD PRESSURE CUFF) MISC, Please check blood pressures as needed., Disp: 1 each, Rfl: 0    cholecalciferol (VITAMIN D3) 125 mcg (5000 units) capsule, Take 1 capsule by mouth daily, Disp: , Rfl:     clindamycin (CLEOCIN T) 1 % external solution, Apply topically as needed., Disp: , Rfl:     cyclobenzaprine (FLEXERIL) 5 MG tablet, Take 1-2 tablets (5-10 mg) by mouth as needed for muscle spasms., Disp: , Rfl:     fluticasone (FLONASE) 50 MCG/ACT nasal spray, 2 sprays, Disp: , Rfl:     losartan (COZAAR) 50 MG tablet, Take 1 tablet (50 mg) by mouth 2 times daily., Disp: 180 tablet, Rfl: 3    ondansetron (ZOFRAN ODT) 4 MG ODT tab, 1 to 2 tabs by mouth up to  twice a day as needed for nausea symptoms related to migraine. Max 9 days per month., Disp: , Rfl:     RETIN-A 0.025 % external cream, Apply topically as needed., Disp: , Rfl:     spironolactone (ALDACTONE) 25 MG tablet, Take 1 tablet (25 mg) by mouth daily, Disp: 90 tablet, Rfl: 3    SUMAtriptan (IMITREX) 50 MG tablet, Take 50 mg by mouth, Disp: , Rfl:     VENTOLIN  (90 Base) MCG/ACT inhaler, , Disp: , Rfl:      Allergies   Allergen Reactions    Blood Transfusion Related (Informational Only) Other (See Comments)     Patient has a history of a clinically significant antibody against RBC antigens.  A delay in compatible RBCs may occur.    Ketorolac Hives, Itching and Unknown     Tolerates ibuprofen without reaction      Ferumoxytol Difficulty breathing, Cramps, Hives, Itching and Muscle Pain (Myalgia)         BP (!) 134/91 (BP Location: Right arm, Patient Position: Chair, Cuff Size: Adult Regular)   Pulse 75   Wt 90.9 kg (200 lb 8 oz)   SpO2 100%   BMI 33.00 kg/m    Physical Exam  Vitals reviewed.   Constitutional:       General: She is not in acute distress.     Appearance: Normal appearance. She is not ill-appearing, toxic-appearing or diaphoretic.   HENT:      Head: Normocephalic and atraumatic.      Right Ear: External ear normal.      Left Ear: External ear normal.      Nose: Nose normal. No congestion or rhinorrhea.      Mouth/Throat:      Mouth: Mucous membranes are moist.   Eyes:      General: No scleral icterus.        Right eye: No discharge.         Left eye: No discharge.      Conjunctiva/sclera: Conjunctivae normal.   Cardiovascular:      Comments: Well healed sternotomy. Normal S1/split S2. Gr II/VI systolic ejection murmur. No rubs/gallops. Distal perfusion normal.   Pulmonary:      Effort: Pulmonary effort is normal.      Breath sounds: Normal breath sounds. No wheezing or rales.   Abdominal:      General: Abdomen is flat. Bowel sounds are normal. There is no distension.      Palpations:  Abdomen is soft.   Musculoskeletal:         General: No deformity.      Cervical back: Normal range of motion.      Right lower leg: No edema.      Left lower leg: No edema.   Skin:     General: Skin is warm.      Capillary Refill: Capillary refill takes less than 2 seconds.   Neurological:      General: No focal deficit present.      Mental Status: She is alert. Mental status is at baseline.   Psychiatric:         Mood and Affect: Mood normal.           I personally reviewed the following studies and have given my interpretation below:    EKG:10/25/2024 normal sinus rhythm, non-specific IVCD, PAC    Echo: 10/25/2024 (my review)  Pulmonary valve is well seated. There is a peak velocity of 2.2 m/s with peak gradient of 20-22mmHg. There is some regurgitation that is not well profiled, and has previously been felt to be paravalvar. The RV has normal function and is mildly dilated. Trivial AI. Normal LV function. No effusion. Inadequate TR to estimate RV pressure    Previous pulmonary valve gradients:  10/3 Peak 24, mean 12  9/24 Peak 21 Mean 12, mild paravalvar leak, RVSP 28    Zio 9/2024  - Patient had a min HR of 58 bpm, max HR of 211 bpm, and avg HR of 81 bpm. Predominant underlying rhythm was Sinus Rhythm. Bundle Branch Block/IVCD was present.   - 14 Supraventricular Tachycardia runs occurred, the run with the fastest interval lasting 8 beats with a max rate of 211 bpm, the longest lasting 13 beats  - Isolated SVEs were occasional (2.4%, [id number]), SVE Couplets were rare (<1.0%, 172), and SVE Triplets were rare (<1.0%, 18).   - Isolated VEs were rare (<1.0%, 1331), VE Couplets were rare (<1.0%, 1), and VE Triplets were rare (<1.0%, 1).

## 2024-10-25 NOTE — LETTER
10/25/2024      RE: Brittani Gaviria  [address]  [place] [address]       Dear Colleague,    Thank you for the opportunity to participate in the care of your patient, Brittani Gaviria, at the [facility] [place] [facility]. Please see a copy of my visit note below.    Assessment:   In summary, Brittani Gaviria has repaired Tetralogy of Fallot and recently underwent transcatheter pulmonary valve replacement due to exercise intolerance and moderate pulmonary insufficiency. She is recovering well from the procedure. She had hypertension post-procedure and recently increased her losartan to BID. Her BP's by report are better on this dose. She will send a log of her blood pressures so we can titrate her medications. I will check a BMP to monitor her potassium.       Plan:  Anatomic class: II  Physiologic class: B for mild PS and RV dilation  AHA Guideline testing status:  CPX: Last 11/2023; Due consider 1-2 years after TPVR  Holter: Last 9/2024, Due consider in 1-2 years  MRI/CT: Last 6/2024 (pre PVR), Due TBD based on clinical course    Tetralogy of Fallot:  Repair type: Transannular patch followed by PVR  Pulmonary valve: 26mm Myriam 3 implanted in 25mm RV-PA homograft  Stenosis: mild  Regurgitation: trivial  RVOT Obstruction: no  RV size/function: normal function, mildly enlarged echo 10/25/2024   Branch PA's: no obstruction at cath  Tricuspid valve: trivial regurgitation  Aortic valve: trivial regurgitation  Aortic root: Shows expected dilation, no indication for intervention  Arrhythmia:  Atrial: SVT  Ventricular repair: Non-sustained VT  SCD risk: low  Khairy score: 2  ICD discussed: no, has seen Dr. Valdes  Plan: No changes. Continue current medications. Repeat echo in 6 months.    2. Hypertension: Continue losartan 50 BID, send BP log for medication adjustment. Check BMP today.    3. SVT: Off diltiazem. Repeat zio in next 1-2  years or PRN.    4. Non-sustained VT: negative EP study.  Repeat zio in next 1-2 years or PRN.      Cardiac Meds:  Losartan 50 mg PO BID  Spironolactone 25 mg PO daily  ASA 81 mg daily    Follow up: 6 months  Testing at time of follow up: congenital echo  Endocarditis prophylaxis indicated: yes  Activity Restrictions: No  Special notes for PCP/ER personnel: culture for fevers due to endocarditis risk    Dr. EFE Parks  Adult Congenital Heart Disease    I spent 35 minutes during this visit reviewing the chart, performing a history and physical examination, counseling the patient and documenting the encounter.      ____________________    CC: TOF    HPI:  I had the pleasure of seeing Brittani Gaviria  in the [facility] clinic in consultation for her history of congenital heart disease. As you know she was born with Tetralogy of Fallot.  Her complete cardiac history is listed below.  Briefly underwent an initial transannular patch repair. She then had 2 subsequent surgical pulmonary valve replacements, the last of which was a 25mm RV-PA homograft. In follow up she developed pulmonary insufficiency and exercise intolerance. She underwent transcatheter PVR with a 26mm Myriam 3 valve on 9/23/24. The procedure was uncomplicated.    She has a history of SVT and non-sustained VT. She had a negative EP study prior to transcatheter pulmonary valve replacement. She had been on diltiazem prior to the procedure but this was stopped after her TPVR.  A follow up holter showed non-sustained SVT and rare PVC's.     Brittani has a history of hypertension and was on losartan pre-procedure. Post-procedure she had SBPs in the 160's. Dr. Wilkins increased her losartan to BID and this has improved her blood pressures.     Brittani comes today for a follow up visit. She is overall doing well. She is just now beginning to return to normal activity. Her home BP's have been 120-130 on losartan BID. She has not yet  "taken her morning dose of losartan. She denies chest pain, palpitations, orthopnea, edema, exercise intolerance, near or nannette syncope.       Congenital Cardiac History  Initial Diagnosis: Tetralogy of Fallot  Tetralogy of Fallot  11 months: Complete repair with transannular patch  Age 7: \"Valve repair\" (Kalani)  7/31/2001: PVR with 25mm RV-PA homograft (Dr. Yue Uriarte and Dr. Johnson)  9/23/24: TPVR with 26mm Myriam 3  Current: Mild PS (echo 10/25/2024), trivial PI  No Branch PA stenosis  Normal RV function  Mild RV dilation echo 10/25/2024       Surgical/Transcatheter Interventions  11 months: Complete repair with transannular patch  Age 7: \"Valve repair\" (Kalani)  7/31/2001: PVR with 25mm RV-PA homograft (Dr. Yue Uriarte and Dr. Johnson)  9/23/24: TPVR with 26mm Myriam 3: Hemodynamics pre-valve: RA 12/12/11, RV 36/10, PA 30/14/21, RPA 28/12/20, LPA 31/14/21, PCWP 11, /14, PA sat 82%, PVR 1.43 (iPVR 2.8), CI: 3.5 (VO2 102, Hg 14, HR 78)    Arrhythmia issues:  NSVT  SVT: On dilitazem  7/1/24: EP study- no inducible VT    Acquired heart disease:  Coronary disease: no  Endocarditis: no but at risk due to PVR  Pulmonary Hypertension: no  Hypertension: yes, on losartan  High Cholesterol: no  Heart Failure: NYHA I, preserved EF    Other:   [place] [facility] [place]. 4 pregnancies with c/s for last child  3. Anemia  4. SP ventral hernia repair, Oct 2022  5. Biliary dyskinesia S/P lap cholecystectomy 5/16/2014      Past Medical History:   Diagnosis Date     Carpal tunnel syndrome     right, conservative management      Chlamydia 2011     Cholelithiasis 2014    resolved     Hyperparathyroidism (H) 7/21/2016     Iron deficiency anemia 2015     Kidney stones      Migraines      Other and unspecified ovarian cyst      Rh sensitization 7/21/2016     Tetralogy of Fallot     surgical correction     Past Surgical History:   Procedure Laterality Date     CARDIAC SURGERY      x " 3 @ ages , 11 months, 13 years      SECTION      x 2     CHOLECYSTECTOMY, LAPOROSCOPIC      Cholecystectomy, Laparoscopic     EP COMPREHENSIVE EP STUDY N/A 2024    Procedure: EP Comprehensive EP Study;  Surgeon: Casper Valdes MD;  Location:  HEART CARDIAC CATH LAB     PEDS HEART CATHETERIZATION N/A 2024    Procedure: Heart Catheterization, Transcatheter pulmonary valve replacement;  Surgeon: Johnathon Fraire MD;  Location:  HEART PEDS CARDIAC CATH LAB     Family History   Problem Relation Age of Onset     Diabetes Maternal Grandmother      Hypertension Father      Hypertension Sister      Multiple Sclerosis Mother      Cerebrovascular Disease Mother      Coronary Artery Disease No family hx of      Depression No family hx of      Anxiety Disorder No family hx of      Anesthesia Reaction No family hx of      Cancer No family hx of      Thyroid Disease No family hx of      Glaucoma No family hx of      Macular Degeneration No family hx of      Social History     Tobacco Use     Smoking status: Never     Passive exposure: Never     Smokeless tobacco: Never   Substance Use Topics     Alcohol use: No     Alcohol/week: 0.0 standard drinks of alcohol     Drug use: No         Current Outpatient Medications:      acetaminophen (TYLENOL) 325 MG tablet, Take 325-650 mg by mouth as needed., Disp: , Rfl:      amoxicillin (AMOXIL) 500 MG capsule, TAKE FOUR CAPSULES BY MOUTH 1 HOUR BEFORE DENTAL PROCEDURE, Disp: 12 capsule, Rfl: 3     aspirin 81 MG EC tablet, Take 81 mg by mouth daily, Disp: , Rfl:      benzoyl peroxide 5 % external liquid, Apply topically as needed., Disp: , Rfl:      Blood Pressure Monitoring (BLOOD PRESSURE CUFF) MISC, Please check blood pressures as needed., Disp: 1 each, Rfl: 0     cholecalciferol (VITAMIN D3) 125 mcg (5000 units) capsule, Take 1 capsule by mouth daily, Disp: , Rfl:      clindamycin (CLEOCIN T) 1 % external solution, Apply topically as needed., Disp: ,  Rfl:      cyclobenzaprine (FLEXERIL) 5 MG tablet, Take 1-2 tablets (5-10 mg) by mouth as needed for muscle spasms., Disp: , Rfl:      fluticasone (FLONASE) 50 MCG/ACT nasal spray, 2 sprays, Disp: , Rfl:      losartan (COZAAR) 50 MG tablet, Take 1 tablet (50 mg) by mouth 2 times daily., Disp: 180 tablet, Rfl: 3     ondansetron (ZOFRAN ODT) 4 MG ODT tab, 1 to 2 tabs by mouth up to twice a day as needed for nausea symptoms related to migraine. Max 9 days per month., Disp: , Rfl:      RETIN-A 0.025 % external cream, Apply topically as needed., Disp: , Rfl:      spironolactone (ALDACTONE) 25 MG tablet, Take 1 tablet (25 mg) by mouth daily, Disp: 90 tablet, Rfl: 3     SUMAtriptan (IMITREX) 50 MG tablet, Take 50 mg by mouth, Disp: , Rfl:      VENTOLIN  (90 Base) MCG/ACT inhaler, , Disp: , Rfl:      Allergies   Allergen Reactions     Blood Transfusion Related (Informational Only) Other (See Comments)     Patient has a history of a clinically significant antibody against RBC antigens.  A delay in compatible RBCs may occur.     Ketorolac Hives, Itching and Unknown     Tolerates ibuprofen without reaction       Ferumoxytol Difficulty breathing, Cramps, Hives, Itching and Muscle Pain (Myalgia)         BP (!) 134/91 (BP Location: Right arm, Patient Position: Chair, Cuff Size: Adult Regular)   Pulse 75   Wt 90.9 kg (200 lb 8 oz)   SpO2 100%   BMI 33.00 kg/m    Physical Exam  Vitals reviewed.   Constitutional:       General: She is not in acute distress.     Appearance: Normal appearance. She is not ill-appearing, toxic-appearing or diaphoretic.   HENT:      Head: Normocephalic and atraumatic.      Right Ear: External ear normal.      Left Ear: External ear normal.      Nose: Nose normal. No congestion or rhinorrhea.      Mouth/Throat:      Mouth: Mucous membranes are moist.   Eyes:      General: No scleral icterus.        Right eye: No discharge.         Left eye: No discharge.      Conjunctiva/sclera: Conjunctivae  normal.   Cardiovascular:      Comments: Well healed sternotomy. Normal S1/split S2. Gr II/VI systolic ejection murmur. No rubs/gallops. Distal perfusion normal.   Pulmonary:      Effort: Pulmonary effort is normal.      Breath sounds: Normal breath sounds. No wheezing or rales.   Abdominal:      General: Abdomen is flat. Bowel sounds are normal. There is no distension.      Palpations: Abdomen is soft.   Musculoskeletal:         General: No deformity.      Cervical back: Normal range of motion.      Right lower leg: No edema.      Left lower leg: No edema.   Skin:     General: Skin is warm.      Capillary Refill: Capillary refill takes less than 2 seconds.   Neurological:      General: No focal deficit present.      Mental Status: She is alert. Mental status is at baseline.   Psychiatric:         Mood and Affect: Mood normal.           I personally reviewed the following studies and have given my interpretation below:    EKG:10/25/2024 normal sinus rhythm, non-specific IVCD, PAC    Echo: 10/25/2024 (my review)  Pulmonary valve is well seated. There is a peak velocity of 2.2 m/s with peak gradient of 20-22mmHg. There is some regurgitation that is not well profiled, and has previously been felt to be paravalvar. The RV has normal function and is mildly dilated. Trivial AI. Normal LV function. No effusion. Inadequate TR to estimate RV pressure    Previous pulmonary valve gradients:  10/3 Peak 24, mean 12  9/24 Peak 21 Mean 12, mild paravalvar leak, RVSP 28    Zio 9/2024  - Patient had a min HR of 58 bpm, max HR of 211 bpm, and avg HR of 81 bpm. Predominant underlying rhythm was Sinus Rhythm. Bundle Branch Block/IVCD was present.   - 14 Supraventricular Tachycardia runs occurred, the run with the fastest interval lasting 8 beats with a max rate of 211 bpm, the longest lasting 13 beats  - Isolated SVEs were occasional (2.4%, 92319), SVE Couplets were rare (<1.0%, 172), and SVE Triplets were rare (<1.0%, 18).   -  Isolated VEs were rare (<1.0%, 1331), VE Couplets were rare (<1.0%, 1), and VE Triplets were rare (<1.0%, 1).    Please do not hesitate to contact me if you have any questions/concerns.     Sincerely,     Olga Parks MD

## 2024-10-28 LAB
ATRIAL RATE - MUSE: 77 BPM
DIASTOLIC BLOOD PRESSURE - MUSE: NORMAL MMHG
INTERPRETATION ECG - MUSE: NORMAL
P AXIS - MUSE: 11 DEGREES
PR INTERVAL - MUSE: 118 MS
QRS DURATION - MUSE: 134 MS
QT - MUSE: 420 MS
QTC - MUSE: 475 MS
R AXIS - MUSE: 85 DEGREES
SYSTOLIC BLOOD PRESSURE - MUSE: NORMAL MMHG
T AXIS - MUSE: 42 DEGREES
VENTRICULAR RATE- MUSE: 77 BPM

## 2024-10-30 ENCOUNTER — MYC MEDICAL ADVICE (OUTPATIENT)
Dept: CARDIOLOGY | Facility: CLINIC | Age: 34
End: 2024-10-30
Payer: COMMERCIAL

## 2024-10-31 ENCOUNTER — LAB (OUTPATIENT)
Dept: LAB | Facility: CLINIC | Age: 34
End: 2024-10-31
Payer: COMMERCIAL

## 2024-10-31 DIAGNOSIS — Z95.4 S/P TRANSCATHETER REPLACEMENT OF PULMONARY VALVE: ICD-10-CM

## 2024-10-31 DIAGNOSIS — Q24.9 ADULT CONGENITAL HEART DISEASE: ICD-10-CM

## 2024-10-31 DIAGNOSIS — Q21.3 TETRALOGY OF FALLOT: ICD-10-CM

## 2024-10-31 LAB
ANION GAP SERPL CALCULATED.3IONS-SCNC: 11 MMOL/L (ref 7–15)
BUN SERPL-MCNC: 10.8 MG/DL (ref 6–20)
CALCIUM SERPL-MCNC: 9.9 MG/DL (ref 8.8–10.4)
CHLORIDE SERPL-SCNC: 108 MMOL/L (ref 98–107)
CREAT SERPL-MCNC: 0.88 MG/DL (ref 0.51–0.95)
EGFRCR SERPLBLD CKD-EPI 2021: 87 ML/MIN/1.73M2
GLUCOSE SERPL-MCNC: 113 MG/DL (ref 70–99)
HCO3 SERPL-SCNC: 22 MMOL/L (ref 22–29)
POTASSIUM SERPL-SCNC: 3.8 MMOL/L (ref 3.4–5.3)
SODIUM SERPL-SCNC: 141 MMOL/L (ref 135–145)

## 2024-10-31 PROCEDURE — 36415 COLL VENOUS BLD VENIPUNCTURE: CPT | Performed by: PATHOLOGY

## 2024-10-31 PROCEDURE — 80048 BASIC METABOLIC PNL TOTAL CA: CPT | Performed by: PATHOLOGY

## 2024-11-01 ENCOUNTER — TELEPHONE (OUTPATIENT)
Dept: PEDIATRIC CARDIOLOGY | Facility: CLINIC | Age: 34
End: 2024-11-01

## 2024-11-08 ENCOUNTER — ALLIED HEALTH/NURSE VISIT (OUTPATIENT)
Dept: PEDIATRIC CARDIOLOGY | Facility: CLINIC | Age: 34
End: 2024-11-08
Payer: COMMERCIAL

## 2024-11-08 VITALS — SYSTOLIC BLOOD PRESSURE: 156 MMHG | HEART RATE: 75 BPM | DIASTOLIC BLOOD PRESSURE: 91 MMHG

## 2024-11-08 DIAGNOSIS — Z01.30 BP CHECK: Primary | ICD-10-CM

## 2024-11-08 NOTE — PROGRESS NOTES
Brittani Gaviria was evaluated at [facility] on November 8, 2024 at which time her blood pressure was:    BP Readings from Last 1 Encounters:   11/08/24 (!) 156/91     No data recorded      Reviewed lifestyle modifications for blood pressure control and reduction: including making healthy food choices, managing weight, getting regular exercise, smoking cessation, reducing alcohol consumption, monitoring blood pressure regularly.     Symptoms: None    BP Goal:< 140/90 mmHg    BP Assessment:  BP too high    Potential Reasons for BP too high: NA - Not applicable    BP Follow-Up Plan: Recheck BP in 30 days in the pharmacy.Date of next scheduled BP visit or call: 12/8/2024    Recommendation to Provider: BP high. Meds were recently adjusted last month    Note completed by: Dee De Los Santos RPH     Continued monitoring on current medications.     Will schedule clinic follow up    Neil Wilkins M.D.   of Pediatrics  Pediatric and Adult Congenital Cardiology  [facility]  Pediatric Cardiology Office [phone]  Adult Congenital Cardiology Triage and Scheduling [phone]

## 2024-11-13 ENCOUNTER — TELEPHONE (OUTPATIENT)
Dept: PEDIATRIC CARDIOLOGY | Facility: CLINIC | Age: 34
End: 2024-11-13
Payer: COMMERCIAL

## 2024-11-13 ENCOUNTER — MYC MEDICAL ADVICE (OUTPATIENT)
Dept: CARDIOLOGY | Facility: CLINIC | Age: 34
End: 2024-11-13
Payer: COMMERCIAL

## 2024-11-13 DIAGNOSIS — R10.31 RIGHT GROIN PAIN: ICD-10-CM

## 2024-11-13 DIAGNOSIS — Q21.3 TETRALOGY OF FALLOT: ICD-10-CM

## 2024-11-13 DIAGNOSIS — Z95.4 S/P TRANSCATHETER REPLACEMENT OF PULMONARY VALVE: Primary | ICD-10-CM

## 2024-11-13 NOTE — TELEPHONE ENCOUNTER
Attempted to reach out to patient to schedule follow up with Dr. Wilkins and an Echo prior. No answer, voicemail left.   Second attempt calling, letter sent via [place].

## 2024-11-14 NOTE — TELEPHONE ENCOUNTER
Date: 11/14/2024    Time of Call: 12:53 PM     Diagnosis:  s/p TPVR     [ TORB ] Ordering provider: Neil Wilkins MD    Order: groin ultrasound of both veins and arteries     Order received by: Sadie Hurd RN       Follow-up/additional notes: updated pt and sent to scheduling

## 2024-11-20 ENCOUNTER — ANCILLARY PROCEDURE (OUTPATIENT)
Dept: ULTRASOUND IMAGING | Facility: CLINIC | Age: 34
End: 2024-11-20
Payer: COMMERCIAL

## 2024-11-20 DIAGNOSIS — Q21.3 TETRALOGY OF FALLOT: ICD-10-CM

## 2024-11-20 DIAGNOSIS — R10.31 RIGHT GROIN PAIN: ICD-10-CM

## 2024-11-20 DIAGNOSIS — Z95.4 S/P TRANSCATHETER REPLACEMENT OF PULMONARY VALVE: ICD-10-CM

## 2024-11-20 PROCEDURE — 93971 EXTREMITY STUDY: CPT | Mod: GC | Performed by: RADIOLOGY

## 2024-11-20 PROCEDURE — 93926 LOWER EXTREMITY STUDY: CPT | Mod: GC | Performed by: RADIOLOGY

## 2024-11-21 NOTE — RESULT ENCOUNTER NOTE
ultrasounds were normal with no evidence of AV fistula or aneurysm. Please let us know if still having pain, redness, swelling or discharge.

## 2024-12-16 ENCOUNTER — MYC MEDICAL ADVICE (OUTPATIENT)
Dept: CARDIOLOGY | Facility: CLINIC | Age: 34
End: 2024-12-16
Payer: COMMERCIAL

## 2025-01-02 ENCOUNTER — MYC MEDICAL ADVICE (OUTPATIENT)
Dept: CARDIOLOGY | Facility: CLINIC | Age: 35
End: 2025-01-02
Payer: COMMERCIAL

## 2025-04-09 ENCOUNTER — HOSPITAL ENCOUNTER (OUTPATIENT)
Dept: CARDIAC REHAB | Facility: CLINIC | Age: 35
Discharge: HOME | End: 2025-04-09
Attending: STUDENT IN AN ORGANIZED HEALTH CARE EDUCATION/TRAINING PROGRAM
Payer: COMMERCIAL

## 2025-04-09 DIAGNOSIS — Q24.9 ADULT CONGENITAL HEART DISEASE: ICD-10-CM

## 2025-04-09 DIAGNOSIS — Q21.3 TETRALOGY OF FALLOT: ICD-10-CM

## 2025-04-09 DIAGNOSIS — Z95.4 S/P TRANSCATHETER REPLACEMENT OF PULMONARY VALVE: ICD-10-CM

## 2025-04-09 PROCEDURE — 93798 PHYS/QHP OP CAR RHAB W/ECG: CPT

## 2025-04-25 ENCOUNTER — ANCILLARY PROCEDURE (OUTPATIENT)
Dept: CARDIOLOGY | Facility: CLINIC | Age: 35
End: 2025-04-25
Attending: STUDENT IN AN ORGANIZED HEALTH CARE EDUCATION/TRAINING PROGRAM
Payer: COMMERCIAL

## 2025-04-25 VITALS
BODY MASS INDEX: 34.04 KG/M2 | DIASTOLIC BLOOD PRESSURE: 106 MMHG | OXYGEN SATURATION: 100 % | WEIGHT: 206.8 LBS | HEART RATE: 63 BPM | SYSTOLIC BLOOD PRESSURE: 163 MMHG

## 2025-04-25 DIAGNOSIS — Q24.9 ADULT CONGENITAL HEART DISEASE: ICD-10-CM

## 2025-04-25 DIAGNOSIS — Z29.89 INDICATION PRESENT FOR ENDOCARDITIS PROPHYLAXIS: ICD-10-CM

## 2025-04-25 DIAGNOSIS — Q21.3 TETRALOGY OF FALLOT: ICD-10-CM

## 2025-04-25 DIAGNOSIS — Z95.4 S/P TRANSCATHETER REPLACEMENT OF PULMONARY VALVE: ICD-10-CM

## 2025-04-25 DIAGNOSIS — Z29.8 * * * SBE PROPHYLAXIS * * *: ICD-10-CM

## 2025-04-25 DIAGNOSIS — I15.9 SECONDARY HYPERTENSION: ICD-10-CM

## 2025-04-25 PROCEDURE — 93320 DOPPLER ECHO COMPLETE: CPT | Performed by: STUDENT IN AN ORGANIZED HEALTH CARE EDUCATION/TRAINING PROGRAM

## 2025-04-25 PROCEDURE — 93325 DOPPLER ECHO COLOR FLOW MAPG: CPT | Performed by: STUDENT IN AN ORGANIZED HEALTH CARE EDUCATION/TRAINING PROGRAM

## 2025-04-25 PROCEDURE — 93005 ELECTROCARDIOGRAM TRACING: CPT

## 2025-04-25 PROCEDURE — 93303 ECHO TRANSTHORACIC: CPT | Performed by: STUDENT IN AN ORGANIZED HEALTH CARE EDUCATION/TRAINING PROGRAM

## 2025-04-25 PROCEDURE — 1126F AMNT PAIN NOTED NONE PRSNT: CPT

## 2025-04-25 PROCEDURE — G0463 HOSPITAL OUTPT CLINIC VISIT: HCPCS

## 2025-04-25 PROCEDURE — 3080F DIAST BP >= 90 MM HG: CPT

## 2025-04-25 PROCEDURE — 3077F SYST BP >= 140 MM HG: CPT

## 2025-04-25 PROCEDURE — 99214 OFFICE O/P EST MOD 30 MIN: CPT | Mod: 25

## 2025-04-25 RX ORDER — LOSARTAN POTASSIUM 50 MG/1
50 TABLET ORAL DAILY
Qty: 90 TABLET | Refills: 3 | Status: SHIPPED | OUTPATIENT
Start: 2025-04-25

## 2025-04-25 RX ORDER — CARVEDILOL 12.5 MG/1
12.5 TABLET ORAL 2 TIMES DAILY WITH MEALS
Qty: 180 TABLET | Refills: 3 | Status: SHIPPED | OUTPATIENT
Start: 2025-04-25

## 2025-04-25 RX ORDER — AMOXICILLIN 500 MG/1
CAPSULE ORAL
Qty: 12 CAPSULE | Refills: 3 | Status: SHIPPED | OUTPATIENT
Start: 2025-04-25

## 2025-04-25 ASSESSMENT — PAIN SCALES - GENERAL: PAINLEVEL_OUTOF10: NO PAIN (0)

## 2025-04-25 NOTE — PATIENT INSTRUCTIONS
"Thank you for visiting the Adult Congenital and Cardiovascular Genetics Clinic at the [facility].    Cardiology Providers you saw during your visit:  Neil Wilkins MD    Diagnosis:  Tetrology of Fallot    Results:  Neil Wilkins MD reviewed the results of your EKG and echocardiogram testing today in clinic.    If you have questions or concerns, please call us at [phone] or contact us through [facility].  ______________________________________________________________________________    Recommendations from your Cardiology Provider TODAY:    STOP spironolactone  DECREASE losartan to 50 mg daily  START carvedilol 12.5 mg twice a day  Check your blood pressures at home for the next month and send in readings. Your blood pressure goal is less than 130/80      ____________________________________________________________________________________________________    Follow-up Plan:  Follow up with Dr Wilkins in 6 months with an echocardiogram prior    ____________________________________________________________________________________________________    If you have questions or concerns, please call us at [phone] or contact us through [facility]. Our fax number is [phone]    Sadie Hurd RN RN, BSN   Yareli Hough (Scheduling)  Nurse Care Coordinator     Clinic   Adult Congenital and CV Genetics              Adult Congenital and CV Genetics  [facility]              [facility]        For after hours urgent needs, call [phone] and ask to speak to the \"On-Call Cardiologist.\"    For emergencies call [phone].      ____________________________________________________________________________________________________    Additional Important Information for Your Heart Health      General Cardiac Recommendations:  Continue to eat a heart healthy, low salt diet.  Continue to get 20-30 minutes of aerobic activity, 4-5 days per week.  Examples of " aerobic activity include walking, running, swimming, cycling, etc.  Continue to observe good oral hygiene, with regular dental visits.        SBE prophylaxis (antibiotics needed before dental appointments):   Yes__X__  No____    SBE prophylaxis applies to patients with certain heart conditions who are recommended to take antibiotics before dental appointments and other specific procedures. These antibiotics are to help prevent an infection of the heart (endocarditis) that certain patients are at higher risk of developing. The guidelines used come from the American Heart Association and are periodically updated.    If YES is checked, follow the recommendations outlined below:  Take antibiotic(s) prior to recommended dental procedures and procedures involving the respiratory tract or procedures involving infections of the skin, muscle or bones.   SBE prophylaxis is not needed for routine gastrointestinal and genitourinary procedures (ie. Colonoscopy or vaginal delivery)  Observe good oral hygiene daily, as advised by your dentist. Get regular professional dental care.  Keep cuts and other open injuries clean.  All infections should be treated as soon as possible.  Symptoms of Infective Endocarditis could include: fever lasting more than 4-5 days or a recurrent fever that initially resolves but returns within 1-2 days). Call us a [phone] if you are experiencing these symptoms.        FASTING CHOLESTEROL was checked in the last 5 years YES__X__  NO____ (2023)  If no, please follow up with your primary care physician. You should have a cholesterol screening every 5 years at minimum, and every year if taking a medication for your cholesterol levels.

## 2025-04-25 NOTE — Clinical Note
4/25/2025      RE: Brittani Gaviria  [address]  [facility] [address]       Dear Colleague,    Thank you for the opportunity to participate in the care of your patient, Brittani Gaviria, at the [facility] [place] [facility]. Please see a copy of my visit note below.    No notes on file    Please do not hesitate to contact me if you have any questions/concerns.     Sincerely,     Neil Wilkins MD

## 2025-04-25 NOTE — NURSING NOTE
Cardiac Testing: Patient given instructions regarding  echocardiogram . Discussed purpose, preparation, procedure and when to expect results reported back to the patient. Patient demonstrated understanding of this information and agreed to call with further questions or concerns.    Med Reconcile: Reviewed and verified all current medications with the patient. The updated medication list was printed and given to the patient.    New Medication: Patient was educated regarding newly prescribed medication, including discussion of  the indication, administration, side effects, and when to report to MD or RN. Patient demonstrated understanding of this information and agreed to call with further questions or concerns.    Return Appointment: Patient given instructions regarding scheduling next clinic visit. Patient demonstrated understanding of this information and agreed to call with further questions or concerns.    Medication Change: Patient was educated regarding prescribed medication change, including discussion of the indication, administration, side effects, and when to report to MD or RN. Patient demonstrated understanding of this information and agreed to call with further questions or concerns.    Patient stated she understood all health information given and agreed to call with further questions or concerns.

## 2025-04-25 NOTE — NURSING NOTE
Chief Complaint   Patient presents with    Follow Up     Return Congenital Heart-  34 yr old female with PMH significant for TOF and mechanical valve s/p TPVR 9/2023 presenting for evaluation.     Vitals were taken, medications reconciled, and EKG was performed.    GEORGINA Raman  9:24 AM

## 2025-04-28 LAB
ATRIAL RATE - MUSE: 72 BPM
DIASTOLIC BLOOD PRESSURE - MUSE: NORMAL MMHG
INTERPRETATION ECG - MUSE: NORMAL
P AXIS - MUSE: -1 DEGREES
PR INTERVAL - MUSE: 128 MS
QRS DURATION - MUSE: 132 MS
QT - MUSE: 418 MS
QTC - MUSE: 457 MS
R AXIS - MUSE: 87 DEGREES
SYSTOLIC BLOOD PRESSURE - MUSE: NORMAL MMHG
T AXIS - MUSE: 43 DEGREES
VENTRICULAR RATE- MUSE: 72 BPM

## 2025-05-05 ENCOUNTER — TELEPHONE (OUTPATIENT)
Dept: PEDIATRIC CARDIOLOGY | Facility: CLINIC | Age: 35
End: 2025-05-05
Payer: COMMERCIAL

## 2025-05-06 ENCOUNTER — HOSPITAL ENCOUNTER (OUTPATIENT)
Dept: CARDIAC REHAB | Facility: CLINIC | Age: 35
Discharge: HOME | End: 2025-05-06
Attending: STUDENT IN AN ORGANIZED HEALTH CARE EDUCATION/TRAINING PROGRAM
Payer: COMMERCIAL

## 2025-05-06 PROCEDURE — 93798 PHYS/QHP OP CAR RHAB W/ECG: CPT

## 2025-05-12 NOTE — PROGRESS NOTES
"Adult Congenital Heart Disease Clinic  2025    HPI:   Ms. Brittani Gaviria is a 34yr old female with a history of Tetralogy of Fallot (s/p surgical repair with transannular patch at 11 months of age, s/p pulmonary valve repair at 7yrs of age, s/p pulmonary valve replacement at 13yrs of age), moderate-severe pulmonary insufficiency, HTN, hyperparathyroidism, and palpitations who underwent transcatheter pulmonary valve replacement with a 26 mm Myriam valve on 2024. She was seen by Dr. Zambrano in early 2024 and [facility]. She has been bothered by palpitations in the past and was on diltiazem,  which has been discontinued. SHe is currently on Losartan 50 mg bid with persistent elevated BP.     Overall feeling fatigued - very busy with work and family.  BP poorly controlled on current medications. SOme palpitations but no sustained tachycardia. No chest pain, fainting, LE swelling. Some shortness of breath and dizziness with standing prolonged periods at work. Gets very fatigued with busy days      Cardiac history:  She notes that she was delivered via  in [place] at term.  She was diagnosed with Tetralogy of Fallot shortly after birth, but \"had to wait to grow\" before intervention, and ultimately underwent complete surgical repair at 11 months of age.  She then underwent pulmonary valve repair at 7yrs of age and pulmonary valve replacement at 13yrs of age (her first two procedures were performed at [facility], which is now closed, and the last at [facility] in [place], [place]).  She has never been anticoagulated.  She does not recall any significant cardiac limitations growing up.  She followed with the care team at [facility] until she was 17, then with [facility] in [place] (age 18-21) and [place], [place].  She has had 4 pregnancies -- first pregnancy at age 20 was uncomplicated, second at age 21 was " uncomplicated, third pregnancy at age 24 resulted in miscarriage at 13 weeks, and fourth pregnancy delivered via  due to prior  at [facility].      Since her last visit, she states that she feels ok overall.  She has been exercising, and is up to doing 30 mins at home, 3x/week.  She does not note SOB when exercising, but notes that she is more SOB with daily activities now than when compared to 1 month ago.  She notes a prior h/o anemia, for which she has received IV iron, and notes that she is typically SOB when anemic.  She is to have iron studies drawn per her PCP, but has not yet done so.  She is able to lie flat without PND and orthopnea, but does awaken at night intermittently with a cough.  She continues to note palpitations, but states that she saw EP last week where she was given additional prn diltiazem.  In the last week, she has taken one prn dose per day, and notes that her palpitations have improved.  She has been monitoring her diet, and has completely eliminated caffeine.  She is eating regularly, without vomiting, diarrhea, and constipation.  She gets nauseated when she has migraines, and notes that her migraines had been daily and are now down to 2x/week.  She has been trying to lose weight, noting that she was 220# and is now down to 194#.  She notes intermittent LE edema, which self-resolves.  Her BPs are 130-140/90s, when checked.  She had stopped her losartan, but has resumed and has been taking regularly for the last month.  She has ongoing chest pain, and wonders if this could be related to GERD.  She gets dizzy, and has been found to have vestibular issues for which she is doing PT and has noted some improvement.  She otherwise denies falls, headaches, acute vision changes, fevers, chills, sore throat, and signs of bleeding.      PAST MEDICAL HISTORY:  Past Medical History:   Diagnosis Date    Carpal tunnel syndrome     right, conservative management     Chlamydia      Cholelithiasis 2014    resolved    Hyperparathyroidism 7/21/2016    Iron deficiency anemia 2015    Kidney stones     Migraines     Other and unspecified ovarian cyst     Rh sensitization 7/21/2016    Tetralogy of Fallot     surgical correction       FAMILY HISTORY:  Family History   Problem Relation Age of Onset    Diabetes Maternal Grandmother     Hypertension Father     Hypertension Sister     Multiple Sclerosis Mother     Cerebrovascular Disease Mother     Coronary Artery Disease No family hx of     Depression No family hx of     Anxiety Disorder No family hx of     Anesthesia Reaction No family hx of     Cancer No family hx of     Thyroid Disease No family hx of     Glaucoma No family hx of     Macular Degeneration No family hx of        SOCIAL HISTORY:  Social History     Socioeconomic History    Marital status:      Spouse name: Pascual    Number of children: 2   Occupational History    Occupation: pharmacy tech     Comment: [employer]   Tobacco Use    Smoking status: Never     Passive exposure: Never    Smokeless tobacco: Never   Substance and Sexual Activity    Alcohol use: No     Alcohol/week: 0.0 standard drinks of alcohol    Drug use: No    Sexual activity: Yes     Partners: Male     Birth control/protection: None   Social History Narrative    From [place] and [place].          EXAM:  BP (!) 163/106 (BP Location: Left arm, Patient Position: Chair, Cuff Size: Adult Regular)   Pulse 63   Wt 93.8 kg (206 lb 12.8 oz)   SpO2 100%   BMI 34.04 kg/m    General: appears comfortable, alert and articulate  Head: normocephalic, atraumatic  Eyes: anicteric sclera, EOMI  Neck: no adenopathy  Orophyarynx: moist mucosa, no lesions, dentition intact  Heart: regular with occasional ectopy, +click, +systolic murmur best heard at LUSB, JVD negative when sitting upright No DM.   Lungs: clear, no rales or wheezing  Abdomen: soft, non-tender, bowel sounds present, no hepatosplenomegaly  Extremities: no clubbing, cyanosis or  LE edema  Neurological: normal speech and affect, no gross motor deficits  Integument: no open lesions, rashes, or jaundice    A 12 lead EKG was performed today and revealed NSR at a rate of 72 bpm. There is RBBB with QRS duration 130 msec sinus arrhythmia with rare PAC's. No change from prior ECG's     o 9/24  - Patient had a min HR of 58 bpm, max HR of 211 bpm, and avg HR of 81 bpm. Predominant underlying rhythm was Sinus Rhythm. Bundle Branch Block/IVCD was present.   - 14 Supraventricular Tachycardia runs occurred, the run with the fastest interval lasting 8 beats with a max rate of 211 bpm, the longest lasting 13 beats  - Isolated SVEs were occasional (2.4%, 22200), SVE Couplets were rare (<1.0%, 172), and SVE Triplets were rare (<1.0%, 18).   - Isolated VEs were rare (<1.0%, 1331), VE Couplets were rare (<1.0%, 1), and VE Triplets were rare (<1.0%, 1).    Echo today:   Tetralogy of Fallot s/p surgical repair with transannular patch at 11 months  of age, s/p pulmonary valve repair at 7yrs of age, s/p pulmonary valve  replacement at 13yrs of age. S/p TCPVR with 26mm Myriam 3 valve (9/23/24).     There is mild obstruction across the pulmonary valve with a peak gradient 19  mmHg. Mild paravalvar leak. Branch PA's not well seen. With limited 2D and  color doppler there is no obvious obstruction.Trivial tricuspid valve  insufficiency. Insufficient jet to estimate right ventricular systolic  pressure. The right ventricle is mildly dilated with normal systolic function.  Normal left ventricular size and systolic function. No pericardial effusion.         Assessment and Plan:    Brittani Gaviria is a 34yr old female with a history of Tetralogy of Fallot (s/p surgical repair with transannular patch at 11 months of age, s/p pulmonary valve repair at 7yrs of age, s/p pulmonary valve replacement at 13yrs of age)  Underwent TPVR Sept 2024. ALso has poorly controlled HTN, exercise intolerance hyperparathyroidism, and  palpitations. SHe is here for a 6th month post TPVR evaluation.     Valve function is good by echo with a small perivalvar leak. SHe remains hypertensive on current medications.     Recommendations:   STOP spironolactone  DECREASE losartan to 50 mg daily  START carvedilol 12.5 mg twice a day  Check your blood pressures at home for the next month and send in readings. Your blood pressure goal is less than 130/80  May resume regular dental cleanings with SBE prophylaxis  COntinue to get regular exercise as able.   We did provide a letter for intermittent breaks during the day due to fatigue and shortness of breath  DIscuss timing of MRI,  CPX , Holter at next visit.     Follow-up Plan:  Follow up with Dr Wilkins in 6 months with an echocardiogram prior    Neil Wilkins M.D.   of Pediatrics  Pediatric and Adult Congenital Cardiology  [facility]  Pediatric Cardiology Office [phone]  Adult Congenital Cardiology Triage and Scheduling [phone]     A total of 30 minutes were spent in personal review of testing, including echo and ECG, review of documented history and interval events in chart, face to face visit with discussion of findings and plan, and care coordination.

## 2025-05-13 ENCOUNTER — MYC MEDICAL ADVICE (OUTPATIENT)
Dept: CARDIOLOGY | Facility: CLINIC | Age: 35
End: 2025-05-13
Payer: COMMERCIAL

## 2025-06-04 ENCOUNTER — MYC MEDICAL ADVICE (OUTPATIENT)
Dept: CARDIOLOGY | Facility: CLINIC | Age: 35
End: 2025-06-04
Payer: COMMERCIAL

## 2025-07-19 ENCOUNTER — HEALTH MAINTENANCE LETTER (OUTPATIENT)
Age: 35
End: 2025-07-19

## (undated) DEVICE — SYR ANGIOGRAPHY MULTIUSE KIT ACIST 014612

## (undated) DEVICE — SHEATH INTRODUCER PRELUDE IDEAL 4FR X 11CM  HYDROPHILIC  ANG

## (undated) DEVICE — DILATOR AMPLATZ RENAL SET 06-30FRX16CM  G57459

## (undated) DEVICE — GLIDEWIRE TERUMO RADIOFOCUS .035X260CM ANG EXCHANGE GR3509

## (undated) DEVICE — PACK HEART LEFT CUSTOM

## (undated) DEVICE — INTRO SHEATH 8FRX10CM PINNACLE RSS802

## (undated) DEVICE — MANIFOLD CUSTOM 2 VALVE 602101714

## (undated) DEVICE — Device

## (undated) DEVICE — CATH ANGIO MLTRCK RA 6FR L100CM OFST DIST .035IN 615009

## (undated) DEVICE — KIT VENOUS FLUSH 60210177

## (undated) DEVICE — KIT HAND CONTROL ANGIOTOUCH ACIST 65CM AT-P65

## (undated) DEVICE — SYSTEM DELIVERY MECHANISM COMMANDER 26MM

## (undated) DEVICE — INTRODUCER SET MICROPUNCTURE SHEATH 4FRX10CM MPIS-401-NT-SST

## (undated) DEVICE — CATH ANGIO INFINITI AL2 4FRX100CM 538446

## (undated) DEVICE — CATH EP 7FR X 115CM DECANAV CA

## (undated) DEVICE — CATH ANGIO INFINITI AR MOD 4FRX100CM 538448

## (undated) DEVICE — INTRO CATH 12CM 8.5FR FST-CATH

## (undated) DEVICE — CATH ANGIO INFINITI JL4 4FRX100CM 538420

## (undated) DEVICE — CLOSURE DEVICE 6FR VASC PROGLIDE MEDICATED SUTURE 12673-03

## (undated) DEVICE — PATCH CARTO 3 EXTERNAL REFERENCE 3D MAPPING CREFP6

## (undated) DEVICE — MANIFOLD KIT ANGIO AUTOMATED 014613

## (undated) DEVICE — RAD INFLATOR BASIC COMPAK  IN4130

## (undated) DEVICE — PACK PEDS LEFT HEART CUSTOM SCV15OHRMH

## (undated) DEVICE — WIRE GUIDE 0.035"X260CM SAFE-T-J EXCHANGE G00517

## (undated) DEVICE — WIRE GUIDE 0.035"X150CM EMERALD J TIP 502521

## (undated) DEVICE — CATH PIGTAILS W/MARKERS 5FR 100CM 7602-20M100SH

## (undated) DEVICE — WIRE GUIDE LUNDERQUIST 0.035"X260CM DBL CVD

## (undated) DEVICE — KIT MICROINTRODUCER VASCULAR VSI 4FRX0.018INX40CM 7195X

## (undated) DEVICE — CATH ANGIO INFINITI JR4 4FRX100CM 538421

## (undated) DEVICE — CATH ANGIO WEDGE PRESSURE 7FRX110CM DL AI-07127

## (undated) DEVICE — ELECTRODE DEFIB CADENCE 22550R

## (undated) DEVICE — DEVICE INFLATION ATRION QL 40CC 15ATM

## (undated) RX ORDER — FENTANYL CITRATE 50 UG/ML
INJECTION, SOLUTION INTRAMUSCULAR; INTRAVENOUS
Status: DISPENSED
Start: 2024-09-23

## (undated) RX ORDER — IODIXANOL 320 MG/ML
INJECTION, SOLUTION INTRAVASCULAR
Status: DISPENSED
Start: 2024-09-23

## (undated) RX ORDER — CEFAZOLIN SODIUM 2 G/100ML
INJECTION, SOLUTION INTRAVENOUS
Status: DISPENSED
Start: 2024-07-01

## (undated) RX ORDER — HEPARIN SODIUM 1000 [USP'U]/ML
INJECTION, SOLUTION INTRAVENOUS; SUBCUTANEOUS
Status: DISPENSED
Start: 2024-07-01

## (undated) RX ORDER — FENTANYL CITRATE 50 UG/ML
INJECTION, SOLUTION INTRAMUSCULAR; INTRAVENOUS
Status: DISPENSED
Start: 2024-07-01

## (undated) RX ORDER — HEPARIN SODIUM 1000 [USP'U]/ML
INJECTION, SOLUTION INTRAVENOUS; SUBCUTANEOUS
Status: DISPENSED
Start: 2024-09-23

## (undated) RX ORDER — ONDANSETRON 2 MG/ML
INJECTION INTRAMUSCULAR; INTRAVENOUS
Status: DISPENSED
Start: 2024-07-01

## (undated) RX ORDER — SODIUM CHLORIDE 9 MG/ML
INJECTION, SOLUTION INTRAVENOUS
Status: DISPENSED
Start: 2024-07-01

## (undated) RX ORDER — DIPHENHYDRAMINE HYDROCHLORIDE 50 MG/ML
INJECTION INTRAMUSCULAR; INTRAVENOUS
Status: DISPENSED
Start: 2024-07-01

## (undated) RX ORDER — LIDOCAINE HYDROCHLORIDE 10 MG/ML
INJECTION, SOLUTION EPIDURAL; INFILTRATION; INTRACAUDAL; PERINEURAL
Status: DISPENSED
Start: 2024-09-23

## (undated) RX ORDER — CEFAZOLIN SODIUM 1 G/3ML
INJECTION, POWDER, FOR SOLUTION INTRAMUSCULAR; INTRAVENOUS
Status: DISPENSED
Start: 2024-09-23

## (undated) RX ORDER — FENTANYL CITRATE-0.9 % NACL/PF 10 MCG/ML
PLASTIC BAG, INJECTION (ML) INTRAVENOUS
Status: DISPENSED
Start: 2024-07-01

## (undated) RX ORDER — MIDAZOLAM HCL IN 0.9 % NACL/PF 1 MG/ML
PLASTIC BAG, INJECTION (ML) INTRAVENOUS
Status: DISPENSED
Start: 2024-07-01

## (undated) RX ORDER — BUPIVACAINE HYDROCHLORIDE 2.5 MG/ML
INJECTION, SOLUTION EPIDURAL; INFILTRATION; INTRACAUDAL
Status: DISPENSED
Start: 2024-09-23

## (undated) RX ORDER — CEFAZOLIN SODIUM/WATER 2 G/20 ML
SYRINGE (ML) INTRAVENOUS
Status: DISPENSED
Start: 2024-09-23